# Patient Record
Sex: MALE | Race: WHITE | NOT HISPANIC OR LATINO | Employment: OTHER | URBAN - NONMETROPOLITAN AREA
[De-identification: names, ages, dates, MRNs, and addresses within clinical notes are randomized per-mention and may not be internally consistent; named-entity substitution may affect disease eponyms.]

---

## 2017-12-04 ENCOUNTER — OFFICE VISIT (OUTPATIENT)
Dept: CARDIOLOGY | Facility: CLINIC | Age: 67
End: 2017-12-04

## 2017-12-04 VITALS
DIASTOLIC BLOOD PRESSURE: 91 MMHG | HEIGHT: 69 IN | SYSTOLIC BLOOD PRESSURE: 195 MMHG | OXYGEN SATURATION: 97 % | WEIGHT: 197.4 LBS | BODY MASS INDEX: 29.24 KG/M2 | HEART RATE: 91 BPM

## 2017-12-04 DIAGNOSIS — R07.9 CHEST PAIN, UNSPECIFIED TYPE: ICD-10-CM

## 2017-12-04 DIAGNOSIS — I10 ESSENTIAL HYPERTENSION: ICD-10-CM

## 2017-12-04 DIAGNOSIS — R09.89 CAROTID BRUIT, UNSPECIFIED LATERALITY: ICD-10-CM

## 2017-12-04 DIAGNOSIS — R06.02 SHORTNESS OF BREATH: Primary | ICD-10-CM

## 2017-12-04 PROCEDURE — 99204 OFFICE O/P NEW MOD 45 MIN: CPT | Performed by: PHYSICIAN ASSISTANT

## 2017-12-04 RX ORDER — NITROGLYCERIN 0.4 MG/1
TABLET SUBLINGUAL
Qty: 100 TABLET | Refills: 11 | Status: SHIPPED | OUTPATIENT
Start: 2017-12-04 | End: 2020-12-03 | Stop reason: SDUPTHER

## 2017-12-04 RX ORDER — LISINOPRIL 20 MG/1
20 TABLET ORAL DAILY
Qty: 30 TABLET | Refills: 11 | Status: SHIPPED | OUTPATIENT
Start: 2017-12-04 | End: 2018-10-29

## 2017-12-04 RX ORDER — LEVOTHYROXINE SODIUM 0.05 MG/1
50 TABLET ORAL DAILY
Refills: 0 | COMMUNITY
Start: 2017-11-29

## 2017-12-04 RX ORDER — TERAZOSIN 5 MG/1
5 CAPSULE ORAL NIGHTLY
Refills: 0 | COMMUNITY
Start: 2017-11-29 | End: 2020-02-13

## 2017-12-04 RX ORDER — ESCITALOPRAM OXALATE 10 MG/1
10 TABLET ORAL DAILY
Refills: 0 | COMMUNITY
Start: 2017-11-29 | End: 2019-08-13

## 2017-12-04 RX ORDER — SIMVASTATIN 40 MG
TABLET ORAL DAILY
Refills: 0 | COMMUNITY
Start: 2017-11-29 | End: 2021-09-09

## 2017-12-04 NOTE — PROGRESS NOTES
"Problem list     Subjective   Ye Butler is a 67 y.o. male     Chief Complaint   Patient presents with   • Chest Pain     rare, feelslike pinched nerves    • Palpitations     patient denies palpiations but was dx with extrasystoles    • Shortness of Breath     with over exertion        HPI    Patient is a 67-year-old male that presents for initial evaluation.  Patient recently seen primary care provider performed an EKG.  EKG suggested an accelerated idioventricular rhythm with right bundle-branch block and right axis deviation.  Patient was sent to our office for further evaluation.    Patient has no history of coronary artery disease.  Patient describes on occasion he will get discomfort substernally that he describes as a \"pinching\".  He describes rubbing it and and eventually resolves.  Patient has moderate levels of shortness of breath with exertion or doing activity.  He has a denies PND orthopnea.  He doesn't palpitate.  He occasionally has orthostatic lightheadedness.  No dizziness presyncope or syncope and otherwise is doing well      Outpatient Encounter Prescriptions as of 12/4/2017   Medication Sig Dispense Refill   • escitalopram (LEXAPRO) 10 MG tablet Daily.  0   • levothyroxine (SYNTHROID, LEVOTHROID) 50 MCG tablet Daily.  0   • metFORMIN (GLUCOPHAGE) 500 MG tablet Daily.  0   • metoprolol tartrate (LOPRESSOR) 25 MG tablet 2 (Two) Times a Day.  0   • simvastatin (ZOCOR) 10 MG tablet Daily.  0   • terazosin (HYTRIN) 5 MG capsule Daily.  0   • aspirin 81 MG tablet Take 1 tablet by mouth Daily. 30 tablet 11   • lisinopril (PRINIVIL,ZESTRIL) 20 MG tablet Take 1 tablet by mouth Daily. 30 tablet 11   • nitroglycerin (NITROSTAT) 0.4 MG SL tablet 1 under the tongue as needed for angina, may repeat q5mins for up three doses 100 tablet 11     No facility-administered encounter medications on file as of 12/4/2017.        Codeine    Past Medical History:   Diagnosis Date   • COPD (chronic obstructive " pulmonary disease)    • Diabetes mellitus    • Hyperlipidemia    • Hypertension    • Kidney stone    • Stroke        Social History     Social History   • Marital status:      Spouse name: N/A   • Number of children: N/A   • Years of education: N/A     Occupational History   • Not on file.     Social History Main Topics   • Smoking status: Former Smoker   • Smokeless tobacco: Never Used      Comment: quite 20+ yrs ago    • Alcohol use No      Comment: former use 20 + yrs ago    • Drug use: No   • Sexual activity: Defer     Other Topics Concern   • Not on file     Social History Narrative   • No narrative on file       Family History   Problem Relation Age of Onset   • Cancer Mother    • Heart disease Father    • Stroke Father        Review of Systems   Constitutional: Positive for diaphoresis (nocturnal night sweats) and fatigue.   HENT: Negative.    Eyes: Negative.  Negative for visual disturbance.   Respiratory: Positive for chest tightness and shortness of breath. Negative for cough, choking, wheezing and stridor.    Cardiovascular: Positive for chest pain (rare, described as pinches nerves sensation ) and palpitations (pt denies sensation of palp or fluttering but states he was dx with extrasystoles ). Negative for leg swelling.   Gastrointestinal: Negative.  Negative for abdominal pain, blood in stool (no melena, no hematuria, no hematemesis, no hematochezia), constipation, diarrhea, nausea and vomiting.   Endocrine: Negative.  Negative for cold intolerance and heat intolerance.   Genitourinary: Negative.  Negative for difficulty urinating and frequency.   Musculoskeletal: Positive for back pain and myalgias (BL, nocturnal leg cramps).   Skin: Positive for wound (s/p pain pump implantantion LLQ ). Negative for color change, pallor and rash.   Allergic/Immunologic: Negative.    Neurological: Positive for light-headedness and numbness (BL feet ). Negative for dizziness, tremors, seizures, syncope, facial  "asymmetry, speech difficulty, weakness and headaches.   Psychiatric/Behavioral: Positive for confusion (pt states he was hunting a got lost, he states he walks those woods often, esily confused recently ).       Objective   Vitals:    12/04/17 1416   BP: (!) 195/91   BP Location: Left arm   Patient Position: Sitting   Pulse: 91   SpO2: 97%   Weight: 197 lb 6.4 oz (89.5 kg)   Height: 69\" (175.3 cm)      BP (!) 195/91 (BP Location: Left arm, Patient Position: Sitting)  Pulse 91  Ht 69\" (175.3 cm)  Wt 197 lb 6.4 oz (89.5 kg)  SpO2 97%  BMI 29.15 kg/m2    Lab Results (most recent)     None          Physical Exam   Constitutional: He is oriented to person, place, and time. He appears well-developed and well-nourished. No distress.   HENT:   Head: Normocephalic and atraumatic.   Eyes: EOM are normal. Pupils are equal, round, and reactive to light.   Neck: No JVD present. Carotid bruit is present.   Cardiovascular: Normal rate, regular rhythm, normal heart sounds and intact distal pulses.  Exam reveals no gallop and no friction rub.    No murmur heard.  Pulmonary/Chest: Effort normal and breath sounds normal. No respiratory distress. He has no wheezes. He has no rales. He exhibits no tenderness.   Abdominal: Soft.   Musculoskeletal: Normal range of motion. He exhibits no edema.   Neurological: He is alert and oriented to person, place, and time. No cranial nerve deficit.   Skin: Skin is warm and dry. No rash noted. No erythema. No pallor.   Psychiatric: He has a normal mood and affect. His behavior is normal.   Nursing note and vitals reviewed.      Procedure   Procedures       Assessment/Plan     Problems Addressed this Visit        Cardiovascular and Mediastinum    Essential hypertension    Relevant Medications    metoprolol tartrate (LOPRESSOR) 25 MG tablet    terazosin (HYTRIN) 5 MG capsule    lisinopril (PRINIVIL,ZESTRIL) 20 MG tablet    Other Relevant Orders    US Carotid Bilateral    Carotid bruit    Relevant " Orders    US Carotid Bilateral       Respiratory    Shortness of breath - Primary    Relevant Orders    US Carotid Bilateral       Nervous and Auditory    Chest pain    Relevant Orders    US Carotid Bilateral            Recommendation  1.  Because of patient's symptoms and abnormal EKG, we'll schedule for an ischemia assessment because of chest pain.  We will schedule for Lexiscan stress test.  Echocardiogram to evaluate LV function, assess filling pressures, diastolic function etc.  Next line 2.  I'm starting him on aspirin therapy to take with his current medical regimen.  Because of diabetes mellitus.  Elevated blood pressure, we'll like to start on lisinopril as well.  2.  We will see him back for follow-up of above testing.  Follow-up primary as scheduled         Electronically signed by:

## 2017-12-27 ENCOUNTER — HOSPITAL ENCOUNTER (OUTPATIENT)
Dept: CARDIOLOGY | Facility: HOSPITAL | Age: 67
Discharge: HOME | End: 2017-12-27

## 2017-12-27 ENCOUNTER — OUTSIDE FACILITY SERVICE (OUTPATIENT)
Dept: CARDIOLOGY | Facility: CLINIC | Age: 67
End: 2017-12-27

## 2017-12-27 LAB
MAXIMAL PREDICTED HEART RATE: 155 BPM
MAXIMAL PREDICTED HEART RATE: 155 BPM
STRESS TARGET HR: 132 BPM
STRESS TARGET HR: 132 BPM

## 2017-12-27 PROCEDURE — 93880 EXTRACRANIAL BILAT STUDY: CPT

## 2017-12-27 PROCEDURE — 0 TECHNETIUM SESTAMIBI: Performed by: INTERNAL MEDICINE

## 2017-12-27 PROCEDURE — A9500 TC99M SESTAMIBI: HCPCS | Performed by: INTERNAL MEDICINE

## 2017-12-27 PROCEDURE — 78452 HT MUSCLE IMAGE SPECT MULT: CPT

## 2017-12-27 PROCEDURE — 25010000002 REGADENOSON 0.4 MG/5ML SOLUTION: Performed by: INTERNAL MEDICINE

## 2017-12-27 PROCEDURE — 93306 TTE W/DOPPLER COMPLETE: CPT

## 2017-12-27 PROCEDURE — 93017 CV STRESS TEST TRACING ONLY: CPT

## 2017-12-27 RX ADMIN — TECHNETIUM TC-99M SESTAMIBI 1 DOSE: 1 INJECTION INTRAVENOUS at 11:37

## 2017-12-27 RX ADMIN — REGADENOSON 0.4 MG: 0.08 INJECTION, SOLUTION INTRAVENOUS at 11:37

## 2017-12-28 PROCEDURE — 93018 CV STRESS TEST I&R ONLY: CPT | Performed by: INTERNAL MEDICINE

## 2017-12-28 PROCEDURE — 78452 HT MUSCLE IMAGE SPECT MULT: CPT | Performed by: INTERNAL MEDICINE

## 2018-01-03 PROCEDURE — 93880 EXTRACRANIAL BILAT STUDY: CPT | Performed by: INTERNAL MEDICINE

## 2018-01-08 PROCEDURE — 93306 TTE W/DOPPLER COMPLETE: CPT | Performed by: INTERNAL MEDICINE

## 2018-01-10 ENCOUNTER — DOCUMENTATION (OUTPATIENT)
Dept: CARDIOLOGY | Facility: CLINIC | Age: 68
End: 2018-01-10

## 2018-01-10 NOTE — PROGRESS NOTES
PATIENT's WIFE AWARE OF ECHO, STRESS, AND CAROTID U/S RESULTS .  PIEDAD TRIMBLE SCHEDULING FOLLOW UP APPT. MELVI,MATTHEW

## 2018-03-13 ENCOUNTER — OFFICE VISIT (OUTPATIENT)
Dept: CARDIOLOGY | Facility: CLINIC | Age: 68
End: 2018-03-13

## 2018-03-13 ENCOUNTER — LAB (OUTPATIENT)
Dept: LAB | Facility: HOSPITAL | Age: 68
End: 2018-03-13

## 2018-03-13 VITALS
OXYGEN SATURATION: 97 % | BODY MASS INDEX: 29.38 KG/M2 | HEIGHT: 69 IN | DIASTOLIC BLOOD PRESSURE: 79 MMHG | HEART RATE: 69 BPM | WEIGHT: 198.4 LBS | SYSTOLIC BLOOD PRESSURE: 163 MMHG

## 2018-03-13 DIAGNOSIS — R07.9 CHEST PAIN, UNSPECIFIED TYPE: ICD-10-CM

## 2018-03-13 DIAGNOSIS — I44.2 ACCELERATED IDIOVENTRICULAR RHYTHM (HCC): ICD-10-CM

## 2018-03-13 DIAGNOSIS — R00.2 PALPITATIONS: ICD-10-CM

## 2018-03-13 DIAGNOSIS — R06.02 SHORTNESS OF BREATH: Primary | ICD-10-CM

## 2018-03-13 DIAGNOSIS — R06.02 SHORTNESS OF BREATH: ICD-10-CM

## 2018-03-13 PROCEDURE — 80048 BASIC METABOLIC PNL TOTAL CA: CPT | Performed by: PHYSICIAN ASSISTANT

## 2018-03-13 PROCEDURE — 99214 OFFICE O/P EST MOD 30 MIN: CPT | Performed by: PHYSICIAN ASSISTANT

## 2018-03-13 RX ORDER — CLOPIDOGREL BISULFATE 75 MG/1
75 TABLET ORAL DAILY
Qty: 30 TABLET | Refills: 11 | Status: SHIPPED | OUTPATIENT
Start: 2018-03-13 | End: 2018-04-02 | Stop reason: SINTOL

## 2018-03-13 RX ORDER — DOXYCYCLINE HYCLATE 100 MG/1
CAPSULE ORAL 2 TIMES DAILY
COMMUNITY
Start: 2018-03-12 | End: 2018-04-09

## 2018-03-13 NOTE — PROGRESS NOTES
Problem list     Subjective   Ye Butler is a 68 y.o. male     Chief Complaint   Patient presents with   • Hypertension     Here for testing f/u   • Hyperlipidemia   • COPD   • Diabetes   • Cerebrovascular Accident       HPI    Problem list  1.  Chest pain  1.1 stress test December 2017 demonstrates no evidence of ischemia and preserved LV function  1.2 chest pain on antianginal therapy  2.  Accelerated Idioventricular rhythm  2.1 EKG at primary office demonstrated idioventricular rhythm at 90 bpm  3.  Hypertension  4..  Dyslipidemia  5.  Type 2 diabetes mellitus  6.  Family history of premature coronary artery disease and a brother with sudden cardiac death    Patient is a 68-year-old male that presents for follow-up on stress test and echocardiogram.  Patient had EKG at primary office demonstrating what appears to be a possible idioventricular rhythm.  On initial evaluation, alert stress test and echocardiogram to evaluate.  LV function is normal.  Stress test is unremarkable.  Patient was placed on beta blocker therapy which has improved his symptoms however he continues to have discomfort.  Furthermore, he has left arm pain that occurs despite being on medical therapy.  Patient describes having unexplained dizziness or palpitations have seemingly improved.  He has mild levels of shortness of breath when exerting or doing activity.  No PND orthopnea.  Otherwise is doing well      Outpatient Encounter Prescriptions as of 3/13/2018   Medication Sig Dispense Refill   • aspirin 81 MG tablet Take 1 tablet by mouth Daily. 30 tablet 11   • doxycycline (VIBRAMYCIN) 100 MG capsule 2 (Two) Times a Day.     • escitalopram (LEXAPRO) 10 MG tablet Daily.  0   • levothyroxine (SYNTHROID, LEVOTHROID) 50 MCG tablet Daily.  0   • lisinopril (PRINIVIL,ZESTRIL) 20 MG tablet Take 1 tablet by mouth Daily. 30 tablet 11   • metFORMIN (GLUCOPHAGE) 500 MG tablet Daily.  0   • metoprolol tartrate (LOPRESSOR) 25 MG tablet 2 (Two)  Times a Day.  0   • simvastatin (ZOCOR) 10 MG tablet Daily.  0   • terazosin (HYTRIN) 5 MG capsule Daily.  0   • clopidogrel (PLAVIX) 75 MG tablet Take 1 tablet by mouth Daily. 30 tablet 11   • nitroglycerin (NITROSTAT) 0.4 MG SL tablet 1 under the tongue as needed for angina, may repeat q5mins for up three doses 100 tablet 11     No facility-administered encounter medications on file as of 3/13/2018.        Codeine    Past Medical History:   Diagnosis Date   • COPD (chronic obstructive pulmonary disease)    • Diabetes mellitus    • Hyperlipidemia    • Hypertension    • Kidney stone    • Stroke        Social History     Social History   • Marital status:      Spouse name: N/A   • Number of children: N/A   • Years of education: N/A     Occupational History   • Not on file.     Social History Main Topics   • Smoking status: Former Smoker   • Smokeless tobacco: Never Used      Comment: quite 20+ yrs ago    • Alcohol use No      Comment: former use 20 + yrs ago    • Drug use: No   • Sexual activity: Defer     Other Topics Concern   • Not on file     Social History Narrative   • No narrative on file       Family History   Problem Relation Age of Onset   • Cancer Mother    • Heart disease Father    • Stroke Father        Review of Systems   Constitutional: Positive for fatigue.   HENT: Positive for hearing loss, postnasal drip and sneezing.         Throat tickle   Eyes: Positive for visual disturbance (reading glasses).   Respiratory: Positive for shortness of breath.    Cardiovascular: Positive for chest pain and palpitations.   Gastrointestinal: Negative.    Endocrine: Negative.    Genitourinary:        Indwelling urinary cath. Due to prostate   Musculoskeletal: Positive for arthralgias and myalgias.   Skin: Negative.    Allergic/Immunologic: Positive for environmental allergies.   Neurological: Positive for dizziness.   Hematological: Negative.    Psychiatric/Behavioral: Positive for sleep disturbance.  "      Objective   Vitals:    03/13/18 1403   BP: 163/79   BP Location: Left arm   Patient Position: Sitting   Pulse: 69   SpO2: 97%   Weight: 90 kg (198 lb 6.4 oz)   Height: 175.3 cm (69\")      /79 (BP Location: Left arm, Patient Position: Sitting)   Pulse 69   Ht 175.3 cm (69\")   Wt 90 kg (198 lb 6.4 oz)   SpO2 97%   BMI 29.30 kg/m²     Lab Results (most recent)     None          Physical Exam   Constitutional: He is oriented to person, place, and time. He appears well-developed and well-nourished. No distress.   HENT:   Head: Normocephalic and atraumatic.   Eyes: EOM are normal. Pupils are equal, round, and reactive to light.   Neck: No JVD present.   Cardiovascular: Normal rate, regular rhythm, normal heart sounds and intact distal pulses.  Exam reveals no gallop and no friction rub.    No murmur heard.  Pulmonary/Chest: Effort normal and breath sounds normal. No respiratory distress. He has no wheezes. He has no rales. He exhibits no tenderness.   Musculoskeletal: Normal range of motion. He exhibits no edema.   Neurological: He is alert and oriented to person, place, and time. No cranial nerve deficit.   Skin: Skin is warm and dry. No rash noted. No erythema. No pallor.   Psychiatric: He has a normal mood and affect. His behavior is normal.   Nursing note and vitals reviewed.      Procedure   Procedures       Assessment/Plan     Problems Addressed this Visit        Cardiovascular and Mediastinum    Accelerated idioventricular rhythm    Relevant Medications    clopidogrel (PLAVIX) 75 MG tablet    Palpitations    Relevant Orders    Cardiac catheterization    Cardiac Event Monitor       Respiratory    Shortness of breath - Primary    Relevant Orders    Cardiac catheterization    Cardiac Event Monitor       Nervous and Auditory    Chest pain    Relevant Orders    Cardiac catheterization    Cardiac Event Monitor      Other Visit Diagnoses    None.           Recommendation  1.  Patient with an accelerated " idioventricular rhythm on initial EKG.  Patient complains of chest pain and significant arm discomfort.  He continues to have symptoms on antianginal therapy.  Risk factors for coronary artery disease are significant including family history of premature coronary artery disease and a brother with sudden cardiac death.  Other risk factors include diabetes mellitus, hypertension, dyslipidemia.  Based on patient's symptoms and arrhythmia, I feel further evaluation is warranted by cardiac catheterization.  We will place on dual antiplatelet therapy and continue all other medications.  2.  I would like to have him wear an extended event monitor to rule out any other ventricular arrhythmias such as ventricular tachycardia.  3.  Otherwise, he has nitroglycerin for chest pain.  Any chest pain or supple nitroglycerin, he will go to the ER.  Follow-up primary as scheduled              Discussed the patient's BMI with him. BMI is within normal parameters. No follow-up required.       Electronically signed by:

## 2018-03-14 LAB
ANION GAP SERPL CALCULATED.3IONS-SCNC: 9.8 MMOL/L (ref 3.6–11.2)
BUN BLD-MCNC: 11 MG/DL (ref 7–21)
BUN/CREAT SERPL: 13.6 (ref 7–25)
CALCIUM SPEC-SCNC: 10 MG/DL (ref 7.7–10)
CHLORIDE SERPL-SCNC: 104 MMOL/L (ref 99–112)
CO2 SERPL-SCNC: 25.2 MMOL/L (ref 24.3–31.9)
CREAT BLD-MCNC: 0.81 MG/DL (ref 0.43–1.29)
GFR SERPL CREATININE-BSD FRML MDRD: 95 ML/MIN/1.73
GLUCOSE BLD-MCNC: 141 MG/DL (ref 70–110)
OSMOLALITY SERPL CALC.SUM OF ELEC: 279.3 MOSM/KG (ref 273–305)
POTASSIUM BLD-SCNC: 4.5 MMOL/L (ref 3.5–5.3)
SODIUM BLD-SCNC: 139 MMOL/L (ref 135–153)

## 2018-03-19 ENCOUNTER — TELEPHONE (OUTPATIENT)
Dept: CARDIOLOGY | Facility: CLINIC | Age: 68
End: 2018-03-19

## 2018-03-19 NOTE — TELEPHONE ENCOUNTER
PER AMY SCHWARZ, PAC OK TO STAY OFF PLAVIX, BUT MUST NE ON A ASA 81 MG DAILY. WIFE STATES HE TOOK PLAVIX FOR 2-3 DAYS, BUT STARTED THROWING UP. STATES HE ALREADY TAKES A 81 MG ASA EVERY MORNING. MATTHEW OGDEN          ----- Message from Anais Aragon LPN sent at 3/19/2018 10:57 AM EDT -----   Amy started patient on Plavix, however his wife states that it made him sick to his stomach so he stopped it.

## 2018-03-29 ENCOUNTER — OUTSIDE FACILITY SERVICE (OUTPATIENT)
Dept: CARDIOLOGY | Facility: CLINIC | Age: 68
End: 2018-03-29

## 2018-03-29 PROCEDURE — 93228 REMOTE 30 DAY ECG REV/REPORT: CPT | Performed by: INTERNAL MEDICINE

## 2018-04-02 ENCOUNTER — TELEPHONE (OUTPATIENT)
Dept: CARDIOLOGY | Facility: CLINIC | Age: 68
End: 2018-04-02

## 2018-04-02 ENCOUNTER — OUTSIDE FACILITY SERVICE (OUTPATIENT)
Dept: CARDIOLOGY | Facility: CLINIC | Age: 68
End: 2018-04-02

## 2018-04-02 PROCEDURE — 92929 PR PRQ TRLUML CORONARY STENT W/ANGIO ADDL ART/BRNCH: CPT | Performed by: INTERNAL MEDICINE

## 2018-04-02 PROCEDURE — 93458 L HRT ARTERY/VENTRICLE ANGIO: CPT | Performed by: INTERNAL MEDICINE

## 2018-04-02 PROCEDURE — 92928 PRQ TCAT PLMT NTRAC ST 1 LES: CPT | Performed by: INTERNAL MEDICINE

## 2018-04-02 NOTE — TELEPHONE ENCOUNTER
Patient just received Cath and cannot tolerate Plavix. He needs Brilinta 90 mg BID sent to [facility] in [facility].

## 2018-04-05 ENCOUNTER — TELEPHONE (OUTPATIENT)
Dept: CARDIOLOGY | Facility: CLINIC | Age: 68
End: 2018-04-05

## 2018-04-05 NOTE — TELEPHONE ENCOUNTER
Patient presents today as a a walk in. Patient states he had stenting 3 days ago and onset yesterday he has been running a fever and chilling. Patient cath site seems to be healing well, no infection noted, no drainage from cath site nor is it hot to touch. He states he had a catheter inserted by Dr. Valencia about 3 weeks ago and has been having a Few problems by that. Mr. Butler also had had internal infection a few months ago. With patient having fever, pallor and chilling he was advised to proceed to urgent care or ER. He has also been around people that has had the flu.

## 2018-04-06 ENCOUNTER — TELEPHONE (OUTPATIENT)
Dept: CARDIOLOGY | Facility: CLINIC | Age: 68
End: 2018-04-06

## 2018-04-06 NOTE — TELEPHONE ENCOUNTER
MACHO FORM [facility] DEPT. CALLING STATED HAD CALLED PATIENT TO PRE-OP HIM FOR UPPER SCOPE WITH BX PER DR. PEREZ AND PATIENT TOLD HER HE JUST HAD A HEART CATH. AND HAD 4 STENTS PLACED AND SHE DIDN'T KNOW HOW TO PROCEED. I TOLD HER THAT DR. PEREZ'S OFFICE WOULD HAVE TO SEND A CARDIAC CLEARANCE REQUEST TO OUR OFFICE AND IT WOULD BE ADDRESSED WITH PROVIDER. PH,LPN

## 2018-04-09 ENCOUNTER — TELEPHONE (OUTPATIENT)
Dept: CARDIOLOGY | Facility: CLINIC | Age: 68
End: 2018-04-09

## 2018-04-09 ENCOUNTER — OFFICE VISIT (OUTPATIENT)
Dept: CARDIOLOGY | Facility: CLINIC | Age: 68
End: 2018-04-09

## 2018-04-09 VITALS
SYSTOLIC BLOOD PRESSURE: 126 MMHG | DIASTOLIC BLOOD PRESSURE: 74 MMHG | HEIGHT: 69 IN | BODY MASS INDEX: 28.94 KG/M2 | HEART RATE: 71 BPM | OXYGEN SATURATION: 96 % | WEIGHT: 195.4 LBS

## 2018-04-09 DIAGNOSIS — R06.02 SHORTNESS OF BREATH: Primary | ICD-10-CM

## 2018-04-09 DIAGNOSIS — R07.9 CHEST PAIN, UNSPECIFIED TYPE: ICD-10-CM

## 2018-04-09 DIAGNOSIS — R00.2 PALPITATIONS: ICD-10-CM

## 2018-04-09 DIAGNOSIS — I25.10 CORONARY ARTERY DISEASE INVOLVING NATIVE CORONARY ARTERY OF NATIVE HEART WITHOUT ANGINA PECTORIS: ICD-10-CM

## 2018-04-09 DIAGNOSIS — E78.00 PURE HYPERCHOLESTEROLEMIA: ICD-10-CM

## 2018-04-09 PROCEDURE — 99213 OFFICE O/P EST LOW 20 MIN: CPT | Performed by: PHYSICIAN ASSISTANT

## 2018-04-09 RX ORDER — LEVOFLOXACIN 750 MG/1
750 TABLET ORAL DAILY
Refills: 0 | COMMUNITY
Start: 2018-04-05 | End: 2018-07-10

## 2018-04-09 NOTE — PROGRESS NOTES
Problem list     Subjective   Ye Butler is a 68 y.o. male     Chief Complaint   Patient presents with   • Shortness of Breath     Patient here for cath f/u   • Hypertension   • Palpitations   • Diabetes   • Hyperlipidemia   • Cerebrovascular Accident       HPI      Problem list  1.  Coronary artery disease  1.1 cardiac catheterization March 2018 because of refractory angina despite negative stress testing demonstrated high-grade RCA disease status post stenting.  Patient underwent stenting ×2 of the circumflex with 70% distal LAD disease and medical management recommended.  2.  Accelerated Idioventricular rhythm  2.1 EKG at primary office demonstrated idioventricular rhythm at 90 bpm  3.  Hypertension  4..  Dyslipidemia  5.  Type 2 diabetes mellitus  6.  Family history of premature coronary artery disease and a brother with sudden cardiac death    Patient is a 68-year-old male that presents back for follow-up.  Patient feels well.  Interestingly, he has not had much improvement in regards to percutaneous intervention.  He occasionally will have chest discomfort but describes atypically.  He will have a sharp pain that lasts for seconds and subsides.  His shortness of breath is moderate at baseline and only minimally improved if any.  No PND orthopnea.  Occasional palpitations.  No dizziness presyncope or syncope and otherwise doing well       Outpatient Encounter Prescriptions as of 4/9/2018   Medication Sig Dispense Refill   • aspirin 81 MG tablet Take 1 tablet by mouth Daily. 30 tablet 11   • escitalopram (LEXAPRO) 10 MG tablet Daily.  0   • levoFLOXacin (LEVAQUIN) 750 MG tablet Take 750 mg by mouth Daily.  0   • levothyroxine (SYNTHROID, LEVOTHROID) 50 MCG tablet Daily.  0   • lisinopril (PRINIVIL,ZESTRIL) 20 MG tablet Take 1 tablet by mouth Daily. 30 tablet 11   • metFORMIN (GLUCOPHAGE) 500 MG tablet Daily.  0   • metoprolol tartrate (LOPRESSOR) 25 MG tablet 2 (Two) Times a Day.  0   • simvastatin (ZOCOR)  10 MG tablet Daily.  0   • terazosin (HYTRIN) 5 MG capsule Daily.  0   • ticagrelor (BRILINTA) 90 MG tablet tablet Take 1 tablet by mouth 2 (Two) Times a Day. 60 tablet 11   • nitroglycerin (NITROSTAT) 0.4 MG SL tablet 1 under the tongue as needed for angina, may repeat q5mins for up three doses 100 tablet 11   • [DISCONTINUED] doxycycline (VIBRAMYCIN) 100 MG capsule 2 (Two) Times a Day.       No facility-administered encounter medications on file as of 4/9/2018.        Claritin [loratadine] and Codeine    Past Medical History:   Diagnosis Date   • COPD (chronic obstructive pulmonary disease)    • Diabetes mellitus    • Hyperlipidemia    • Hypertension    • Kidney stone    • Stroke        Social History     Social History   • Marital status:      Spouse name: N/A   • Number of children: N/A   • Years of education: N/A     Occupational History   • Not on file.     Social History Main Topics   • Smoking status: Former Smoker   • Smokeless tobacco: Never Used      Comment: quite 20+ yrs ago    • Alcohol use No      Comment: former use 20 + yrs ago    • Drug use: No   • Sexual activity: Defer     Other Topics Concern   • Not on file     Social History Narrative   • No narrative on file       Family History   Problem Relation Age of Onset   • Cancer Mother    • Heart disease Father    • Stroke Father        Review of Systems   Constitutional: Positive for fatigue.   HENT: Negative.    Eyes: Positive for visual disturbance (reading glasses).   Respiratory: Positive for shortness of breath.    Cardiovascular: Positive for chest pain (right side a few days ago).   Gastrointestinal: Negative.    Endocrine: Negative.    Genitourinary: Positive for difficulty urinating.   Musculoskeletal: Positive for arthralgias and myalgias.   Skin: Negative.    Allergic/Immunologic: Negative.    Neurological: Dizziness: occas.   Hematological: Bruises/bleeds easily (bruise easy).   Psychiatric/Behavioral: Positive for sleep  "disturbance (off and on).       Objective   Vitals:    04/09/18 1120   BP: 126/74   BP Location: Left arm   Patient Position: Sitting   Pulse: 71   SpO2: 96%   Weight: 88.6 kg (195 lb 6.4 oz)   Height: 175.3 cm (69\")      /74 (BP Location: Left arm, Patient Position: Sitting)   Pulse 71   Ht 175.3 cm (69\")   Wt 88.6 kg (195 lb 6.4 oz)   SpO2 96%   BMI 28.86 kg/m²     Lab Results (most recent)     None          Physical Exam   Constitutional: He is oriented to person, place, and time. He appears well-developed and well-nourished. No distress.   HENT:   Head: Normocephalic and atraumatic.   Eyes: EOM are normal. Pupils are equal, round, and reactive to light.   Neck: No JVD present.   Cardiovascular: Normal rate, regular rhythm, normal heart sounds and intact distal pulses.  Exam reveals no gallop and no friction rub.    No murmur heard.  Pulmonary/Chest: Effort normal and breath sounds normal. No respiratory distress. He has no wheezes. He has no rales. He exhibits no tenderness.   Musculoskeletal: Normal range of motion. He exhibits no edema.   Neurological: He is alert and oriented to person, place, and time. No cranial nerve deficit.   Skin: Skin is warm and dry. No rash noted. No erythema. No pallor.   Psychiatric: He has a normal mood and affect. His behavior is normal.   Nursing note and vitals reviewed.      Procedure   Procedures       Assessment/Plan     Problems Addressed this Visit        Cardiovascular and Mediastinum    Coronary artery disease involving native coronary artery of native heart without angina pectoris    Relevant Orders    Comprehensive Metabolic Panel    Lipid Panel    Pure hypercholesterolemia    Relevant Orders    Comprehensive Metabolic Panel    Lipid Panel       Respiratory    Shortness of breath - Primary    Relevant Orders    Comprehensive Metabolic Panel    Lipid Panel      Other Visit Diagnoses    None.           Recommendation  1.  Patient is doing well at this time.  " Chest pain is atypical.  Shortness of breath is mild to moderate with minimal improvement.  Examination is unremarkable.  I would like to reevaluate lipid status as patient is on 10 mg of Zocor with potentially three-vessel disease.  May need to consider alternative statin therapy depending on patient's labs.  Otherwise he is doing well.  For any change or worsening discomfort, he will contact our office.  Otherwise we will see him back for follow-up in 2-3 months.  Follow-up primary as scheduled              Discussed the patient's BMI with him. BMI is within normal parameters. No follow-up required.       Electronically signed by:

## 2018-04-09 NOTE — TELEPHONE ENCOUNTER
PATIENT WAS SEEN IN OFFICE AND IS AWARE OF MONITOR RESULTS.PATIENT VERBALIZED UNDERSTANDING AND WAS ENCOURAGED TO KEEP FOLLOW UP APPOINTMENT. DANNY ATKINSON

## 2018-05-03 ENCOUNTER — APPOINTMENT (OUTPATIENT)
Dept: LAB | Facility: HOSPITAL | Age: 68
End: 2018-05-03

## 2018-05-03 LAB
ALBUMIN SERPL-MCNC: 4.6 G/DL (ref 3.4–4.8)
ALBUMIN/GLOB SERPL: 1.7 G/DL (ref 1.5–2.5)
ALP SERPL-CCNC: 79 U/L (ref 40–129)
ALT SERPL W P-5'-P-CCNC: 41 U/L (ref 10–44)
ANION GAP SERPL CALCULATED.3IONS-SCNC: 9.3 MMOL/L (ref 3.6–11.2)
AST SERPL-CCNC: 35 U/L (ref 10–34)
BILIRUB SERPL-MCNC: 0.6 MG/DL (ref 0.2–1.8)
BUN BLD-MCNC: 10 MG/DL (ref 7–21)
BUN/CREAT SERPL: 11 (ref 7–25)
CALCIUM SPEC-SCNC: 9.7 MG/DL (ref 7.7–10)
CHLORIDE SERPL-SCNC: 106 MMOL/L (ref 99–112)
CHOLEST SERPL-MCNC: 152 MG/DL (ref 0–200)
CO2 SERPL-SCNC: 24.7 MMOL/L (ref 24.3–31.9)
CREAT BLD-MCNC: 0.91 MG/DL (ref 0.43–1.29)
GFR SERPL CREATININE-BSD FRML MDRD: 83 ML/MIN/1.73
GLOBULIN UR ELPH-MCNC: 2.7 GM/DL
GLUCOSE BLD-MCNC: 132 MG/DL (ref 70–110)
HDLC SERPL-MCNC: 42 MG/DL (ref 60–100)
LDLC SERPL CALC-MCNC: 79 MG/DL (ref 0–100)
LDLC/HDLC SERPL: 1.87 {RATIO}
OSMOLALITY SERPL CALC.SUM OF ELEC: 280.3 MOSM/KG (ref 273–305)
POTASSIUM BLD-SCNC: 4.8 MMOL/L (ref 3.5–5.3)
PROT SERPL-MCNC: 7.3 G/DL (ref 6–8)
SODIUM BLD-SCNC: 140 MMOL/L (ref 135–153)
TRIGL SERPL-MCNC: 157 MG/DL (ref 0–150)
VLDLC SERPL-MCNC: 31.4 MG/DL

## 2018-05-03 PROCEDURE — 80061 LIPID PANEL: CPT | Performed by: PHYSICIAN ASSISTANT

## 2018-05-03 PROCEDURE — 80053 COMPREHEN METABOLIC PANEL: CPT | Performed by: PHYSICIAN ASSISTANT

## 2018-07-10 ENCOUNTER — OFFICE VISIT (OUTPATIENT)
Dept: CARDIOLOGY | Facility: CLINIC | Age: 68
End: 2018-07-10

## 2018-07-10 VITALS
OXYGEN SATURATION: 96 % | HEART RATE: 62 BPM | DIASTOLIC BLOOD PRESSURE: 74 MMHG | SYSTOLIC BLOOD PRESSURE: 129 MMHG | BODY MASS INDEX: 28.23 KG/M2 | WEIGHT: 190.6 LBS | HEIGHT: 69 IN

## 2018-07-10 DIAGNOSIS — R06.02 SHORTNESS OF BREATH: Primary | ICD-10-CM

## 2018-07-10 DIAGNOSIS — I10 ESSENTIAL HYPERTENSION: ICD-10-CM

## 2018-07-10 DIAGNOSIS — I25.10 CORONARY ARTERY DISEASE INVOLVING NATIVE CORONARY ARTERY OF NATIVE HEART WITHOUT ANGINA PECTORIS: ICD-10-CM

## 2018-07-10 PROCEDURE — 99213 OFFICE O/P EST LOW 20 MIN: CPT | Performed by: PHYSICIAN ASSISTANT

## 2018-07-10 RX ORDER — METOPROLOL SUCCINATE 25 MG/1
25 TABLET, EXTENDED RELEASE ORAL DAILY
Qty: 30 TABLET | Refills: 11 | Status: SHIPPED | OUTPATIENT
Start: 2018-07-10 | End: 2022-09-08 | Stop reason: ALTCHOICE

## 2018-07-10 NOTE — PROGRESS NOTES
Problem list     Subjective   Ye Butler is a 68 y.o. male     Chief Complaint   Patient presents with   • Coronary Artery Disease     Here for 3 mo. f/u   • Hypertension   • Palpitations   • Hyperlipidemia   • Diabetes   • COPD   • Cerebrovascular Accident       HPI    Problem list  1.  Coronary artery disease  1.1 cardiac catheterization March 2018 because of refractory angina despite negative stress testing demonstrated high-grade RCA disease status post stenting.  Patient underwent stenting ×2 of the circumflex with 70% distal LAD disease and medical management recommended.  2.  Accelerated Idioventricular rhythm  2.1 EKG at primary office demonstrated idioventricular rhythm at 90 bpm  3.  Hypertension  4..  Dyslipidemia  5.  Type 2 diabetes mellitus  6.  Family history of premature coronary artery disease and a brother with sudden cardiac death    Patient is a 68-year-old male that presents back for follow-up.  Patient has done remarkably well.  Patient a cardiac catheterization in March because of chest discomfort and shortness of breath despite negative stress test.  Also, there was concern about an accelerated idioventricular rhythm.  Patient underwent multivessel stenting of the RCA and circumflex.  70% distal LAD disease noted with medical management recommended.    He has felt remarkably improved.  He continues to feel better.  He has no chest discomfort.  Shortness of breath is improved and he describes to me his energy level is improved.  He does not describe any PND orthopnea.  He doesn't palpitate.  Occasionally will have orthostatic lightheadedness but no presyncope or syncope.  He is feeling much better at this time.  We will reevaluate his lipid parameters and may which demonstrated well controlled lipids.  LDL 79 total cholesterol was in normal limits at 152 and HDL was 42.      Outpatient Encounter Prescriptions as of 7/10/2018   Medication Sig Dispense Refill   • aspirin 81 MG tablet Take  1 tablet by mouth Daily. 30 tablet 11   • escitalopram (LEXAPRO) 10 MG tablet Daily.  0   • levothyroxine (SYNTHROID, LEVOTHROID) 50 MCG tablet Daily.  0   • lisinopril (PRINIVIL,ZESTRIL) 20 MG tablet Take 1 tablet by mouth Daily. 30 tablet 11   • metFORMIN (GLUCOPHAGE) 500 MG tablet Daily.  0   • simvastatin (ZOCOR) 10 MG tablet Daily.  0   • terazosin (HYTRIN) 5 MG capsule Daily.  0   • ticagrelor (BRILINTA) 90 MG tablet tablet Take 1 tablet by mouth 2 (Two) Times a Day. 60 tablet 11   • [DISCONTINUED] metoprolol tartrate (LOPRESSOR) 25 MG tablet 2 (Two) Times a Day.  0   • metoprolol succinate XL (TOPROL-XL) 25 MG 24 hr tablet Take 1 tablet by mouth Daily. 30 tablet 11   • nitroglycerin (NITROSTAT) 0.4 MG SL tablet 1 under the tongue as needed for angina, may repeat q5mins for up three doses 100 tablet 11   • [DISCONTINUED] levoFLOXacin (LEVAQUIN) 750 MG tablet Take 750 mg by mouth Daily.  0     No facility-administered encounter medications on file as of 7/10/2018.        Claritin [loratadine] and Codeine    Past Medical History:   Diagnosis Date   • COPD (chronic obstructive pulmonary disease) (CMS/HCC)    • Diabetes mellitus (CMS/HCC)    • Hyperlipidemia    • Hypertension    • Kidney stone    • Stroke (CMS/HCC)        Social History     Social History   • Marital status:      Spouse name: N/A   • Number of children: N/A   • Years of education: N/A     Occupational History   • Not on file.     Social History Main Topics   • Smoking status: Former Smoker   • Smokeless tobacco: Never Used      Comment: quite 20+ yrs ago    • Alcohol use No      Comment: former use 20 + yrs ago    • Drug use: No   • Sexual activity: Defer     Other Topics Concern   • Not on file     Social History Narrative   • No narrative on file       Family History   Problem Relation Age of Onset   • Cancer Mother    • Heart disease Father    • Stroke Father        Review of Systems   Constitutional: Negative.    HENT: Positive for ear  "pain (left) and postnasal drip.    Eyes: Positive for visual disturbance (reading glasses).   Respiratory: Positive for shortness of breath.    Cardiovascular: Negative.    Gastrointestinal: Negative.    Endocrine: Negative.    Genitourinary: Negative.    Musculoskeletal: Positive for arthralgias, back pain and myalgias.   Skin: Negative.    Allergic/Immunologic: Positive for environmental allergies.   Neurological: Positive for dizziness.   Hematological: Bruises/bleeds easily (both).   Psychiatric/Behavioral: Positive for sleep disturbance.   All other systems reviewed and are negative.      Objective   Vitals:    07/10/18 0859   BP: 129/74   BP Location: Left arm   Patient Position: Sitting   Pulse: 62   SpO2: 96%   Weight: 86.5 kg (190 lb 9.6 oz)   Height: 175.3 cm (69.02\")      /74 (BP Location: Left arm, Patient Position: Sitting)   Pulse 62   Ht 175.3 cm (69.02\")   Wt 86.5 kg (190 lb 9.6 oz)   SpO2 96%   BMI 28.13 kg/m²     Lab Results (most recent)     None          Physical Exam   Constitutional: He is oriented to person, place, and time. He appears well-developed and well-nourished. No distress.   HENT:   Head: Normocephalic and atraumatic.   Eyes: EOM are normal. Pupils are equal, round, and reactive to light.   Neck: No JVD present.   Cardiovascular: Normal rate, regular rhythm, normal heart sounds and intact distal pulses.  Exam reveals no gallop and no friction rub.    No murmur heard.  Pulmonary/Chest: Effort normal and breath sounds normal. No respiratory distress. He has no wheezes. He has no rales. He exhibits no tenderness.   Musculoskeletal: Normal range of motion. He exhibits no edema.   Neurological: He is alert and oriented to person, place, and time. No cranial nerve deficit.   Skin: Skin is warm and dry. No rash noted. No erythema. No pallor.   Psychiatric: He has a normal mood and affect. His behavior is normal.   Nursing note and vitals reviewed.      Procedure   Procedures   "     Assessment/Plan     Problems Addressed this Visit        Cardiovascular and Mediastinum    Essential hypertension    Relevant Medications    metoprolol succinate XL (TOPROL-XL) 25 MG 24 hr tablet    Coronary artery disease involving native coronary artery of native heart without angina pectoris    Relevant Medications    metoprolol succinate XL (TOPROL-XL) 25 MG 24 hr tablet       Respiratory    Shortness of breath - Primary            Recommendation    1.  Patient doing remarkably better.  Energy level is improved.  Shortness of breath has decreased.  Chest pain has completely resolved.  We'll continue risk factor modification.  I would like to decrease Toprol to once daily dosing.  We'll consider decreasing lisinopril in the future if necessary.  I'm concerned that hypotension may be contributing.  I am decreasing metoprolol in hopes that this will produce orthostatic symptoms.  2.  Otherwise we'll see him back for follow-up in 6 months.  His follow-up primary as scheduled           Patient's Body mass index is 28.13 kg/m². BMI is above normal parameters. Recommendations include: educational material.       Electronically signed by:

## 2018-07-10 NOTE — PATIENT INSTRUCTIONS
Obesity, Adult  Obesity is the condition of having too much total body fat. Being overweight or obese means that your weight is greater than what is considered healthy for your body size. Obesity is determined by a measurement called BMI. BMI is an estimate of body fat and is calculated from height and weight. For adults, a BMI of 30 or higher is considered obese.  Obesity can eventually lead to other health concerns and major illnesses, including:  · Stroke.  · Coronary artery disease (CAD).  · Type 2 diabetes.  · Some types of cancer, including cancers of the colon, breast, uterus, and gallbladder.  · Osteoarthritis.  · High blood pressure (hypertension).  · High cholesterol.  · Sleep apnea.  · Gallbladder stones.  · Infertility problems.    What are the causes?  The main cause of obesity is taking in (consuming) more calories than your body uses for energy. Other factors that contribute to this condition may include:  · Being born with genes that make you more likely to become obese.  · Having a medical condition that causes obesity. These conditions include:  ? Hypothyroidism.  ? Polycystic ovarian syndrome (PCOS).  ? Binge-eating disorder.  ? Cushing syndrome.  · Taking certain medicines, such as steroids, antidepressants, and seizure medicines.  · Not being physically active (sedentary lifestyle).  · Living where there are limited places to exercise safely or buy healthy foods.  · Not getting enough sleep.    What increases the risk?  The following factors may increase your risk of this condition:  · Having a family history of obesity.  · Being a woman of -American descent.  · Being a man of  descent.    What are the signs or symptoms?  Having excessive body fat is the main symptom of this condition.  How is this diagnosed?  This condition may be diagnosed based on:  · Your symptoms.  · Your medical history.  · A physical exam. Your health care provider may measure:  ? Your BMI. If you are an  adult with a BMI between 25 and less than 30, you are considered overweight. If you are an adult with a BMI of 30 or higher, you are considered obese.  ? The distances around your hips and your waist (circumferences). These may be compared to each other to help diagnose your condition.  ? Your skinfold thickness. Your health care provider may gently pinch a fold of your skin and measure it.    How is this treated?  Treatment for this condition often includes changing your lifestyle. Treatment may include some or all of the following:  · Dietary changes. Work with your health care provider and a dietitian to set a weight-loss goal that is healthy and reasonable for you. Dietary changes may include eating:  ? Smaller portions. A portion size is the amount of a particular food that is healthy for you to eat at one time. This varies from person to person.  ? Low-calorie or low-fat options.  ? More whole grains, fruits, and vegetables.  · Regular physical activity. This may include aerobic activity (cardio) and strength training.  · Medicine to help you lose weight. Your health care provider may prescribe medicine if you are unable to lose 1 pound a week after 6 weeks of eating more healthily and doing more physical activity.  · Surgery. Surgical options may include gastric banding and gastric bypass. Surgery may be done if:  ? Other treatments have not helped to improve your condition.  ? You have a BMI of 40 or higher.  ? You have life-threatening health problems related to obesity.    Follow these instructions at home:    Eating and drinking    · Follow recommendations from your health care provider about what you eat and drink. Your health care provider may advise you to:  ? Limit fast foods, sweets, and processed snack foods.  ? Choose low-fat options, such as low-fat milk instead of whole milk.  ? Eat 5 or more servings of fruits or vegetables every day.  ? Eat at home more often. This gives you more control over  what you eat.  ? Choose healthy foods when you eat out.  ? Learn what a healthy portion size is.  ? Keep low-fat snacks on hand.  ? Avoid sugary drinks, such as soda, fruit juice, iced tea sweetened with sugar, and flavored milk.  ? Eat a healthy breakfast.  · Drink enough water to keep your urine clear or pale yellow.  · Do not go without eating for long periods of time (do not fast) or follow a fad diet. Fasting and fad diets can be unhealthy and even dangerous.  Physical Activity  · Exercise regularly, as told by your health care provider. Ask your health care provider what types of exercise are safe for you and how often you should exercise.  · Warm up and stretch before being active.  · Cool down and stretch after being active.  · Rest between periods of activity.  Lifestyle  · Limit the time that you spend in front of your TV, computer, or video game system.  · Find ways to reward yourself that do not involve food.  · Limit alcohol intake to no more than 1 drink a day for nonpregnant women and 2 drinks a day for men. One drink equals 12 oz of beer, 5 oz of wine, or 1½ oz of hard liquor.  General instructions  · Keep a weight loss journal to keep track of the food you eat and how much you exercise you get.  · Take over-the-counter and prescription medicines only as told by your health care provider.  · Take vitamins and supplements only as told by your health care provider.  · Consider joining a support group. Your health care provider may be able to recommend a support group.  · Keep all follow-up visits as told by your health care provider. This is important.  Contact a health care provider if:  · You are unable to meet your weight loss goal after 6 weeks of dietary and lifestyle changes.  This information is not intended to replace advice given to you by your health care provider. Make sure you discuss any questions you have with your health care provider.  Document Released: 01/25/2006 Document Revised:  05/22/2017 Document Reviewed: 10/05/2016  [employer] Interactive Patient Education © 2018 [employer]  MyPlate from [employer]  The general, healthful diet is based on the 2010 Dietary Guidelines for Americans. The amount of food you need to eat from each food group depends on your age, sex, and level of physical activity and can be individualized by a dietitian. Go to ChooseMyPlate.gov for more information.  What do I need to know about the MyPlate plan?  · Enjoy your food, but eat less.  · Avoid oversized portions.  ? ½ of your plate should include fruits and vegetables.  ? ¼ of your plate should be grains.  ? ¼ of your plate should be protein.  Grains  · Make at least half of your grains whole grains.  · For a 2,000 calorie daily food plan, eat 6 oz every day.  · 1 oz is about 1 slice bread, 1 cup cereal, or ½ cup cooked rice, cereal, or pasta.  Vegetables  · Make half your plate fruits and vegetables.  · For a 2,000 calorie daily food plan, eat 2½ cups every day.  · 1 cup is about 1 cup raw or cooked vegetables or vegetable juice or 2 cups raw leafy greens.  Fruits  · Make half your plate fruits and vegetables.  · For a 2,000 calorie daily food plan, eat 2 cups every day.  · 1 cup is about 1 cup fruit or 100% fruit juice or ½ cup dried fruit.  Protein  · For a 2,000 calorie daily food plan, eat 5½ oz every day.  · 1 oz is about 1 oz meat, poultry, or fish, ¼ cup cooked beans, 1 egg, 1 Tbsp peanut butter, or ½ oz nuts or seeds.  Dairy  · Switch to fat-free or low-fat (1%) milk.  · For a 2,000 calorie daily food plan, eat 3 cups every day.  · 1 cup is about 1 cup milk or yogurt or soy milk (soy beverage), 1½ oz natural cheese, or 2 oz processed cheese.  Fats, Oils, and Empty Calories  · Only small amounts of oils are recommended.  · Empty calories are calories from solid fats or added sugars.  · Compare sodium in foods like soup, bread, and frozen meals. Choose the foods with lower numbers.  · Drink water instead of  sugary drinks.  What foods can I eat?  Grains  Whole grains such as whole wheat, quinoa, millet, and bulgur. Bread, rolls, and pasta made from whole grains. Brown or wild rice. Hot or cold cereals made from whole grains and without added sugar.  Vegetables  All fresh vegetables, especially fresh red, dark green, or orange vegetables. Peas and beans. Low-sodium frozen or canned vegetables prepared without added salt. Low-sodium vegetable juices.  Fruits  All fresh, frozen, and dried fruits. Canned fruit packed in water or fruit juice without added sugar. Fruit juices without added sugar.  Meats and Other Protein Sources  Boiled, baked, or grilled lean meat trimmed of fat. Skinless poultry. Fresh seafood and shellfish. Canned seafood packed in water. Unsalted nuts and unsalted nut butters. Tofu. Dried beans and pea. Eggs.  Dairy  Low-fat or fat-free milk, yogurt, and cheeses.  Sweets and Desserts  Frozen desserts made from low-fat milk.  Fats and Oils  Olive, peanut, and canola oils and margarine. Salad dressing and mayonnaise made from these oils.  Other  Soups and casseroles made from allowed ingredients and without added fat or salt.  The items listed above may not be a complete list of recommended foods or beverages. Contact your dietitian for more options.  What foods are not recommended?  Grains  Sweetened, low-fiber cereals. Packaged baked goods. Snack crackers and chips. Cheese crackers, butter crackers, and biscuits. Frozen waffles, sweet breads, doughnuts, pastries, packaged baking mixes, pancakes, cakes, and cookies.  Vegetables  Regular canned or frozen vegetables or vegetables prepared with salt. Canned tomatoes. Canned tomato sauce. Fried vegetables. Vegetables in cream sauce or cheese sauce.  Fruits  Fruits packed in syrup or made with added sugar.  Meats and Other Protein Sources  Marbled or fatty meats such as ribs. Poultry with skin. Fried meats, poultry, eggs, or fish. Sausages, hot dogs, and deli  meats such as pastrami, bologna, or salami.  Dairy  Whole milk, cream, cheeses made from whole milk, sour cream. Ice cream or yogurt made from whole milk or with added sugar.  Beverages  For adults, no more than one alcoholic drink per day. Regular soft drinks or other sugary beverages. Juice drinks.  Sweets and Desserts  Sugary or fatty desserts, candy, and other sweets.  Fats and Oils  Solid shortening or partially hydrogenated oils. Solid margarine. Margarine that contains trans fats. Butter.  The items listed above may not be a complete list of foods and beverages to avoid. Contact your dietitian for more information.  This information is not intended to replace advice given to you by your health care provider. Make sure you discuss any questions you have with your health care provider.  Document Released: 01/06/2009 Document Revised: 05/25/2017 Document Reviewed: 11/26/2014  [employer] Interactive Patient Education © 2018 [employer]

## 2018-07-31 ENCOUNTER — TELEPHONE (OUTPATIENT)
Dept: CARDIOLOGY | Facility: CLINIC | Age: 68
End: 2018-07-31

## 2018-09-04 ENCOUNTER — TELEPHONE (OUTPATIENT)
Dept: CARDIOLOGY | Facility: CLINIC | Age: 68
End: 2018-09-04

## 2018-09-04 NOTE — TELEPHONE ENCOUNTER
WIFE CALLED AND L/M THEY ONLY HAVE 1-1.5 BOTTLES OF BRILINTA LEFT AND THAT THEY WILL COME BY THE OFFICE ONE DAY THIS WEEK AND GET MORE SAMPLES. X6 BOTTLES PUT BACK FOR HIM. PH,LPN

## 2018-10-04 ENCOUNTER — HOSPITAL ENCOUNTER (EMERGENCY)
Facility: HOSPITAL | Age: 68
Discharge: HOME | End: 2018-10-04
Attending: EMERGENCY MEDICINE | Admitting: EMERGENCY MEDICINE

## 2018-10-04 DIAGNOSIS — R11.10 CHRONIC VOMITING: ICD-10-CM

## 2018-10-04 DIAGNOSIS — K52.9 ACUTE GASTROENTERITIS: Primary | ICD-10-CM

## 2018-10-04 LAB
A-A DO2: 20.8 MMHG (ref 0–300)
ALBUMIN SERPL-MCNC: 4.4 G/DL (ref 3.4–4.8)
ALBUMIN/GLOB SERPL: 1.7 G/DL (ref 1.5–2.5)
ALP SERPL-CCNC: 61 U/L (ref 40–129)
ALT SERPL W P-5'-P-CCNC: 15 U/L (ref 10–44)
AMYLASE SERPL-CCNC: 40 U/L (ref 28–100)
ANION GAP SERPL CALCULATED.3IONS-SCNC: 10.7 MMOL/L (ref 3.6–11.2)
ARTERIAL PATENCY WRIST A: POSITIVE
AST SERPL-CCNC: 23 U/L (ref 10–34)
ATMOSPHERIC PRESS: 729 MMHG
BACTERIA UR QL AUTO: ABNORMAL /HPF
BASE EXCESS BLDA CALC-SCNC: 0.8 MMOL/L
BASOPHILS # BLD AUTO: 0.02 10*3/MM3 (ref 0–0.3)
BASOPHILS NFR BLD AUTO: 0.3 % (ref 0–2)
BDY SITE: ABNORMAL
BILIRUB SERPL-MCNC: 0.6 MG/DL (ref 0.2–1.8)
BILIRUB UR QL STRIP: NEGATIVE
BODY TEMPERATURE: 98.6 C
BUN BLD-MCNC: 13 MG/DL (ref 7–21)
BUN/CREAT SERPL: 14.1 (ref 7–25)
CALCIUM SPEC-SCNC: 9.3 MG/DL (ref 7.7–10)
CHLORIDE SERPL-SCNC: 104 MMOL/L (ref 99–112)
CLARITY UR: CLEAR
CO2 SERPL-SCNC: 25.3 MMOL/L (ref 24.3–31.9)
COHGB MFR BLD: 0.8 % (ref 0–5)
COLOR UR: ABNORMAL
CREAT BLD-MCNC: 0.92 MG/DL (ref 0.43–1.29)
DEPRECATED RDW RBC AUTO: 40.1 FL (ref 37–54)
EOSINOPHIL # BLD AUTO: 0.02 10*3/MM3 (ref 0–0.7)
EOSINOPHIL NFR BLD AUTO: 0.3 % (ref 0–7)
ERYTHROCYTE [DISTWIDTH] IN BLOOD BY AUTOMATED COUNT: 13.5 % (ref 11.5–14.5)
GFR SERPL CREATININE-BSD FRML MDRD: 82 ML/MIN/1.73
GLOBULIN UR ELPH-MCNC: 2.6 GM/DL
GLUCOSE BLD-MCNC: 137 MG/DL (ref 70–110)
GLUCOSE UR STRIP-MCNC: NEGATIVE MG/DL
HCO3 BLDA-SCNC: 21.2 MMOL/L (ref 22–26)
HCT VFR BLD AUTO: 41.2 % (ref 42–52)
HCT VFR BLD CALC: 41 % (ref 42–52)
HGB BLD-MCNC: 14.8 G/DL (ref 14–18)
HGB BLDA-MCNC: 13.8 G/DL (ref 12–16)
HGB UR QL STRIP.AUTO: ABNORMAL
HOLD SPECIMEN: NORMAL
HOLD SPECIMEN: NORMAL
HOROWITZ INDEX BLD+IHG-RTO: 21 %
HYALINE CASTS UR QL AUTO: ABNORMAL /LPF
IMM GRANULOCYTES # BLD: 0.02 10*3/MM3 (ref 0–0.03)
IMM GRANULOCYTES NFR BLD: 0.3 % (ref 0–0.5)
KETONES UR QL STRIP: ABNORMAL
LEUKOCYTE ESTERASE UR QL STRIP.AUTO: ABNORMAL
LIPASE SERPL-CCNC: 38 U/L (ref 13–60)
LYMPHOCYTES # BLD AUTO: 1.4 10*3/MM3 (ref 1–3)
LYMPHOCYTES NFR BLD AUTO: 17.6 % (ref 16–46)
MCH RBC QN AUTO: 29.8 PG (ref 27–33)
MCHC RBC AUTO-ENTMCNC: 35.9 G/DL (ref 33–37)
MCV RBC AUTO: 83.1 FL (ref 80–94)
METHGB BLD QL: 0.4 % (ref 0–3)
MODALITY: ABNORMAL
MONOCYTES # BLD AUTO: 0.5 10*3/MM3 (ref 0.1–0.9)
MONOCYTES NFR BLD AUTO: 6.3 % (ref 0–12)
NEUTROPHILS # BLD AUTO: 5.99 10*3/MM3 (ref 1.4–6.5)
NEUTROPHILS NFR BLD AUTO: 75.2 % (ref 40–75)
NITRITE UR QL STRIP: NEGATIVE
OSMOLALITY SERPL CALC.SUM OF ELEC: 281.7 MOSM/KG (ref 273–305)
OXYHGB MFR BLDV: 96.7 % (ref 85–100)
PCO2 BLDA: 23.8 MM HG (ref 35–45)
PH BLDA: 7.57 PH UNITS (ref 7.35–7.45)
PH UR STRIP.AUTO: 5.5 [PH] (ref 5–8)
PLATELET # BLD AUTO: 203 10*3/MM3 (ref 130–400)
PMV BLD AUTO: 12.6 FL (ref 6–10)
PO2 BLDA: 93.9 MM HG (ref 80–100)
POTASSIUM BLD-SCNC: 3.4 MMOL/L (ref 3.5–5.3)
PROT SERPL-MCNC: 7 G/DL (ref 6–8)
PROT UR QL STRIP: ABNORMAL
RBC # BLD AUTO: 4.96 10*6/MM3 (ref 4.7–6.1)
RBC # UR: ABNORMAL /HPF
REF LAB TEST METHOD: ABNORMAL
SAO2 % BLDCOA: 97.9 % (ref 90–100)
SODIUM BLD-SCNC: 140 MMOL/L (ref 135–153)
SP GR UR STRIP: 1.03 (ref 1–1.03)
SQUAMOUS #/AREA URNS HPF: ABNORMAL /HPF
UROBILINOGEN UR QL STRIP: ABNORMAL
WBC NRBC COR # BLD: 7.95 10*3/MM3 (ref 4.5–12.5)
WBC UR QL AUTO: ABNORMAL /HPF
WHOLE BLOOD HOLD SPECIMEN: NORMAL
WHOLE BLOOD HOLD SPECIMEN: NORMAL

## 2018-10-04 PROCEDURE — 83050 HGB METHEMOGLOBIN QUAN: CPT | Performed by: EMERGENCY MEDICINE

## 2018-10-04 PROCEDURE — 25010000002 PROMETHAZINE PER 50 MG: Performed by: EMERGENCY MEDICINE

## 2018-10-04 PROCEDURE — 99284 EMERGENCY DEPT VISIT MOD MDM: CPT

## 2018-10-04 PROCEDURE — 80053 COMPREHEN METABOLIC PANEL: CPT | Performed by: EMERGENCY MEDICINE

## 2018-10-04 PROCEDURE — 83690 ASSAY OF LIPASE: CPT | Performed by: EMERGENCY MEDICINE

## 2018-10-04 PROCEDURE — 96374 THER/PROPH/DIAG INJ IV PUSH: CPT

## 2018-10-04 PROCEDURE — 81001 URINALYSIS AUTO W/SCOPE: CPT | Performed by: EMERGENCY MEDICINE

## 2018-10-04 PROCEDURE — 82150 ASSAY OF AMYLASE: CPT | Performed by: EMERGENCY MEDICINE

## 2018-10-04 PROCEDURE — 36600 WITHDRAWAL OF ARTERIAL BLOOD: CPT | Performed by: EMERGENCY MEDICINE

## 2018-10-04 PROCEDURE — 85025 COMPLETE CBC W/AUTO DIFF WBC: CPT | Performed by: EMERGENCY MEDICINE

## 2018-10-04 PROCEDURE — 82375 ASSAY CARBOXYHB QUANT: CPT | Performed by: EMERGENCY MEDICINE

## 2018-10-04 PROCEDURE — 82805 BLOOD GASES W/O2 SATURATION: CPT | Performed by: EMERGENCY MEDICINE

## 2018-10-04 PROCEDURE — 96361 HYDRATE IV INFUSION ADD-ON: CPT

## 2018-10-04 RX ORDER — DICYCLOMINE HCL 20 MG
20 TABLET ORAL EVERY 6 HOURS PRN
Qty: 20 TABLET | Refills: 0 | Status: SHIPPED | OUTPATIENT
Start: 2018-10-04 | End: 2018-10-29

## 2018-10-04 RX ORDER — SODIUM CHLORIDE 0.9 % (FLUSH) 0.9 %
10 SYRINGE (ML) INJECTION AS NEEDED
Status: DISCONTINUED | OUTPATIENT
Start: 2018-10-04 | End: 2018-10-05 | Stop reason: HOSPADM

## 2018-10-04 RX ORDER — ONDANSETRON 4 MG/1
4 TABLET, ORALLY DISINTEGRATING ORAL 4 TIMES DAILY
Qty: 15 TABLET | Refills: 0 | Status: SHIPPED | OUTPATIENT
Start: 2018-10-04 | End: 2022-03-08

## 2018-10-04 RX ORDER — PROMETHAZINE HYDROCHLORIDE 25 MG/1
25 SUPPOSITORY RECTAL EVERY 6 HOURS PRN
Qty: 12 SUPPOSITORY | Refills: 0 | Status: SHIPPED | OUTPATIENT
Start: 2018-10-04 | End: 2018-10-29

## 2018-10-04 RX ORDER — SODIUM CHLORIDE 9 MG/ML
125 INJECTION, SOLUTION INTRAVENOUS CONTINUOUS
Status: DISCONTINUED | OUTPATIENT
Start: 2018-10-04 | End: 2018-10-05 | Stop reason: HOSPADM

## 2018-10-04 RX ADMIN — SODIUM CHLORIDE 500 ML: 9 INJECTION, SOLUTION INTRAVENOUS at 20:07

## 2018-10-04 RX ADMIN — PROMETHAZINE HYDROCHLORIDE 12.5 MG: 25 INJECTION, SOLUTION INTRAMUSCULAR; INTRAVENOUS at 20:08

## 2018-10-04 RX ADMIN — SODIUM CHLORIDE 125 ML/HR: 9 INJECTION, SOLUTION INTRAVENOUS at 20:11

## 2018-10-04 NOTE — ED TRIAGE NOTES
Pt reassessed and advised of high volume of high acuity pt's in ER and would be called to room as soon as available, c/o abdominal pain 4/10 on scale, will continue to monitor while in lobby.

## 2018-10-04 NOTE — ED PROVIDER NOTES
Subjective   Pt comes in with abdominal pain, fever chills and NVD.  Pt has had stomach problems for months.  He has lost weight.  He discussed his issues while seeing Dr Valencia-Urologist today, advised to come to ER        History provided by:  Patient, spouse and medical records  Vomiting   The primary symptoms include fever, weight loss, fatigue, abdominal pain, nausea and vomiting. Primary symptoms do not include diarrhea, melena, hematemesis, jaundice, hematochezia, dysuria, myalgias, arthralgias or rash. The illness began 2 days ago.   The illness is also significant for chills. The illness does not include anorexia, dysphagia, odynophagia, bloating, constipation, tenesmus, back pain or itching. Associated medical issues do not include inflammatory bowel disease, GERD, gallstones, liver disease, alcohol abuse, PUD, gastric bypass, bowel resection, irritable bowel syndrome, hemorrhoids or diverticulitis.       Review of Systems   Constitutional: Positive for appetite change, chills, fatigue, fever, unexpected weight change and weight loss. Negative for activity change.   HENT: Negative.    Eyes: Negative.    Respiratory: Negative.  Negative for chest tightness, shortness of breath and wheezing.    Cardiovascular: Negative.    Gastrointestinal: Positive for abdominal pain, nausea and vomiting. Negative for abdominal distention, anorexia, bloating, constipation, diarrhea, dysphagia, hematemesis, hematochezia, jaundice and melena.   Endocrine: Negative.    Genitourinary: Negative.  Negative for difficulty urinating and dysuria.   Musculoskeletal: Negative.  Negative for arthralgias, back pain and myalgias.   Skin: Negative.  Negative for itching and rash.   Allergic/Immunologic: Negative.    Neurological: Negative.    Hematological: Negative.    All other systems reviewed and are negative.      Past Medical History:   Diagnosis Date   • COPD (chronic obstructive pulmonary disease) (CMS/[facility])    • Diabetes mellitus  (CMS/[facility])    • Hyperlipidemia    • Hypertension    • Kidney stone    • Stroke (CMS/HCC)        Allergies   Allergen Reactions   • Claritin [Loratadine] Other (See Comments)     Possible kidney issues   • Codeine        Past Surgical History:   Procedure Laterality Date   • APPENDECTOMY     • CARDIAC CATHETERIZATION     • CORONARY STENT PLACEMENT     • PAIN PUMP TRIAL         Family History   Problem Relation Age of Onset   • Cancer Mother    • Heart disease Father    • Stroke Father        Social History     Social History   • Marital status:      Social History Main Topics   • Smoking status: Former Smoker   • Smokeless tobacco: Never Used      Comment: quite 20+ yrs ago    • Alcohol use No      Comment: former use 20 + yrs ago    • Drug use: No   • Sexual activity: Defer     Other Topics Concern   • Not on file           Objective   Physical Exam   Constitutional: He is oriented to person, place, and time. He appears well-developed and well-nourished. No distress.   HENT:   Head: Normocephalic and atraumatic.   Nose: Nose normal.   Pasty mucus membranes   Eyes: Conjunctivae and EOM are normal. Right eye exhibits no discharge. Left eye exhibits no discharge. No scleral icterus.   Neck: Normal range of motion. Neck supple. No JVD present. No tracheal deviation present. No thyromegaly present.   Cardiovascular: Normal rate, regular rhythm, normal heart sounds and intact distal pulses.  Exam reveals no gallop and no friction rub.    No murmur heard.  Pulmonary/Chest: Effort normal and breath sounds normal. No stridor. No respiratory distress. He has no wheezes. He has no rales.   Abdominal: Soft. He exhibits no distension and no mass. There is no tenderness. There is no guarding.   Genitourinary:   Genitourinary Comments: No CVAT   Musculoskeletal: Normal range of motion.   Lymphadenopathy:     He has no cervical adenopathy.   Neurological: He is alert and oriented to person, place, and time. He exhibits normal  muscle tone. Coordination normal.   Skin: Skin is warm and dry. Capillary refill takes less than 2 seconds. No pallor.   Psychiatric: He has a normal mood and affect. His behavior is normal. Judgment and thought content normal.   Nursing note and vitals reviewed.      Procedures           ED Course  ED Course as of Oct 06 0434   Thu Oct 04, 2018   2146 Patient hemodynamically stable.  Nontoxic.  Nonsurgical abdominal exam.  Mild hyperventilation on his blood gas.  He does have red blood cells in his urine and some white blood cells.  There are no bacteria seen on microscopic.  It is notable that this patient saw his urologist today.  Has not have a leukocytosis or impaired renal function.  Liver enzymes and pancreatic enzymes are normal.  [TZ]      ED Course User Index  [TZ] Gaston Cho MD      No orders to display     Labs Reviewed   COMPREHENSIVE METABOLIC PANEL - Abnormal; Notable for the following:        Result Value    Glucose 137 (*)     Potassium 3.4 (*)     All other components within normal limits   URINALYSIS W/ MICROSCOPIC IF INDICATED (NO CULTURE) - Abnormal; Notable for the following:     Color, UA Dark Yellow (*)     Ketones, UA >=160 mg/dL (4+) (*)     Blood, UA Moderate (2+) (*)     Protein, UA 30 mg/dL (1+) (*)     Leuk Esterase, UA Small (1+) (*)     All other components within normal limits   CBC WITH AUTO DIFFERENTIAL - Abnormal; Notable for the following:     Hematocrit 41.2 (*)     MPV 12.6 (*)     Neutrophil % 75.2 (*)     All other components within normal limits   URINALYSIS, MICROSCOPIC ONLY - Abnormal; Notable for the following:     RBC, UA Too Numerous to Count (*)     WBC, UA 6-12 (*)     All other components within normal limits   BLOOD GAS, ARTERIAL - Abnormal; Notable for the following:     pH, Arterial 7.567 (*)     pCO2, Arterial 23.8 (*)     HCO3, Arterial 21.2 (*)     Hematocrit, Blood Gas 41.0 (*)     All other components within normal limits   LIPASE - Normal    OSMOLALITY, CALCULATED - Normal   AMYLASE - Normal   RAINBOW DRAW    Narrative:     The following orders were created for panel order [place] Draw.  Procedure                               Abnormality         Status                     ---------                               -----------         ------                     Light Blue Top[[id number]]                                   Final result               Green Top (Gel)[[id number]]                                  Final result               Lavender Top[[id number]]                                     Final result               Gold Top - SST[[id number]]                                   Final result                 Please view results for these tests on the individual orders.   CBC AND DIFFERENTIAL    Narrative:     The following orders were created for panel order CBC & Differential.  Procedure                               Abnormality         Status                     ---------                               -----------         ------                     CBC Auto Differential[[id number]]        Abnormal            Final result                 Please view results for these tests on the individual orders.   LIGHT BLUE TOP   GREEN TOP   LAVENDER TOP   GOLD TOP - SST        Medication List      START taking these medications    dicyclomine 20 MG tablet  Commonly known as:  BENTYL  Take 1 tablet by mouth Every 6 (Six) Hours As Needed (abdominal pain).     ondansetron ODT 4 MG disintegrating tablet  Commonly known as:  ZOFRAN-ODT  Take 1 tablet by mouth 4 (Four) Times a Day.     promethazine 25 MG suppository  Commonly known as:  PHENERGAN  Insert 1 suppository into the rectum Every 6 (Six) Hours As Needed for   Nausea or Vomiting.        CONTINUE taking these medications    aspirin 81 MG tablet     escitalopram 10 MG tablet  Commonly known as:  LEXAPRO     levothyroxine 50 MCG tablet  Commonly known as:  SYNTHROID, LEVOTHROID     lisinopril 20 MG tablet  Commonly known  as:  PRINIVIL,ZESTRIL  Take 1 tablet by mouth Daily.     metFORMIN 500 MG tablet  Commonly known as:  GLUCOPHAGE     metoprolol succinate XL 25 MG 24 hr tablet  Commonly known as:  TOPROL-XL  Take 1 tablet by mouth Daily.     nitroglycerin 0.4 MG SL tablet  Commonly known as:  NITROSTAT  1 under the tongue as needed for angina, may repeat q5mins for up three   doses     simvastatin 10 MG tablet  Commonly known as:  ZOCOR     terazosin 5 MG capsule  Commonly known as:  HYTRIN     ticagrelor 90 MG tablet tablet  Commonly known as:  BRILINTA  Take 1 tablet by mouth 2 (Two) Times a Day.                    MDM  Number of Diagnoses or Management Options  Acute gastroenteritis: new and requires workup  Chronic vomiting: established and worsening     Amount and/or Complexity of Data Reviewed  Clinical lab tests: ordered and reviewed  Obtain history from someone other than the patient: yes    Risk of Complications, Morbidity, and/or Mortality  Presenting problems: moderate  Diagnostic procedures: moderate  Management options: moderate    Patient Progress  Patient progress: stable        Final diagnoses:   Acute gastroenteritis   Chronic vomiting            Gaston Cho MD  10/06/18 0434

## 2018-10-05 VITALS
WEIGHT: 190 LBS | SYSTOLIC BLOOD PRESSURE: 169 MMHG | HEART RATE: 70 BPM | DIASTOLIC BLOOD PRESSURE: 80 MMHG | OXYGEN SATURATION: 99 % | HEIGHT: 69 IN | RESPIRATION RATE: 16 BRPM | TEMPERATURE: 99 F | BODY MASS INDEX: 28.14 KG/M2

## 2018-10-29 ENCOUNTER — OFFICE VISIT (OUTPATIENT)
Dept: CARDIOLOGY | Facility: CLINIC | Age: 68
End: 2018-10-29

## 2018-10-29 VITALS
HEART RATE: 70 BPM | BODY MASS INDEX: 26.72 KG/M2 | OXYGEN SATURATION: 98 % | SYSTOLIC BLOOD PRESSURE: 157 MMHG | WEIGHT: 180.4 LBS | HEIGHT: 69 IN | DIASTOLIC BLOOD PRESSURE: 88 MMHG

## 2018-10-29 DIAGNOSIS — R07.9 CHEST PAIN, UNSPECIFIED TYPE: ICD-10-CM

## 2018-10-29 DIAGNOSIS — R00.2 PALPITATIONS: ICD-10-CM

## 2018-10-29 DIAGNOSIS — R06.02 SHORTNESS OF BREATH: Primary | ICD-10-CM

## 2018-10-29 DIAGNOSIS — I25.10 CORONARY ARTERY DISEASE INVOLVING NATIVE CORONARY ARTERY OF NATIVE HEART WITHOUT ANGINA PECTORIS: ICD-10-CM

## 2018-10-29 PROCEDURE — 99214 OFFICE O/P EST MOD 30 MIN: CPT | Performed by: PHYSICIAN ASSISTANT

## 2018-10-29 RX ORDER — OMEPRAZOLE 20 MG/1
20 CAPSULE, DELAYED RELEASE ORAL DAILY
Refills: 0 | COMMUNITY
Start: 2018-10-25 | End: 2021-03-09 | Stop reason: SDUPTHER

## 2018-10-29 RX ORDER — LISINOPRIL 20 MG/1
20 TABLET ORAL 2 TIMES DAILY
Qty: 60 TABLET | Refills: 11 | Status: SHIPPED | OUTPATIENT
Start: 2018-10-29 | End: 2018-12-03 | Stop reason: SDUPTHER

## 2018-10-29 RX ORDER — AMLODIPINE BESYLATE 5 MG/1
5 TABLET ORAL DAILY
Qty: 30 TABLET | Refills: 11 | Status: SHIPPED | OUTPATIENT
Start: 2018-10-29 | End: 2019-12-09 | Stop reason: SDUPTHER

## 2018-10-29 RX ORDER — FINASTERIDE 5 MG/1
5 TABLET, FILM COATED ORAL DAILY
COMMUNITY
End: 2020-02-13 | Stop reason: ALTCHOICE

## 2018-11-29 ENCOUNTER — HOSPITAL ENCOUNTER (OUTPATIENT)
Dept: CARDIOLOGY | Facility: HOSPITAL | Age: 68
Discharge: HOME | End: 2018-11-29

## 2018-11-29 PROCEDURE — 93306 TTE W/DOPPLER COMPLETE: CPT | Performed by: INTERNAL MEDICINE

## 2018-11-29 PROCEDURE — 25010000002 REGADENOSON 0.4 MG/5ML SOLUTION: Performed by: INTERNAL MEDICINE

## 2018-11-29 PROCEDURE — 78452 HT MUSCLE IMAGE SPECT MULT: CPT | Performed by: INTERNAL MEDICINE

## 2018-11-29 PROCEDURE — 0 TECHNETIUM SESTAMIBI: Performed by: INTERNAL MEDICINE

## 2018-11-29 PROCEDURE — 93018 CV STRESS TEST I&R ONLY: CPT | Performed by: INTERNAL MEDICINE

## 2018-11-29 PROCEDURE — A9500 TC99M SESTAMIBI: HCPCS | Performed by: INTERNAL MEDICINE

## 2018-11-29 PROCEDURE — 93017 CV STRESS TEST TRACING ONLY: CPT

## 2018-11-29 PROCEDURE — 93306 TTE W/DOPPLER COMPLETE: CPT

## 2018-11-29 PROCEDURE — 78452 HT MUSCLE IMAGE SPECT MULT: CPT

## 2018-11-29 RX ADMIN — TECHNETIUM TC 99M SESTAMIBI 1 DOSE: 1 INJECTION INTRAVENOUS at 13:20

## 2018-11-29 RX ADMIN — REGADENOSON 0.4 MG: 0.08 INJECTION, SOLUTION INTRAVENOUS at 13:20

## 2018-11-29 RX ADMIN — TECHNETIUM TC 99M SESTAMIBI 1 DOSE: 1 INJECTION INTRAVENOUS at 12:05

## 2018-11-30 LAB
BH CV NUCLEAR PRIOR STUDY: 3
BH CV STRESS BP STAGE 1: NORMAL
BH CV STRESS COMMENTS STAGE 1: NORMAL
BH CV STRESS DOSE REGADENOSON STAGE 1: 0.4
BH CV STRESS DURATION MIN STAGE 1: 0
BH CV STRESS DURATION SEC STAGE 1: 10
BH CV STRESS HR STAGE 1: 93
BH CV STRESS PROTOCOL 1: NORMAL
BH CV STRESS RECOVERY BP: NORMAL MMHG
BH CV STRESS RECOVERY HR: 92 BPM
BH CV STRESS STAGE 1: 1
MAXIMAL PREDICTED HEART RATE: 153 BPM
PERCENT MAX PREDICTED HR: 69.28 %
STRESS BASELINE BP: NORMAL MMHG
STRESS BASELINE HR: 75 BPM
STRESS PERCENT HR: 82 %
STRESS POST PEAK BP: NORMAL MMHG
STRESS POST PEAK HR: 106 BPM
STRESS TARGET HR: 130 BPM

## 2018-12-02 LAB
BH CV ECHO MEAS - ACS: 2.2 CM
BH CV ECHO MEAS - AO MEAN PG: 5.8 MMHG
BH CV ECHO MEAS - AO ROOT AREA (BSA CORRECTED): 1.7
BH CV ECHO MEAS - AO ROOT AREA: 8.3 CM^2
BH CV ECHO MEAS - AO ROOT DIAM: 3.3 CM
BH CV ECHO MEAS - AO V2 MEAN: 111.6 CM/SEC
BH CV ECHO MEAS - AO V2 VTI: 30.3 CM
BH CV ECHO MEAS - BSA(HAYCOCK): 2 M^2
BH CV ECHO MEAS - BSA: 2 M^2
BH CV ECHO MEAS - BZI_BMI: 26.6 KILOGRAMS/M^2
BH CV ECHO MEAS - BZI_METRIC_HEIGHT: 175.3 CM
BH CV ECHO MEAS - BZI_METRIC_WEIGHT: 81.6 KG
BH CV ECHO MEAS - EDV(CUBED): 131.7 ML
BH CV ECHO MEAS - EDV(MOD-SP4): 103 ML
BH CV ECHO MEAS - EDV(TEICH): 123.1 ML
BH CV ECHO MEAS - EF(CUBED): 54.4 %
BH CV ECHO MEAS - EF(MOD-SP4): 62.1 %
BH CV ECHO MEAS - EF(TEICH): 46 %
BH CV ECHO MEAS - ESV(CUBED): 60 ML
BH CV ECHO MEAS - ESV(MOD-SP4): 39 ML
BH CV ECHO MEAS - ESV(TEICH): 66.5 ML
BH CV ECHO MEAS - FS: 23.1 %
BH CV ECHO MEAS - IVS/LVPW: 0.96
BH CV ECHO MEAS - IVSD: 1.3 CM
BH CV ECHO MEAS - LA DIMENSION: 4 CM
BH CV ECHO MEAS - LA/AO: 1.2
BH CV ECHO MEAS - LV DIASTOLIC VOL/BSA (35-75): 52.1 ML/M^2
BH CV ECHO MEAS - LV IVRT: 0.1 SEC
BH CV ECHO MEAS - LV MASS(C)D: 272.7 GRAMS
BH CV ECHO MEAS - LV MASS(C)DI: 138 GRAMS/M^2
BH CV ECHO MEAS - LV SYSTOLIC VOL/BSA (12-30): 19.7 ML/M^2
BH CV ECHO MEAS - LVIDD: 5.1 CM
BH CV ECHO MEAS - LVIDS: 3.9 CM
BH CV ECHO MEAS - LVLD AP4: 7.9 CM
BH CV ECHO MEAS - LVLS AP4: 6.5 CM
BH CV ECHO MEAS - LVOT AREA (M): 3.5 CM^2
BH CV ECHO MEAS - LVOT AREA: 3.6 CM^2
BH CV ECHO MEAS - LVOT DIAM: 2.1 CM
BH CV ECHO MEAS - LVPWD: 1.3 CM
BH CV ECHO MEAS - MV A MAX VEL: 67.6 CM/SEC
BH CV ECHO MEAS - MV DEC SLOPE: 181.7 CM/SEC^2
BH CV ECHO MEAS - MV E MAX VEL: 51.8 CM/SEC
BH CV ECHO MEAS - MV E/A: 0.77
BH CV ECHO MEAS - RAP SYSTOLE: 10 MMHG
BH CV ECHO MEAS - RVDD: 2.8 CM
BH CV ECHO MEAS - RVSP: 27.7 MMHG
BH CV ECHO MEAS - SI(AO): 128.2 ML/M^2
BH CV ECHO MEAS - SI(CUBED): 36.3 ML/M^2
BH CV ECHO MEAS - SI(MOD-SP4): 32.4 ML/M^2
BH CV ECHO MEAS - SI(TEICH): 28.7 ML/M^2
BH CV ECHO MEAS - SV(AO): 253.3 ML
BH CV ECHO MEAS - SV(CUBED): 71.7 ML
BH CV ECHO MEAS - SV(MOD-SP4): 64 ML
BH CV ECHO MEAS - SV(TEICH): 56.6 ML
BH CV ECHO MEAS - TR MAX VEL: 210.4 CM/SEC
MAXIMAL PREDICTED HEART RATE: 153 BPM
STRESS TARGET HR: 130 BPM

## 2018-12-03 ENCOUNTER — TELEPHONE (OUTPATIENT)
Dept: CARDIOLOGY | Facility: CLINIC | Age: 68
End: 2018-12-03

## 2018-12-03 RX ORDER — LISINOPRIL 20 MG/1
20 TABLET ORAL 2 TIMES DAILY
Qty: 60 TABLET | Refills: 11 | Status: SHIPPED | OUTPATIENT
Start: 2018-12-03 | End: 2020-12-01 | Stop reason: DRUGHIGH

## 2018-12-03 NOTE — TELEPHONE ENCOUNTER
Refill sent as requested.  Mell Herrera MA    ----- Message from Nubia Chambers sent at 12/3/2018 10:37 AM EST -----  Contact: PT  PLEASE CALL IN     [facility]                    TO        [facility] [place]              [facility]

## 2018-12-06 ENCOUNTER — TELEPHONE (OUTPATIENT)
Dept: CARDIOLOGY | Facility: CLINIC | Age: 68
End: 2018-12-06

## 2018-12-06 NOTE — TELEPHONE ENCOUNTER
Patient aware of stress test results. Patient states he is feeling well and better than usual. He has not been experiencing any shortness of breath. Patient advised to keep follow up as scheduled for 1/10/19 and to contact office for any symptoms or concerns. Mell Herrera MA        ----- Message from THONY Lind sent at 12/3/2018 10:05 AM EST -----  If patient is symptomatic, 3-4 week follow-up.  Otherwise, routine follow-up

## 2019-01-10 ENCOUNTER — OFFICE VISIT (OUTPATIENT)
Dept: CARDIOLOGY | Facility: CLINIC | Age: 69
End: 2019-01-10

## 2019-01-10 VITALS
OXYGEN SATURATION: 98 % | WEIGHT: 187.2 LBS | HEIGHT: 69 IN | HEART RATE: 69 BPM | BODY MASS INDEX: 27.73 KG/M2 | SYSTOLIC BLOOD PRESSURE: 127 MMHG | DIASTOLIC BLOOD PRESSURE: 81 MMHG

## 2019-01-10 DIAGNOSIS — I10 ESSENTIAL HYPERTENSION: ICD-10-CM

## 2019-01-10 DIAGNOSIS — R06.02 SHORTNESS OF BREATH: ICD-10-CM

## 2019-01-10 DIAGNOSIS — I25.10 CORONARY ARTERY DISEASE INVOLVING NATIVE CORONARY ARTERY OF NATIVE HEART WITHOUT ANGINA PECTORIS: Primary | ICD-10-CM

## 2019-01-10 PROCEDURE — 99213 OFFICE O/P EST LOW 20 MIN: CPT | Performed by: PHYSICIAN ASSISTANT

## 2019-01-10 RX ORDER — SUCRALFATE 1 G/1
1 TABLET ORAL 4 TIMES DAILY
COMMUNITY
End: 2019-08-13

## 2019-01-10 RX ORDER — POTASSIUM CHLORIDE 750 MG/1
10 TABLET, FILM COATED, EXTENDED RELEASE ORAL 2 TIMES DAILY
COMMUNITY
End: 2019-08-13 | Stop reason: ALTCHOICE

## 2019-01-10 NOTE — PROGRESS NOTES
Problem list     Subjective   Ye Butler is a 69 y.o. male     Chief Complaint   Patient presents with   • Follow-up     stress/echo   • Shortness of Breath       HPI      Problem list  1.  Coronary artery disease  1.1 cardiac catheterization March 2018 because of refractory angina despite negative stress testing demonstrated high-grade RCA disease status post stenting.  Patient underwent stenting ×2 of the circumflex with 70% distal LAD disease and medical management recommended.  1.2 stress test October 2018 demonstrates no evidence of ischemia and preserved LV function  2.  Accelerated Idioventricular rhythm  2.1 EKG at primary office demonstrated idioventricular rhythm at 90 bpm  3.  Hypertension  4..  Dyslipidemia  5.  Type 2 diabetes mellitus  6.  Family history of premature coronary artery disease and a brother with sudden cardiac death      Patient is a 69-year-old male that presents back to the office for follow-up.  He is here today to review his stress test and echocardiogram.  Stress test was normal with no evidence of ischemia and preserved LV function.  Echocardiogram demonstrates normal systolic function with mild diastolic dysfunction no significant stenotic or regurgitant valvular lesion, no effusion.    Patient describes recently been diagnosed with peptic ulcer disease.  He has been undergoing treatment and following with GI services.  He describes that recently    he was evaluated by primary and he was anemic.  They feel he may be losing blood from somewhere.  He is being monitored closely.    He has no chest discomfort.  He does have moderate levels of stable exertional dyspnea.  This is rather chronic issue without any change or progression.  No PND orthopnea.    He doesn't palpitate or have dysrhythmic symptoms.  He is doing well otherwise    Outpatient Encounter Medications as of 1/10/2019   Medication Sig Dispense Refill   • amLODIPine (NORVASC) 5 MG tablet Take 1 tablet by mouth  Daily. 30 tablet 11   • aspirin 81 MG tablet Take 1 tablet by mouth Daily. 30 tablet 11   • escitalopram (LEXAPRO) 10 MG tablet Take 10 mg by mouth Daily.  0   • finasteride (PROSCAR) 5 MG tablet Take 5 mg by mouth Daily.     • levothyroxine (SYNTHROID, LEVOTHROID) 50 MCG tablet Take 50 mcg by mouth Daily.  0   • lisinopril (PRINIVIL,ZESTRIL) 20 MG tablet Take 1 tablet by mouth 2 (Two) Times a Day. 60 tablet 11   • metFORMIN (GLUCOPHAGE) 500 MG tablet Take 500 mg by mouth 2 (Two) Times a Day With Meals.  0   • metoprolol succinate XL (TOPROL-XL) 25 MG 24 hr tablet Take 1 tablet by mouth Daily. 30 tablet 11   • nitroglycerin (NITROSTAT) 0.4 MG SL tablet 1 under the tongue as needed for angina, may repeat q5mins for up three doses 100 tablet 11   • omeprazole (priLOSEC) 20 MG capsule Take 20 mg by mouth Daily.  0   • ondansetron ODT (ZOFRAN-ODT) 4 MG disintegrating tablet Take 1 tablet by mouth 4 (Four) Times a Day. 15 tablet 0   • potassium chloride (K-DUR) 10 MEQ CR tablet Take 10 mEq by mouth 2 (Two) Times a Day.     • Sennosides-Docusate Sodium (SENNA PLUS PO) Take 2 capsules by mouth 2 (Two) Times a Day.     • simvastatin (ZOCOR) 10 MG tablet Take 10 mg by mouth Daily.  0   • sucralfate (CARAFATE) 1 g tablet Take 1 g by mouth 4 (Four) Times a Day.     • terazosin (HYTRIN) 5 MG capsule Take 5 mg by mouth Every Night.  0   • ticagrelor (BRILINTA) 90 MG tablet tablet Take 1 tablet by mouth 2 (Two) Times a Day. 60 tablet 11     No facility-administered encounter medications on file as of 1/10/2019.        Claritin [loratadine] and Codeine    Past Medical History:   Diagnosis Date   • COPD (chronic obstructive pulmonary disease) (CMS/HCC)    • Diabetes mellitus (CMS/HCC)    • Hyperlipidemia    • Hypertension    • Kidney stone    • Stroke (CMS/HCC)        Social History     Socioeconomic History   • Marital status:      Spouse name: Not on file   • Number of children: Not on file   • Years of education: Not on  "file   • Highest education level: Not on file   Social Needs   • Financial resource strain: Not on file   • Food insecurity - worry: Not on file   • Food insecurity - inability: Not on file   • Transportation needs - medical: Not on file   • Transportation needs - non-medical: Not on file   Occupational History   • Not on file   Tobacco Use   • Smoking status: Former Smoker   • Smokeless tobacco: Never Used   • Tobacco comment: quit 20+ yrs ago    Substance and Sexual Activity   • Alcohol use: No     Comment: former use 20 + yrs ago    • Drug use: No   • Sexual activity: Defer   Other Topics Concern   • Not on file   Social History Narrative   • Not on file       Family History   Problem Relation Age of Onset   • Cancer Mother    • Heart disease Father    • Stroke Father        Review of Systems   Constitutional: Positive for diaphoresis (night sweats ) and fatigue.   HENT: Positive for sore throat.    Eyes: Positive for visual disturbance (reading glasses).   Respiratory: Positive for shortness of breath (with activity).    Cardiovascular: Negative.  Negative for chest pain, palpitations and leg swelling.   Gastrointestinal: Positive for vomiting (daily for last week).   Endocrine: Negative.    Genitourinary: Negative.    Musculoskeletal: Positive for arthralgias and back pain.   Skin: Negative.    Allergic/Immunologic: Negative.    Neurological: Positive for dizziness (with quick movement).   Hematological: Bruises/bleeds easily.   Psychiatric/Behavioral: Positive for agitation and sleep disturbance. The patient is nervous/anxious.    All other systems reviewed and are negative.      Objective   Vitals:    01/10/19 0814   BP: 127/81   BP Location: Left arm   Patient Position: Sitting   Cuff Size: Adult   Pulse: 69   SpO2: 98%   Weight: 84.9 kg (187 lb 3.2 oz)   Height: 175.3 cm (69\")      /81 (BP Location: Left arm, Patient Position: Sitting, Cuff Size: Adult)   Pulse 69   Ht 175.3 cm (69\")   Wt 84.9 kg " (187 lb 3.2 oz)   SpO2 98%   BMI 27.64 kg/m²     Lab Results (most recent)     None          Physical Exam   Constitutional: He is oriented to person, place, and time. He appears well-developed and well-nourished. No distress.   HENT:   Head: Normocephalic and atraumatic.   Eyes: EOM are normal. Pupils are equal, round, and reactive to light.   Neck: No JVD present.   Cardiovascular: Normal rate, regular rhythm, normal heart sounds and intact distal pulses. Exam reveals no gallop and no friction rub.   No murmur heard.  Pulmonary/Chest: Effort normal and breath sounds normal. No respiratory distress. He has no wheezes. He has no rales. He exhibits no tenderness.   Musculoskeletal: Normal range of motion. He exhibits no edema.   Neurological: He is alert and oriented to person, place, and time. No cranial nerve deficit.   Skin: Skin is warm and dry. No rash noted. No erythema. No pallor.   Psychiatric: He has a normal mood and affect. His behavior is normal.       Procedure   Procedures       Assessment/Plan     Problems Addressed this Visit        Cardiovascular and Mediastinum    Essential hypertension    Coronary artery disease involving native coronary artery of native heart without angina pectoris - Primary       Respiratory    Shortness of breath             recommendation  1.  Patient doing well.  No symptoms of angina, failure, or arrhythmia.  2.  Shortness of breath is rather chronic without any progression.  Recent stress test was negative.  We will continue medical management.  3.  Patient on lower dose statin.  However, lipids are being managed by primary.  He is doing well from that standpoint.  4.  I discussed with him that after March, we could consider stopping his Brilinta.  He will follow closely with his primary regards to his anemia.  Otherwise we'll see him back for follow-up in 6 months.              Patient's Body mass index is 27.64 kg/m². BMI is within normal parameters. No follow-up  required.       Electronically signed by:

## 2019-08-13 ENCOUNTER — OFFICE VISIT (OUTPATIENT)
Dept: CARDIOLOGY | Facility: CLINIC | Age: 69
End: 2019-08-13

## 2019-08-13 VITALS
SYSTOLIC BLOOD PRESSURE: 141 MMHG | HEIGHT: 69 IN | BODY MASS INDEX: 29.33 KG/M2 | HEART RATE: 81 BPM | DIASTOLIC BLOOD PRESSURE: 84 MMHG | OXYGEN SATURATION: 95 % | WEIGHT: 198 LBS

## 2019-08-13 DIAGNOSIS — I25.10 CORONARY ARTERY DISEASE INVOLVING NATIVE CORONARY ARTERY OF NATIVE HEART WITHOUT ANGINA PECTORIS: ICD-10-CM

## 2019-08-13 DIAGNOSIS — R06.02 SHORTNESS OF BREATH: Primary | ICD-10-CM

## 2019-08-13 DIAGNOSIS — I10 ESSENTIAL HYPERTENSION: ICD-10-CM

## 2019-08-13 PROCEDURE — 99213 OFFICE O/P EST LOW 20 MIN: CPT | Performed by: PHYSICIAN ASSISTANT

## 2019-08-13 NOTE — PATIENT INSTRUCTIONS
"Fat and Cholesterol Restricted Eating Plan  Getting too much fat and cholesterol in your diet may cause health problems. Choosing the right foods helps keep your fat and cholesterol at normal levels. This can keep you from getting certain diseases.  Your doctor may recommend an eating plan that includes:  · Total fat: ______% or less of total calories a day.  · Saturated fat: ______% or less of total calories a day.  · Cholesterol: less than _________mg a day.  · Fiber: ______g a day.  What are tips for following this plan?  General tips    · Work with your doctor to lose weight if you need to.  · Avoid:  ? Foods with added sugar.  ? Fried foods.  ? Foods with partially hydrogenated oils.  · Limit alcohol intake to no more than 1 drink a day for nonpregnant women and 2 drinks a day for men. One drink equals 12 oz of beer, 5 oz of wine, or 1½ oz of hard liquor.  Reading food labels  · Check food labels for:  ? Trans fats.  ? Partially hydrogenated oils.  ? Saturated fat (g) in each serving.  ? Cholesterol (mg) in each serving.  ? Fiber (g) in each serving.  · Choose foods with healthy fats, such as:  ? Monounsaturated fats.  ? Polyunsaturated fats.  ? Omega-3 fats.  · Choose grain products that have whole grains. Look for the word \"whole\" as the first word in the ingredient list.  Cooking  · Cook foods using low-fat methods. These include baking, boiling, grilling, and broiling.  · Eat more home-cooked foods. Eat at restaurants and buffets less often.  · Avoid cooking using saturated fats, such as butter, cream, palm oil, palm kernel oil, and coconut oil.  Meal planning    · At meals, divide your plate into four equal parts:  ? Fill one-half of your plate with vegetables and green salads.  ? Fill one-fourth of your plate with whole grains.  ? Fill one-fourth of your plate with low-fat (lean) protein foods.  · Eat fish that is high in omega-3 fats at least two times a week. This includes mackerel, tuna, sardines, and " salmon.  · Eat foods that are high in fiber, such as whole grains, beans, apples, broccoli, carrots, peas, and barley.  Recommended foods  Grains  · Whole grains, such as whole wheat or whole grain breads, crackers, cereals, and pasta. Unsweetened oatmeal, bulgur, barley, quinoa, or brown rice. Corn or whole wheat flour tortillas.  Vegetables  · Fresh or frozen vegetables (raw, steamed, roasted, or grilled). Green salads.  Fruits  · All fresh, canned (in natural juice), or frozen fruits.  Meats and other protein foods  · Ground beef (85% or leaner), grass-fed beef, or beef trimmed of fat. Skinless chicken or turkey. Ground chicken or turkey. Pork trimmed of fat. All fish and seafood. Egg whites. Dried beans, peas, or lentils. Unsalted nuts or seeds. Unsalted canned beans. Nut butters without added sugar or oil.  Dairy  · Low-fat or nonfat dairy products, such as skim or 1% milk, 2% or reduced-fat cheeses, low-fat and fat-free ricotta or cottage cheese, or plain low-fat and nonfat yogurt.  Fats and oils  · Tub margarine without trans fats. Light or reduced-fat mayonnaise and salad dressings. Avocado. Olive, canola, sesame, or safflower oils.  The items listed above may not be a complete list of recommended foods or beverages. Contact your dietitian for more options.  The items listed above may not be a complete list of foods and beverages [you/your child] can eat. Contact a dietitian for more information.  Foods to avoid  Grains  · White bread. White pasta. White rice. Cornbread. Bagels, pastries, and croissants. Crackers and snack foods that contain trans fat and hydrogenated oils.  Vegetables  · Vegetables cooked in cheese, cream, or butter sauce. Fried vegetables.  Fruits  · Canned fruit in heavy syrup. Fruit in cream or butter sauce. Fried fruit.  Meats and other protein foods  · Fatty cuts of meat. Ribs, chicken wings, diamond, sausage, bologna, salami, chitterlings, fatback, hot dogs, bratwurst, and packaged  lunch meats. Liver and organ meats. Whole eggs and egg yolks. Chicken and turkey with skin. Fried meat.  Dairy  · Whole or 2% milk, cream, half-and-half, and cream cheese. Whole milk cheeses. Whole-fat or sweetened yogurt. Full-fat cheeses. Nondairy creamers and whipped toppings. Processed cheese, cheese spreads, and cheese curds.  Beverages  · Alcohol. Sugar-sweetened drinks such as sodas, lemonade, and fruit drinks.  Fats and oils  · Butter, stick margarine, lard, shortening, ghee, or diamond fat. Coconut, palm kernel, and palm oils.  Sweets and desserts  · Corn syrup, sugars, honey, and molasses. Candy. Jam and jelly. Syrup. Sweetened cereals. Cookies, pies, cakes, donuts, muffins, and ice cream.  The items listed above may not be a complete list of foods and beverages to avoid. Contact your dietitian for more information.  The items listed above may not be a complete list of foods and beverages [you/your child] should avoid. Contact a dietitian for more information.  Summary  · Choosing the right foods helps keep your fat and cholesterol at normal levels. This can keep you from getting certain diseases.  · At meals, fill one-half of your plate with vegetables and green salads.  · Eat high-fiber foods, like whole grains, beans, apples, carrots, peas, and barley.  · Limit added sugar, saturated fats, alcohol, and fried foods.  This information is not intended to replace advice given to you by your health care provider. Make sure you discuss any questions you have with your health care provider.  Document Released: 06/18/2013 Document Revised: 09/04/2018 Document Reviewed: 09/04/2018  [employer] Patient Education © 2019 [employer]  BMI for Adults    Body mass index (BMI) is a number that is calculated from a person's weight and height. BMI may help to estimate how much of a person's weight is composed of fat. BMI can help identify those who may be at higher risk for certain medical problems.  How is BMI  "used with adults?  BMI is used as a screening tool to identify possible weight problems. It is used to check whether a person is obese, overweight, healthy weight, or underweight.  How is BMI calculated?  BMI measures your weight and compares it to your height. This can be done either in English ([place] or metric measurements. Note that charts are available to help you find your BMI quickly and easily without having to do these calculations yourself.  To calculate your BMI in English ([place] measurements, your health care provider will:  1. Measure your weight in pounds (lb).  2. Multiply the number of pounds by 703.  ? For example, for a person who weighs 180 lb, multiply that number by 703, which equals 126,540.  3. Measure your height in inches (in). Then multiply that number by itself to get a measurement called \"inches squared.\"  ? For example, for a person who is 70 in tall, the \"inches squared\" measurement is 70 in x 70 in, which equals 4900 inches squared.  4. Divide the total from Step 2 (number of lb x 703) by the total from Step 3 (inches squared): 126,540 ÷ 4900 = 25.8. This is your BMI.  To calculate your BMI in metric measurements, your health care provider will:  1. Measure your weight in kilograms (kg).  2. Measure your height in meters (m). Then multiply that number by itself to get a measurement called \"meters squared.\"  ? For example, for a person who is 1.75 m tall, the \"meters squared\" measurement is 1.75 m x 1.75 m, which is equal to 3.1 meters squared.  3. Divide the number of kilograms (your weight) by the meters squared number. In this example: 70 ÷ 3.1 = 22.6. This is your BMI.  How is BMI interpreted?  To interpret your results, your health care provider will use BMI charts to identify whether you are underweight, normal weight, overweight, or obese. The following guidelines will be used:  · Underweight: BMI less than 18.5.  · Normal weight: BMI between 18.5 and 24.9.  · Overweight: BMI " between 25 and 29.9.  · Obese: BMI of 30 and above.  Please note:  · Weight includes both fat and muscle, so someone with a muscular build, such as an athlete, may have a BMI that is higher than 24.9. In cases like these, BMI is not an accurate measure of body fat.  · To determine if excess body fat is the cause of a BMI of 25 or higher, further assessments may need to be done by a health care provider.  · BMI is usually interpreted in the same way for men and women.  Why is BMI a useful tool?  BMI is useful in two ways:  · Identifying a weight problem that may be related to a medical condition, or that may increase the risk for medical problems.  · Promoting lifestyle and diet changes in order to reach a healthy weight.  Summary  · Body mass index (BMI) is a number that is calculated from a person's weight and height.  · BMI may help to estimate how much of a person's weight is composed of fat. BMI can help identify those who may be at higher risk for certain medical problems.  · BMI can be measured using English measurements or metric measurements.  · To interpret your results, your health care provider will use BMI charts to identify whether you are underweight, normal weight, overweight, or obese.  This information is not intended to replace advice given to you by your health care provider. Make sure you discuss any questions you have with your health care provider.  Document Released: 08/29/2005 Document Revised: 10/31/2018 Document Reviewed: 10/31/2018  [employer] Interactive Patient Education © 2019 [employer]

## 2019-08-13 NOTE — PROGRESS NOTES
Problem list     Subjective   Ye Butler is a 69 y.o. male     Chief Complaint   Patient presents with   • Shortness of Breath     here for 6 month f/u   • Coronary Artery Disease       HPI         Problem list  1.  Coronary artery disease  1.1 cardiac catheterization March 2018 because of refractory angina despite negative stress testing demonstrated high-grade RCA disease status post stenting.  Patient underwent stenting ×2 of the circumflex with 70% distal LAD disease and medical management recommended.  1.2 stress test October 2018 demonstrates no evidence of ischemia and preserved LV function  2.  Accelerated Idioventricular rhythm  2.1 EKG at primary office demonstrated idioventricular rhythm at 90 bpm  3.  Hypertension  4..  Dyslipidemia  5.  Type 2 diabetes mellitus  6.  Family history of premature coronary artery disease and a brother with sudden cardiac death      Patient is a 69-year-old male who presents back to the office for follow-up.  Patient has been doing remarkably well.  He has no chest pain or pressure.  He describes since having stenting procedure that he feels much improved.  His shortness of breath is mild at baseline.  No progressive dyspnea.  No decline in functional status.  No PND orthopnea.    He does not palpitated of dysrhythmic symptoms.  He does have occasional orthostatic dizziness but is doing well otherwise      Outpatient Encounter Medications as of 8/13/2019   Medication Sig Dispense Refill   • amLODIPine (NORVASC) 5 MG tablet Take 1 tablet by mouth Daily. 30 tablet 11   • aspirin 81 MG tablet Take 1 tablet by mouth Daily. 30 tablet 11   • finasteride (PROSCAR) 5 MG tablet Take 5 mg by mouth Daily.     • levothyroxine (SYNTHROID, LEVOTHROID) 50 MCG tablet Take 50 mcg by mouth Daily.  0   • lisinopril (PRINIVIL,ZESTRIL) 20 MG tablet Take 1 tablet by mouth 2 (Two) Times a Day. 60 tablet 11   • metFORMIN (GLUCOPHAGE) 500 MG tablet Take 500 mg by mouth 2 (Two) Times a Day With  Meals.  0   • metoprolol succinate XL (TOPROL-XL) 25 MG 24 hr tablet Take 1 tablet by mouth Daily. 30 tablet 11   • nitroglycerin (NITROSTAT) 0.4 MG SL tablet 1 under the tongue as needed for angina, may repeat q5mins for up three doses 100 tablet 11   • omeprazole (priLOSEC) 20 MG capsule Take 20 mg by mouth Daily.  0   • ondansetron ODT (ZOFRAN-ODT) 4 MG disintegrating tablet Take 1 tablet by mouth 4 (Four) Times a Day. 15 tablet 0   • simvastatin (ZOCOR) 10 MG tablet Take 10 mg by mouth Daily.  0   • terazosin (HYTRIN) 5 MG capsule Take 5 mg by mouth Every Night.  0   • [DISCONTINUED] escitalopram (LEXAPRO) 10 MG tablet Take 10 mg by mouth Daily.  0   • [DISCONTINUED] potassium chloride (K-DUR) 10 MEQ CR tablet Take 10 mEq by mouth 2 (Two) Times a Day.     • [DISCONTINUED] Sennosides-Docusate Sodium (SENNA PLUS PO) Take 2 capsules by mouth 2 (Two) Times a Day.     • [DISCONTINUED] sucralfate (CARAFATE) 1 g tablet Take 1 g by mouth 4 (Four) Times a Day.     • [DISCONTINUED] ticagrelor (BRILINTA) 90 MG tablet tablet Take 1 tablet by mouth 2 (Two) Times a Day. 60 tablet 11     No facility-administered encounter medications on file as of 8/13/2019.        Claritin [loratadine] and Codeine    Past Medical History:   Diagnosis Date   • COPD (chronic obstructive pulmonary disease) (CMS/[facility])    • Diabetes mellitus (CMS/[facility])    • Diverticulitis    • Hyperlipidemia    • Hypertension    • Kidney stone    • Stomach ulcer    • Stroke (CMS/[facility])        Social History     Socioeconomic History   • Marital status:      Spouse name: Not on file   • Number of children: Not on file   • Years of education: Not on file   • Highest education level: Not on file   Tobacco Use   • Smoking status: Former Smoker   • Smokeless tobacco: Never Used   • Tobacco comment: quit 20+ yrs ago    Substance and Sexual Activity   • Alcohol use: No     Comment: former use 20 + yrs ago    • Drug use: No   • Sexual activity: Defer       Family  "History   Problem Relation Age of Onset   • Cancer Mother    • Heart disease Father    • Stroke Father        Review of Systems   Constitutional: Positive for fatigue.   HENT: Positive for hearing loss.    Eyes: Negative.    Respiratory: Positive for shortness of breath (wirh daily activity).    Cardiovascular: Negative.  Negative for chest pain, palpitations and leg swelling.   Gastrointestinal: Negative.    Endocrine: Negative.    Genitourinary: Negative.    Musculoskeletal: Positive for arthralgias and back pain.   Skin: Negative.    Allergic/Immunologic: Negative.    Neurological: Positive for dizziness.   Hematological: Bruises/bleeds easily (bruise).   Psychiatric/Behavioral: Positive for agitation. The patient is nervous/anxious.    All other systems reviewed and are negative.      Objective   Vitals:    08/13/19 1405   BP: 141/84   BP Location: Left arm   Patient Position: Sitting   Pulse: 81   SpO2: 95%   Weight: 89.8 kg (198 lb)   Height: 175.3 cm (69\")      /84 (BP Location: Left arm, Patient Position: Sitting)   Pulse 81   Ht 175.3 cm (69\")   Wt 89.8 kg (198 lb)   SpO2 95%   BMI 29.24 kg/m²     Lab Results (most recent)     None          Physical Exam   Constitutional: He is oriented to person, place, and time. He appears well-developed and well-nourished. No distress.   HENT:   Head: Normocephalic and atraumatic.   Eyes: EOM are normal. Pupils are equal, round, and reactive to light.   Neck: No JVD present.   Cardiovascular: Normal rate, regular rhythm, normal heart sounds and intact distal pulses. Exam reveals no gallop and no friction rub.   No murmur heard.  Pulmonary/Chest: Effort normal and breath sounds normal. No respiratory distress. He has no wheezes. He has no rales. He exhibits no tenderness.   Musculoskeletal: Normal range of motion. He exhibits no edema.   Neurological: He is alert and oriented to person, place, and time. No cranial nerve deficit.   Skin: Skin is warm and dry. No " rash noted. No erythema. No pallor.   Psychiatric: He has a normal mood and affect. His behavior is normal.   Nursing note and vitals reviewed.      Procedure   Procedures       Assessment/Plan     Problems Addressed this Visit        Cardiovascular and Mediastinum    Essential hypertension    Coronary artery disease involving native coronary artery of native heart without angina pectoris       Respiratory    Shortness of breath - Primary           Recommendation  1.  Patient with coronary artery disease.  Patient asymptomatic of angina failure or arrhythmia.  We will continue medical management.  2.  He is on aspirin and statin therapy with lipids monitored per primary.  3.  Dyspnea is mild.  For now, we will continue to monitor.  We will see him back for follow-up in 6 months.  Will follow with primary as scheduled         Patient's Body mass index is 29.24 kg/m². BMI is above normal parameters. Recommendations include: educational material and referral to primary care.       Electronically signed by:

## 2019-12-09 RX ORDER — AMLODIPINE BESYLATE 5 MG/1
TABLET ORAL
Qty: 90 TABLET | Refills: 1 | Status: SHIPPED | OUTPATIENT
Start: 2019-12-09 | End: 2020-08-13

## 2020-02-13 ENCOUNTER — OFFICE VISIT (OUTPATIENT)
Dept: CARDIOLOGY | Facility: CLINIC | Age: 70
End: 2020-02-13

## 2020-02-13 VITALS
WEIGHT: 197 LBS | OXYGEN SATURATION: 96 % | BODY MASS INDEX: 29.18 KG/M2 | SYSTOLIC BLOOD PRESSURE: 137 MMHG | RESPIRATION RATE: 16 BRPM | HEART RATE: 82 BPM | HEIGHT: 69 IN | DIASTOLIC BLOOD PRESSURE: 85 MMHG

## 2020-02-13 DIAGNOSIS — I10 ESSENTIAL HYPERTENSION: ICD-10-CM

## 2020-02-13 DIAGNOSIS — R07.9 CHEST PAIN, UNSPECIFIED TYPE: ICD-10-CM

## 2020-02-13 DIAGNOSIS — I25.10 CORONARY ARTERY DISEASE INVOLVING NATIVE CORONARY ARTERY OF NATIVE HEART WITHOUT ANGINA PECTORIS: ICD-10-CM

## 2020-02-13 DIAGNOSIS — R06.02 SOB (SHORTNESS OF BREATH): Primary | ICD-10-CM

## 2020-02-13 PROCEDURE — 93000 ELECTROCARDIOGRAM COMPLETE: CPT | Performed by: PHYSICIAN ASSISTANT

## 2020-02-13 PROCEDURE — 99213 OFFICE O/P EST LOW 20 MIN: CPT | Performed by: PHYSICIAN ASSISTANT

## 2020-02-13 NOTE — PROGRESS NOTES
Problem list     Subjective   Ye Butler is a 69 y.o. male     Chief Complaint   Patient presents with   • Coronary Artery Disease     6 month follow up   • Hypertension        Problem list  1.  Coronary artery disease  1.1 cardiac catheterization March 2018 because of refractory angina despite negative stress testing demonstrated high-grade RCA disease status post stenting.  Patient underwent stenting ×2 of the circumflex with 70% distal LAD disease and medical management recommended.  1.2 stress test October 2018 demonstrates no evidence of ischemia and preserved LV function  2.  Accelerated Idioventricular rhythm  2.1 EKG at primary office demonstrated idioventricular rhythm at 90 bpm.  This was prior to catheterization  3.  Hypertension  4..  Dyslipidemia  5.  Type 2 diabetes mellitus  6.  Family history of premature coronary artery disease and a brother with sudden cardiac death     HPI    Patient is a 69-year-old male that presents to the office for follow-up.  We follow the patient routinely because of coronary disease.  Patient presented because of symptoms of chest discomfort.  He also had an accelerated idioventricular rhythm.  He underwent catheterization because of refractory symptoms despite negative stress testing which demonstrated high-grade RCA disease as well as high-grade circumflex.  He had 70% distal LAD disease with medical management recommended at that time.  This was in 2018.    He feels well.  Occasionally he will feel a sudden onset of a sharp parasternal chest pain that occurs at random and resolves after a few seconds.  This is chronic.  It has not changed or progressed in any way.  Dyspnea is moderate.  It is also stable.  No progressive shortness of breath.  Functional status has remained stable.  No PND or orthopnea.    He does not palpitate or have dysrhythmic symptoms.  Occasionally will experience orthostatic lightheadedness.  Patient describes that he has modified his  lifestyle and will stand up slowly.  Otherwise he is doing well with no syncopal episodes.      Current Outpatient Medications on File Prior to Visit   Medication Sig Dispense Refill   • amLODIPine (NORVASC) 5 MG tablet take 1 tablet by mouth once daily 90 tablet 1   • aspirin 81 MG tablet Take 1 tablet by mouth Daily. 30 tablet 11   • levothyroxine (SYNTHROID, LEVOTHROID) 50 MCG tablet Take 50 mcg by mouth Daily.  0   • lisinopril (PRINIVIL,ZESTRIL) 20 MG tablet Take 1 tablet by mouth 2 (Two) Times a Day. 60 tablet 11   • metFORMIN (GLUCOPHAGE) 500 MG tablet Take 500 mg by mouth 2 (Two) Times a Day With Meals.  0   • metoprolol succinate XL (TOPROL-XL) 25 MG 24 hr tablet Take 1 tablet by mouth Daily. 30 tablet 11   • nitroglycerin (NITROSTAT) 0.4 MG SL tablet 1 under the tongue as needed for angina, may repeat q5mins for up three doses 100 tablet 11   • omeprazole (priLOSEC) 20 MG capsule Take 20 mg by mouth Daily.  0   • ondansetron ODT (ZOFRAN-ODT) 4 MG disintegrating tablet Take 1 tablet by mouth 4 (Four) Times a Day. 15 tablet 0   • Sennosides-Docusate Sodium (SENNA PLUS PO) Take  by mouth 3 (Three) Times a Day.     • simvastatin (ZOCOR) 10 MG tablet Take 10 mg by mouth Daily.  0   • [DISCONTINUED] finasteride (PROSCAR) 5 MG tablet Take 5 mg by mouth Daily.     • [DISCONTINUED] terazosin (HYTRIN) 5 MG capsule Take 5 mg by mouth Every Night.  0     No current facility-administered medications on file prior to visit.        Claritin [loratadine] and Codeine    Past Medical History:   Diagnosis Date   • COPD (chronic obstructive pulmonary disease) (CMS/HCC)    • Diabetes mellitus (CMS/HCC)    • Diverticulitis    • Hyperlipidemia    • Hypertension    • Kidney stone    • Stomach ulcer    • Stroke (CMS/HCC)        Social History     Socioeconomic History   • Marital status:      Spouse name: Not on file   • Number of children: Not on file   • Years of education: Not on file   • Highest education level: Not on  "file   Tobacco Use   • Smoking status: Former Smoker   • Smokeless tobacco: Never Used   • Tobacco comment: quit 20+ yrs ago    Substance and Sexual Activity   • Alcohol use: No     Comment: former use 20 + yrs ago    • Drug use: No   • Sexual activity: Defer       Family History   Problem Relation Age of Onset   • Cancer Mother    • Heart disease Father    • Stroke Father        Review of Systems   Constitutional: Positive for fatigue (tires easily). Negative for activity change and appetite change.   HENT: Negative for facial swelling and trouble swallowing.    Eyes: Negative for visual disturbance.   Respiratory: Positive for shortness of breath (with exertion). Negative for apnea and chest tightness.    Cardiovascular: Positive for chest pain. Negative for palpitations and leg swelling.   Gastrointestinal: Negative for abdominal distention, abdominal pain, constipation and nausea.   Endocrine: Negative for cold intolerance and heat intolerance.   Genitourinary: Negative.    Musculoskeletal: Positive for arthralgias, back pain and joint swelling. Negative for gait problem and myalgias.   Skin: Negative for color change and rash.   Allergic/Immunologic: Negative for environmental allergies and food allergies.   Neurological: Positive for dizziness (with standing, quick sudden movements) and weakness (Lower extremites bilaterally, worse in LLE). Negative for syncope, light-headedness and numbness.   Hematological: Does not bruise/bleed easily.   Psychiatric/Behavioral: Positive for dysphoric mood and sleep disturbance. Negative for agitation and suicidal ideas. The patient is not nervous/anxious.    All other systems reviewed and are negative.      Objective   Vitals:    02/13/20 0941   BP: 137/85   BP Location: Left arm   Patient Position: Sitting   Pulse: 82   Resp: 16   SpO2: 96%   Weight: 89.4 kg (197 lb)   Height: 175.3 cm (69\")      /85 (BP Location: Left arm, Patient Position: Sitting)   Pulse 82   " "Resp 16   Ht 175.3 cm (69\")   Wt 89.4 kg (197 lb)   SpO2 96%   BMI 29.09 kg/m²     Lab Results (most recent)     None          Physical Exam   Constitutional: He is oriented to person, place, and time. He appears well-developed and well-nourished. No distress.   HENT:   Head: Normocephalic and atraumatic.   Eyes: Conjunctivae are normal. Right eye exhibits no discharge. Left eye exhibits no discharge. No scleral icterus.   Neck: No tracheal deviation present.   Cardiovascular: Normal rate, regular rhythm and normal heart sounds. Exam reveals no gallop and no friction rub.   No murmur heard.  Pulmonary/Chest: Effort normal and breath sounds normal. No respiratory distress. He has no wheezes. He has no rales. He exhibits no tenderness.   Musculoskeletal: Normal range of motion. He exhibits no edema.   Neurological: He is alert and oriented to person, place, and time. No cranial nerve deficit.   Skin: Skin is warm and dry. No rash noted. No erythema. No pallor.   Psychiatric: He has a normal mood and affect. His behavior is normal.   Nursing note and vitals reviewed.      Procedure     ECG 12 Lead  Date/Time: 2/13/2020 9:49 AM  Performed by: Slade Stanley PA  Authorized by: Slade Stanley PA   Comparison: compared with previous ECG from 11/29/2018  Comments: EKG demonstrates sinus rhythm at 78 bpm, possible inferior wall MI age undetermined nonspecific IVCD, with no acute ST changes               Assessment/Plan     Problems Addressed this Visit        Cardiovascular and Mediastinum    Essential hypertension    Coronary artery disease involving native coronary artery of native heart without angina pectoris       Respiratory    SOB (shortness of breath) - Primary    Relevant Orders    ECG 12 Lead       Nervous and Auditory    Chest pain          Recommendation  1.  Patient with coronary artery disease with multivessel stenting and residual 70% distal LAD disease with medical management recommended.  2.  " Patient with no ischemic symptoms.  He has atypical chest pain that is transient and has not changed or progressed in any way.  Discussion was made with the patient that his symptoms remain stable we will continue to monitor.  If symptoms were to progress or change in any way, he will call our office.  3.  Patient on proper medication occluding aspirin and statin therapy.  I would recommend high-dose statin.  On review of last lipid parameters available LDL was approximately 79 on 10 mg of simvastatin.  He is doing well on that medication and lipids are being monitored by primary.  We currently do not have any recent labs.  4.  Otherwise blood pressure is controlled.  We will continue the same and see him back for follow-up in 6 months.  He will follow with primary as scheduled           Ye Butler  reports that he has quit smoking. He has never used smokeless tobacco..      Patient's Body mass index is 29.09 kg/m². BMI is within normal parameters. No follow-up required..       Electronically signed by:

## 2020-02-13 NOTE — PATIENT INSTRUCTIONS
"Fat and Cholesterol Restricted Eating Plan  Getting too much fat and cholesterol in your diet may cause health problems. Choosing the right foods helps keep your fat and cholesterol at normal levels. This can keep you from getting certain diseases.  Your doctor may recommend an eating plan that includes:  · Total fat: ______% or less of total calories a day.  · Saturated fat: ______% or less of total calories a day.  · Cholesterol: less than _________mg a day.  · Fiber: ______g a day.  What are tips for following this plan?  Meal planning  · At meals, divide your plate into four equal parts:  ? Fill one-half of your plate with vegetables and green salads.  ? Fill one-fourth of your plate with whole grains.  ? Fill one-fourth of your plate with low-fat (lean) protein foods.  · Eat fish that is high in omega-3 fats at least two times a week. This includes mackerel, tuna, sardines, and salmon.  · Eat foods that are high in fiber, such as whole grains, beans, apples, broccoli, carrots, peas, and barley.  General tips    · Work with your doctor to lose weight if you need to.  · Avoid:  ? Foods with added sugar.  ? Fried foods.  ? Foods with partially hydrogenated oils.  · Limit alcohol intake to no more than 1 drink a day for nonpregnant women and 2 drinks a day for men. One drink equals 12 oz of beer, 5 oz of wine, or 1½ oz of hard liquor.  Reading food labels  · Check food labels for:  ? Trans fats.  ? Partially hydrogenated oils.  ? Saturated fat (g) in each serving.  ? Cholesterol (mg) in each serving.  ? Fiber (g) in each serving.  · Choose foods with healthy fats, such as:  ? Monounsaturated fats.  ? Polyunsaturated fats.  ? Omega-3 fats.  · Choose grain products that have whole grains. Look for the word \"whole\" as the first word in the ingredient list.  Cooking  · Cook foods using low-fat methods. These include baking, boiling, grilling, and broiling.  · Eat more home-cooked foods. Eat at restaurants and buffets " less often.  · Avoid cooking using saturated fats, such as butter, cream, palm oil, palm kernel oil, and coconut oil.  Recommended foods    Fruits  · All fresh, canned (in natural juice), or frozen fruits.  Vegetables  · Fresh or frozen vegetables (raw, steamed, roasted, or grilled). Green salads.  Grains  · Whole grains, such as whole wheat or whole grain breads, crackers, cereals, and pasta. Unsweetened oatmeal, bulgur, barley, quinoa, or brown rice. Corn or whole wheat flour tortillas.  Meats and other protein foods  · Ground beef (85% or leaner), grass-fed beef, or beef trimmed of fat. Skinless chicken or turkey. Ground chicken or turkey. Pork trimmed of fat. All fish and seafood. Egg whites. Dried beans, peas, or lentils. Unsalted nuts or seeds. Unsalted canned beans. Nut butters without added sugar or oil.  Dairy  · Low-fat or nonfat dairy products, such as skim or 1% milk, 2% or reduced-fat cheeses, low-fat and fat-free ricotta or cottage cheese, or plain low-fat and nonfat yogurt.  Fats and oils  · Tub margarine without trans fats. Light or reduced-fat mayonnaise and salad dressings. Avocado. Olive, canola, sesame, or safflower oils.  The items listed above may not be a complete list of foods and beverages you can eat. Contact a dietitian for more information.  Foods to avoid  Fruits  · Canned fruit in heavy syrup. Fruit in cream or butter sauce. Fried fruit.  Vegetables  · Vegetables cooked in cheese, cream, or butter sauce. Fried vegetables.  Grains  · White bread. White pasta. White rice. Cornbread. Bagels, pastries, and croissants. Crackers and snack foods that contain trans fat and hydrogenated oils.  Meats and other protein foods  · Fatty cuts of meat. Ribs, chicken wings, diamond, sausage, bologna, salami, chitterlings, fatback, hot dogs, bratwurst, and packaged lunch meats. Liver and organ meats. Whole eggs and egg yolks. Chicken and turkey with skin. Fried meat.  Dairy  · Whole or 2% milk, cream,  half-and-half, and cream cheese. Whole milk cheeses. Whole-fat or sweetened yogurt. Full-fat cheeses. Nondairy creamers and whipped toppings. Processed cheese, cheese spreads, and cheese curds.  Beverages  · Alcohol. Sugar-sweetened drinks such as sodas, lemonade, and fruit drinks.  Fats and oils  · Butter, stick margarine, lard, shortening, ghee, or diamond fat. Coconut, palm kernel, and palm oils.  Sweets and desserts  · Corn syrup, sugars, honey, and molasses. Candy. Jam and jelly. Syrup. Sweetened cereals. Cookies, pies, cakes, donuts, muffins, and ice cream.  The items listed above may not be a complete list of foods and beverages you should avoid. Contact a dietitian for more information.  Summary  · Choosing the right foods helps keep your fat and cholesterol at normal levels. This can keep you from getting certain diseases.  · At meals, fill one-half of your plate with vegetables and green salads.  · Eat high-fiber foods, like whole grains, beans, apples, carrots, peas, and barley.  · Limit added sugar, saturated fats, alcohol, and fried foods.  This information is not intended to replace advice given to you by your health care provider. Make sure you discuss any questions you have with your health care provider.  Document Released: 06/18/2013 Document Revised: 08/21/2019 Document Reviewed: 09/04/2018  [employer] Interactive Patient Education © 2019 [employer]  BMI for Adults    Body mass index (BMI) is a number that is calculated from a person's weight and height. BMI may help to estimate how much of a person's weight is composed of fat. BMI can help identify those who may be at higher risk for certain medical problems.  How is BMI used with adults?  BMI is used as a screening tool to identify possible weight problems. It is used to check whether a person is obese, overweight, healthy weight, or underweight.  How is BMI calculated?  BMI measures your weight and compares it to your height. This can be done  "either in English ([place] or metric measurements. Note that charts are available to help you find your BMI quickly and easily without having to do these calculations yourself.  To calculate your BMI in English ([place] measurements, your health care provider will:  1. Measure your weight in pounds (lb).  2. Multiply the number of pounds by 703.  ? For example, for a person who weighs 180 lb, multiply that number by 703, which equals 126,540.  3. Measure your height in inches (in). Then multiply that number by itself to get a measurement called \"inches squared.\"  ? For example, for a person who is 70 in tall, the \"inches squared\" measurement is 70 in x 70 in, which equals 4900 inches squared.  4. Divide the total from Step 2 (number of lb x 703) by the total from Step 3 (inches squared): 126,540 ÷ 4900 = 25.8. This is your BMI.  To calculate your BMI in metric measurements, your health care provider will:  1. Measure your weight in kilograms (kg).  2. Measure your height in meters (m). Then multiply that number by itself to get a measurement called \"meters squared.\"  ? For example, for a person who is 1.75 m tall, the \"meters squared\" measurement is 1.75 m x 1.75 m, which is equal to 3.1 meters squared.  3. Divide the number of kilograms (your weight) by the meters squared number. In this example: 70 ÷ 3.1 = 22.6. This is your BMI.  How is BMI interpreted?  To interpret your results, your health care provider will use BMI charts to identify whether you are underweight, normal weight, overweight, or obese. The following guidelines will be used:  · Underweight: BMI less than 18.5.  · Normal weight: BMI between 18.5 and 24.9.  · Overweight: BMI between 25 and 29.9.  · Obese: BMI of 30 and above.  Please note:  · Weight includes both fat and muscle, so someone with a muscular build, such as an athlete, may have a BMI that is higher than 24.9. In cases like these, BMI is not an accurate measure of body fat.  · To determine " if excess body fat is the cause of a BMI of 25 or higher, further assessments may need to be done by a health care provider.  · BMI is usually interpreted in the same way for men and women.  Why is BMI a useful tool?  BMI is useful in two ways:  · Identifying a weight problem that may be related to a medical condition, or that may increase the risk for medical problems.  · Promoting lifestyle and diet changes in order to reach a healthy weight.  Summary  · Body mass index (BMI) is a number that is calculated from a person's weight and height.  · BMI may help to estimate how much of a person's weight is composed of fat. BMI can help identify those who may be at higher risk for certain medical problems.  · BMI can be measured using English measurements or metric measurements.  · To interpret your results, your health care provider will use BMI charts to identify whether you are underweight, normal weight, overweight, or obese.  This information is not intended to replace advice given to you by your health care provider. Make sure you discuss any questions you have with your health care provider.  Document Released: 08/29/2005 Document Revised: 10/31/2018 Document Reviewed: 10/31/2018  [employer] Interactive Patient Education © 2019 [employer]

## 2020-08-13 ENCOUNTER — OFFICE VISIT (OUTPATIENT)
Dept: CARDIOLOGY | Facility: CLINIC | Age: 70
End: 2020-08-13

## 2020-08-13 VITALS
WEIGHT: 204.6 LBS | TEMPERATURE: 97.1 F | HEIGHT: 69 IN | HEART RATE: 84 BPM | OXYGEN SATURATION: 95 % | SYSTOLIC BLOOD PRESSURE: 150 MMHG | BODY MASS INDEX: 30.3 KG/M2 | DIASTOLIC BLOOD PRESSURE: 86 MMHG

## 2020-08-13 DIAGNOSIS — I10 ESSENTIAL HYPERTENSION: ICD-10-CM

## 2020-08-13 DIAGNOSIS — I95.1 ORTHOSTATIC HYPOTENSION: ICD-10-CM

## 2020-08-13 DIAGNOSIS — R07.9 CHEST PAIN, UNSPECIFIED TYPE: ICD-10-CM

## 2020-08-13 DIAGNOSIS — R06.02 SOB (SHORTNESS OF BREATH): ICD-10-CM

## 2020-08-13 DIAGNOSIS — I25.10 CORONARY ARTERY DISEASE INVOLVING NATIVE CORONARY ARTERY OF NATIVE HEART WITHOUT ANGINA PECTORIS: Primary | ICD-10-CM

## 2020-08-13 PROCEDURE — 99214 OFFICE O/P EST MOD 30 MIN: CPT | Performed by: PHYSICIAN ASSISTANT

## 2020-08-13 NOTE — PATIENT INSTRUCTIONS
"Fat and Cholesterol Restricted Eating Plan  Getting too much fat and cholesterol in your diet may cause health problems. Choosing the right foods helps keep your fat and cholesterol at normal levels. This can keep you from getting certain diseases.  Your doctor may recommend an eating plan that includes:  · Total fat: ______% or less of total calories a day.  · Saturated fat: ______% or less of total calories a day.  · Cholesterol: less than _________mg a day.  · Fiber: ______g a day.  What are tips for following this plan?  Meal planning  · At meals, divide your plate into four equal parts:  ? Fill one-half of your plate with vegetables and green salads.  ? Fill one-fourth of your plate with whole grains.  ? Fill one-fourth of your plate with low-fat (lean) protein foods.  · Eat fish that is high in omega-3 fats at least two times a week. This includes mackerel, tuna, sardines, and salmon.  · Eat foods that are high in fiber, such as whole grains, beans, apples, broccoli, carrots, peas, and barley.  General tips    · Work with your doctor to lose weight if you need to.  · Avoid:  ? Foods with added sugar.  ? Fried foods.  ? Foods with partially hydrogenated oils.  · Limit alcohol intake to no more than 1 drink a day for nonpregnant women and 2 drinks a day for men. One drink equals 12 oz of beer, 5 oz of wine, or 1½ oz of hard liquor.  Reading food labels  · Check food labels for:  ? Trans fats.  ? Partially hydrogenated oils.  ? Saturated fat (g) in each serving.  ? Cholesterol (mg) in each serving.  ? Fiber (g) in each serving.  · Choose foods with healthy fats, such as:  ? Monounsaturated fats.  ? Polyunsaturated fats.  ? Omega-3 fats.  · Choose grain products that have whole grains. Look for the word \"whole\" as the first word in the ingredient list.  Cooking  · Cook foods using low-fat methods. These include baking, boiling, grilling, and broiling.  · Eat more home-cooked foods. Eat at restaurants and buffets " less often.  · Avoid cooking using saturated fats, such as butter, cream, palm oil, palm kernel oil, and coconut oil.  Recommended foods    Fruits  · All fresh, canned (in natural juice), or frozen fruits.  Vegetables  · Fresh or frozen vegetables (raw, steamed, roasted, or grilled). Green salads.  Grains  · Whole grains, such as whole wheat or whole grain breads, crackers, cereals, and pasta. Unsweetened oatmeal, bulgur, barley, quinoa, or brown rice. Corn or whole wheat flour tortillas.  Meats and other protein foods  · Ground beef (85% or leaner), grass-fed beef, or beef trimmed of fat. Skinless chicken or turkey. Ground chicken or turkey. Pork trimmed of fat. All fish and seafood. Egg whites. Dried beans, peas, or lentils. Unsalted nuts or seeds. Unsalted canned beans. Nut butters without added sugar or oil.  Dairy  · Low-fat or nonfat dairy products, such as skim or 1% milk, 2% or reduced-fat cheeses, low-fat and fat-free ricotta or cottage cheese, or plain low-fat and nonfat yogurt.  Fats and oils  · Tub margarine without trans fats. Light or reduced-fat mayonnaise and salad dressings. Avocado. Olive, canola, sesame, or safflower oils.  The items listed above may not be a complete list of foods and beverages you can eat. Contact a dietitian for more information.  Foods to avoid  Fruits  · Canned fruit in heavy syrup. Fruit in cream or butter sauce. Fried fruit.  Vegetables  · Vegetables cooked in cheese, cream, or butter sauce. Fried vegetables.  Grains  · White bread. White pasta. White rice. Cornbread. Bagels, pastries, and croissants. Crackers and snack foods that contain trans fat and hydrogenated oils.  Meats and other protein foods  · Fatty cuts of meat. Ribs, chicken wings, diamond, sausage, bologna, salami, chitterlings, fatback, hot dogs, bratwurst, and packaged lunch meats. Liver and organ meats. Whole eggs and egg yolks. Chicken and turkey with skin. Fried meat.  Dairy  · Whole or 2% milk, cream,  half-and-half, and cream cheese. Whole milk cheeses. Whole-fat or sweetened yogurt. Full-fat cheeses. Nondairy creamers and whipped toppings. Processed cheese, cheese spreads, and cheese curds.  Beverages  · Alcohol. Sugar-sweetened drinks such as sodas, lemonade, and fruit drinks.  Fats and oils  · Butter, stick margarine, lard, shortening, ghee, or diamond fat. Coconut, palm kernel, and palm oils.  Sweets and desserts  · Corn syrup, sugars, honey, and molasses. Candy. Jam and jelly. Syrup. Sweetened cereals. Cookies, pies, cakes, donuts, muffins, and ice cream.  The items listed above may not be a complete list of foods and beverages you should avoid. Contact a dietitian for more information.  Summary  · Choosing the right foods helps keep your fat and cholesterol at normal levels. This can keep you from getting certain diseases.  · At meals, fill one-half of your plate with vegetables and green salads.  · Eat high-fiber foods, like whole grains, beans, apples, carrots, peas, and barley.  · Limit added sugar, saturated fats, alcohol, and fried foods.  This information is not intended to replace advice given to you by your health care provider. Make sure you discuss any questions you have with your health care provider.  Document Released: 06/18/2013 Document Revised: 08/21/2019 Document Reviewed: 09/04/2018  [employer] Patient Education © 2020 [employer]  How to Quarantine at Home  Information for Patients and Families    These instructions are for people with confirmed or suspected COVID-19 who do not need to be hospitalized and those with confirmed COVID-19 who were hospitalized and discharged to care for themselves at home.    If you were tested through the Health Department  The Health Department will monitor your wellbeing.  If it is determined that you do not need to be hospitalized and can be isolated at home, you will be monitored by staff from your local or state health department.     If you were tested  through a Commercial [facility]  You will need to monitor yourself and report changes in your symptoms to your doctor.  See the section below called Monitor Your Symptoms.    Follow these steps until a healthcare provider or local or state health department says you can return to your normal activities.    Stay home except to get medical care  • Restrict activities outside your home, except for getting medical care.   • Do not go to work, school, or public areas.   • Avoid using public transportation, ride-sharing, or taxis.    Separate yourself from other people and animals in your home  People  As much as possible, you should stay in a specific room and away from other people in your home. Also, you should use a separate bathroom, if available.    Animals  You should restrict contact with pets and other animals while you are sick with COVID-19, just like you would around other people. When possible, have another member of your household care for your animals while you are sick. If you are sick with COVID-19, avoid contact with your pet, including petting, snuggling, being kissed or licked, and sharing food. If you must care for your pet or be around animals while you are sick, wash your hands before and after you interact with pets and wear a facemask. See COVID-19 and Animals for more information.    Call ahead before visiting your doctor  If you have a medical appointment, call the healthcare provider and tell them that you have or may have COVID-19. This information will help the healthcare provider’s office take steps to keep other people from getting infected or exposed.    Wear a facemask  You should wear a facemask when you are around other people (e.g., sharing a room or vehicle) or pets and before you enter a healthcare provider’s office.     If you are not able to wear a facemask (for example, because it causes trouble breathing), then people who live with you should not stay in the same room with you, or they  should wear a facemask if they enter your room.    Cover your coughs and sneezes  • Cover your mouth and nose with a tissue when you cough or sneeze.   • Throw used tissues in a lined trash can.   • Immediately wash your hands with soap and water for at least 20 seconds or, if soap and water are not available, clean your hands with an alcohol-based hand  that contains at least 60% alcohol.    Clean your hands often  • Wash your hands often with soap and water for at least 20 seconds, especially after blowing your nose, coughing, or sneezing; going to the bathroom; and before eating or preparing food.     • If soap and water are not readily available, use an alcohol-based hand  with at least 60% alcohol, covering all surfaces of your hands and rubbing them together until they feel dry.    • Soap and water are the best option if hands are visibly dirty. Avoid touching your eyes, nose, and mouth with unwashed hands.    Avoid sharing personal household items  • You should not share dishes, drinking glasses, cups, eating utensils, towels, or bedding with other people or pets in your home.   • After using these items, they should be washed thoroughly with soap and water.    Clean all “high-touch” surfaces everyday  • High touch surfaces include counters, tabletops, doorknobs, bathroom fixtures, toilets, phones, keyboards, tablets, and bedside tables.   • Also, clean any surfaces that may have blood, stool, or body fluids on them.   • Use a household cleaning spray or wipe, according to the label instructions. Labels contain instructions for safe and effective use of the cleaning product, including precautions you should take when applying the product, such as wearing gloves and making sure you have good ventilation during use of the product.    Monitor your symptoms  • Seek prompt medical attention if your illness is worsening (e.g., difficulty breathing).   • Before seeking care, call your healthcare  provider and tell them that you have, or are being evaluated for, COVID-19.   • Put on a facemask before you enter the facility.     • These steps will help the healthcare provider’s office to keep other people in the office or waiting room from getting infected or exposed.   • Persons who are placed under active monitoring or facilitated self-monitoring should follow instructions provided by their local health department or occupational health professionals, as appropriate.  • If you have a medical emergency and need to call 911, notify the dispatch personnel that you have, or are being evaluated for COVID-19. If possible, put on a facemask before emergency medical services arrive.    Discontinuing home isolation  Patients with confirmed COVID-19 should remain under home isolation precautions until the risk of secondary transmission to others is thought to be low. The decision to discontinue home isolation precautions should be made on a case-by-case basis, in consultation with healthcare providers and state and local health departments.    The below content are for household members, intimate partners, and caregivers of a patient with symptomatic laboratory-confirmed COVID-19 or a patient under investigation:    Household members, intimate partners, and caregivers may have close contact with a person with symptomatic, laboratory-confirmed COVID-19 or a person under investigation.     Close contacts should monitor their health; they should call their healthcare provider right away if they develop symptoms suggestive of COVID-19 (e.g., fever, cough, shortness of breath)     Close contacts should also follow these recommendations:  • Make sure that you understand and can help the patient follow their healthcare provider’s instructions for medication(s) and care. You should help the patient with basic needs in the home and provide support for getting groceries, prescriptions, and other personal needs.  • Monitor the  patient’s symptoms. If the patient is getting sicker, call his or her healthcare provider and tell them that the patient has laboratory-confirmed COVID-19. This will help the healthcare provider’s office take steps to keep other people in the office or waiting room from getting infected. Ask the healthcare provider to call the local or state health department for additional guidance. If the patient has a medical emergency and you need to call 911, notify the dispatch personnel that the patient has, or is being evaluated for COVID-19.  • Household members should stay in another room or be  from the patient as much as possible. Household members should use a separate bedroom and bathroom, if available.  • Prohibit visitors who do not have an essential need to be in the home.  • Household members should care for any pets in the home. Do not handle pets or other animals while sick.  For more information, see COVID-19 and Animals.  • Make sure that shared spaces in the home have good air flow, such as by an air conditioner or an opened window, weather permitting.  • Perform hand hygiene frequently. Wash your hands often with soap and water for at least 20 seconds or use an alcohol-based hand  that contains 60 to 95% alcohol, covering all surfaces of your hands and rubbing them together until they feel dry. Soap and water should be used preferentially if hands are visibly dirty.  • Avoid touching your eyes, nose, and mouth with unwashed hands.  • The patient should wear a facemask when you are around other people. If the patient is not able to wear a facemask (for example, because it causes trouble breathing), you, as the caregiver, should wear a mask when you are in the same room as the patient.  • Wear a disposable facemask and gloves when you touch or have contact with the patient’s blood, stool, or body fluids, such as saliva, sputum, nasal mucus, vomit, or urine.   o Throw out disposable facemasks  and gloves after using them. Do not reuse.  o When removing personal protective equipment, first remove and dispose of gloves. Then, immediately clean your hands with soap and water or alcohol-based hand . Next, remove and dispose of facemask, and immediately clean your hands again with soap and water or alcohol-based hand .  • Avoid sharing household items with the patient. You should not share dishes, drinking glasses, cups, eating utensils, towels, bedding, or other items. After the patient uses these items, you should wash them thoroughly (see below “Wash laundry thoroughly”).  • Clean all “high-touch” surfaces, such as counters, tabletops, doorknobs, bathroom fixtures, toilets, phones, keyboards, tablets, and bedside tables, every day. Also, clean any surfaces that may have blood, stool, or body fluids on them.   o Use a household cleaning spray or wipe, according to the label instructions. Labels contain instructions for safe and effective use of the cleaning product including precautions you should take when applying the product, such as wearing gloves and making sure you have good ventilation during use of the product.  • Wash laundry thoroughly.   o Immediately remove and wash clothes or bedding that have blood, stool, or body fluids on them.  o Wear disposable gloves while handling soiled items and keep soiled items away from your body. Clean your hands (with soap and water or an alcohol-based hand ) immediately after removing your gloves.  o Read and follow directions on labels of laundry or clothing items and detergent. In general, using a normal laundry detergent according to washing machine instructions and dry thoroughly using the warmest temperatures recommended on the clothing label.  • Place all used disposable gloves, facemasks, and other contaminated items in a lined container before disposing of them with other household waste. Clean your hands (with soap and water or  "an alcohol-based hand ) immediately after handling these items. Soap and water should be used preferentially if hands are visibly dirty.  • Discuss any additional questions with your state or local health department or healthcare provider.    Adapted from information provided by the Centers for Disease Control and Prevention.  For more information, visit https://www.cdc.gov/coronavirus/2019-ncov/hcp/guidance-prevent-spread.htmlBMI for Adults    Body mass index (BMI) is a number that is calculated from a person's weight and height. BMI may help to estimate how much of a person's weight is composed of fat. BMI can help identify those who may be at higher risk for certain medical problems.  How is BMI used with adults?  BMI is used as a screening tool to identify possible weight problems. It is used to check whether a person is obese, overweight, healthy weight, or underweight.  How is BMI calculated?  BMI measures your weight and compares it to your height. This can be done either in English ([place] or metric measurements. Note that charts are available to help you find your BMI quickly and easily without having to do these calculations yourself.  To calculate your BMI in English ([place] measurements, your health care provider will:  1. Measure your weight in pounds (lb).  2. Multiply the number of pounds by 703.  ? For example, for a person who weighs 180 lb, multiply that number by 703, which equals 126,540.  3. Measure your height in inches (in). Then multiply that number by itself to get a measurement called \"inches squared.\"  ? For example, for a person who is 70 in tall, the \"inches squared\" measurement is 70 in x 70 in, which equals 4900 inches squared.  4. Divide the total from Step 2 (number of lb x 703) by the total from Step 3 (inches squared): 126,540 ÷ 4900 = 25.8. This is your BMI.  To calculate your BMI in metric measurements, your health care provider will:  1. Measure your weight in kilograms " "(kg).  2. Measure your height in meters (m). Then multiply that number by itself to get a measurement called \"meters squared.\"  ? For example, for a person who is 1.75 m tall, the \"meters squared\" measurement is 1.75 m x 1.75 m, which is equal to 3.1 meters squared.  3. Divide the number of kilograms (your weight) by the meters squared number. In this example: 70 ÷ 3.1 = 22.6. This is your BMI.  How is BMI interpreted?  To interpret your results, your health care provider will use BMI charts to identify whether you are underweight, normal weight, overweight, or obese. The following guidelines will be used:  · Underweight: BMI less than 18.5.  · Normal weight: BMI between 18.5 and 24.9.  · Overweight: BMI between 25 and 29.9.  · Obese: BMI of 30 and above.  Please note:  · Weight includes both fat and muscle, so someone with a muscular build, such as an athlete, may have a BMI that is higher than 24.9. In cases like these, BMI is not an accurate measure of body fat.  · To determine if excess body fat is the cause of a BMI of 25 or higher, further assessments may need to be done by a health care provider.  · BMI is usually interpreted in the same way for men and women.  Why is BMI a useful tool?  BMI is useful in two ways:  · Identifying a weight problem that may be related to a medical condition, or that may increase the risk for medical problems.  · Promoting lifestyle and diet changes in order to reach a healthy weight.  Summary  · Body mass index (BMI) is a number that is calculated from a person's weight and height.  · BMI may help to estimate how much of a person's weight is composed of fat. BMI can help identify those who may be at higher risk for certain medical problems.  · BMI can be measured using English measurements or metric measurements.  · To interpret your results, your health care provider will use BMI charts to identify whether you are underweight, normal weight, overweight, or obese.  This " information is not intended to replace advice given to you by your health care provider. Make sure you discuss any questions you have with your health care provider.  Document Released: 08/29/2005 Document Revised: 11/30/2018 Document Reviewed: 10/31/2018  [employer] Patient Education © 2020 [employer]

## 2020-08-13 NOTE — PROGRESS NOTES
"Problem list     Subjective   Ye Butler is a 70 y.o. male     Chief Complaint   Patient presents with   • Follow-up      Problem list  1.  Coronary artery disease  1.1 cardiac catheterization March 2018 because of refractory angina despite negative stress testing demonstrated high-grade RCA disease status post stenting.  Patient underwent stenting ×2 of the circumflex with 70% distal LAD disease and medical management recommended.  1.2 stress test October 2018 demonstrates no evidence of ischemia and preserved LV function  2.  Accelerated Idioventricular rhythm  2.1 EKG at primary office demonstrated idioventricular rhythm at 90 bpm.  This was prior to catheterization  3.  Hypertension  4..  Dyslipidemia  5.  Type 2 diabetes mellitus  6.  Family history of premature coronary artery disease and a brother with sudden cardiac death    HPI    Patient is a 70-year-old male that presents back to the office for routine follow-up.  Patient has history of coronary disease.  Patient has history of multivessel stenting as above with residual 70% distal LAD disease in 2018.  He had follow-up stress test in October demonstrating no evidence of ischemia.  Patient had an accelerated idioventricular rhythm prior to catheterization.    Patient has had discomfort.  He describes at times feeling a slight discomfort.  This occurs at rest and will occur at random.  He does not describe radiation, nausea or diaphoresis.  Patient does have shortness of breath.  It is quite significant was told that he may have emphysema.  However, he is mild to moderately short of breath even doing activities on a flat surface.  He does not describe PND orthopnea.    He does not describe palpitations but on rare occasion.  He will feel a \"flutter\".  He does not correlate dizziness presyncope or syncope with the symptoms.  However, he does have symptoms of dizziness when changing positions.  When standing, he even feels presyncopal at times.  " Otherwise is doing well      Current Outpatient Medications on File Prior to Visit   Medication Sig Dispense Refill   • aspirin 81 MG tablet Take 1 tablet by mouth Daily. 30 tablet 11   • levothyroxine (SYNTHROID, LEVOTHROID) 50 MCG tablet Take 50 mcg by mouth Daily.  0   • lisinopril (PRINIVIL,ZESTRIL) 20 MG tablet Take 1 tablet by mouth 2 (Two) Times a Day. 60 tablet 11   • metFORMIN (GLUCOPHAGE) 500 MG tablet Take 500 mg by mouth 2 (Two) Times a Day With Meals.  0   • metoprolol succinate XL (TOPROL-XL) 25 MG 24 hr tablet Take 1 tablet by mouth Daily. 30 tablet 11   • nitroglycerin (NITROSTAT) 0.4 MG SL tablet 1 under the tongue as needed for angina, may repeat q5mins for up three doses 100 tablet 11   • omeprazole (priLOSEC) 20 MG capsule Take 20 mg by mouth Daily.  0   • ondansetron ODT (ZOFRAN-ODT) 4 MG disintegrating tablet Take 1 tablet by mouth 4 (Four) Times a Day. 15 tablet 0   • Sennosides-Docusate Sodium (SENNA PLUS PO) Take  by mouth 3 (Three) Times a Day.     • simvastatin (ZOCOR) 10 MG tablet Take 10 mg by mouth Daily.  0   • [DISCONTINUED] amLODIPine (NORVASC) 5 MG tablet take 1 tablet by mouth once daily 90 tablet 1     No current facility-administered medications on file prior to visit.        Claritin [loratadine] and Codeine    Past Medical History:   Diagnosis Date   • COPD (chronic obstructive pulmonary disease) (CMS/HCC)    • Diabetes mellitus (CMS/HCC)    • Diverticulitis    • Emphysema lung (CMS/HCC)    • Hyperlipidemia    • Hypertension    • Kidney stone    • Stomach ulcer    • Stroke (CMS/HCC)        Social History     Socioeconomic History   • Marital status:      Spouse name: Not on file   • Number of children: Not on file   • Years of education: Not on file   • Highest education level: Not on file   Tobacco Use   • Smoking status: Former Smoker   • Smokeless tobacco: Never Used   • Tobacco comment: quit 20+ yrs ago    Substance and Sexual Activity   • Alcohol use: No      "Comment: former use 20 + yrs ago    • Drug use: No   • Sexual activity: Defer       Family History   Problem Relation Age of Onset   • Cancer Mother    • Heart disease Father    • Stroke Father        Review of Systems   Constitutional: Positive for fatigue.   HENT: Positive for hearing loss. Negative for congestion, rhinorrhea and sore throat.    Respiratory: Positive for shortness of breath (\"always\" ). Negative for chest tightness.    Cardiovascular: Positive for chest pain (Denies CP). Negative for palpitations and leg swelling.   Gastrointestinal: Positive for abdominal pain (ulcers). Negative for blood in stool, constipation, diarrhea, nausea and vomiting.   Endocrine: Positive for cold intolerance. Negative for heat intolerance.   Genitourinary: Negative for difficulty urinating, dysuria, frequency, hematuria and urgency.   Musculoskeletal: Positive for arthralgias and back pain.   Skin: Negative for rash and wound.   Allergic/Immunologic: Negative for environmental allergies and food allergies.   Neurological: Positive for dizziness (sometimes when lying down and when he gets SoA ). Negative for syncope, weakness, light-headedness, numbness and headaches.   Hematological: Bruises/bleeds easily (bruises).   Psychiatric/Behavioral: Positive for sleep disturbance (States he can't sleep more than 15 min at a time, states he was told he would be SoA at HS due to emphysema).   All other systems reviewed and are negative.      Objective   Vitals:    08/13/20 0927   BP: 150/86   Pulse: 84   Temp: 97.1 °F (36.2 °C)   SpO2: 95%   Weight: 92.8 kg (204 lb 9.6 oz)   Height: 175.3 cm (69\")      /86   Pulse 84   Temp 97.1 °F (36.2 °C)   Ht 175.3 cm (69\")   Wt 92.8 kg (204 lb 9.6 oz)   SpO2 95%   BMI 30.21 kg/m²     Lab Results (most recent)     None          Physical Exam   Constitutional: He is oriented to person, place, and time. He appears well-developed and well-nourished. No distress.   HENT:   Head: " Normocephalic and atraumatic.   Eyes: Conjunctivae are normal. Right eye exhibits no discharge. Left eye exhibits no discharge. No scleral icterus.   Neck: No JVD present.   Cardiovascular: Normal rate, regular rhythm and normal heart sounds. Exam reveals no gallop and no friction rub.   No murmur heard.  Pulmonary/Chest: Effort normal and breath sounds normal. No respiratory distress. He has no wheezes. He has no rales. He exhibits no tenderness.   Musculoskeletal: Normal range of motion. He exhibits no edema.   Neurological: He is alert and oriented to person, place, and time. No cranial nerve deficit.   Skin: Skin is warm and dry. No rash noted. No erythema. No pallor.   Psychiatric: He has a normal mood and affect. His behavior is normal.   Nursing note and vitals reviewed.      Procedure   Procedures       Assessment/Plan     Problems Addressed this Visit        Cardiovascular and Mediastinum    Essential hypertension    Relevant Orders    Stress Test With Myocardial Perfusion One Day    Adult Transthoracic Echo Complete W/ Cont if Necessary Per Protocol    Coronary artery disease involving native coronary artery of native heart without angina pectoris - Primary    Relevant Orders    Stress Test With Myocardial Perfusion One Day    Adult Transthoracic Echo Complete W/ Cont if Necessary Per Protocol       Respiratory    SOB (shortness of breath)    Relevant Orders    Stress Test With Myocardial Perfusion One Day    Adult Transthoracic Echo Complete W/ Cont if Necessary Per Protocol       Nervous and Auditory    Chest pain    Relevant Orders    Stress Test With Myocardial Perfusion One Day    Adult Transthoracic Echo Complete W/ Cont if Necessary Per Protocol      Other Visit Diagnoses     Orthostatic hypotension                Recommendation  1.  Patient with coronary artery disease with history of multivessel stenting in 2018 with residual distal LAD disease approaching 70%.  Patient is complaining of resting  chest discomfort and dyspnea that appears to be quite significant.  I do feel repeat evaluation is warranted not only to exclude ischemia but reevaluate LV systolic and diastolic performance.    2.  In that setting, stress test will be ordered for risk stratification.    3.  Echo to reevaluate the above.    4.  In regards to patient's orthostatic hypotension, would like to stop his amlodipine.  I want him to monitor blood pressure closely.  If this becomes elevated, we can always add an additional medications that may not exacerbate orthostasis.  Patient acknowledges and will call back to our office as discussed    5.  Otherwise,Patient is on statin therapy.  Last lipid parameters are reviewed, LDL was in the 70s.  I am not making changes at this time.  I will see him back for follow-up after testing.  Patient does have nitroglycerin as needed.  He will follow with primary as scheduled       Ye Butler  reports that he has quit smoking. He has never used smokeless tobacco.     Patient's Body mass index is 30.21 kg/m². BMI is above normal parameters. Recommendations include: educational material.       Electronically signed by:

## 2020-08-24 ENCOUNTER — TELEPHONE (OUTPATIENT)
Dept: CARDIOLOGY | Facility: CLINIC | Age: 70
End: 2020-08-24

## 2020-08-24 DIAGNOSIS — I10 ESSENTIAL HYPERTENSION: Primary | ICD-10-CM

## 2020-08-24 RX ORDER — AMLODIPINE BESYLATE 5 MG/1
5 TABLET ORAL DAILY
Qty: 30 TABLET | Refills: 11 | Status: SHIPPED | OUTPATIENT
Start: 2020-08-24 | End: 2020-12-01

## 2020-08-24 NOTE — TELEPHONE ENCOUNTER
Pt dropped off a BP list w/ front office staff.     I gave it to Slade to review, pt had several out of range low and high BPs. Per Slade: add Amlodipine 5 mg daily. If BP is less than 120 on top in am, hold am lisinopril dose.   (Put BP log in media basket to be scanned into pt's chart.)      First attempt to reach pt. Left a voicemail for pt to return my call at [phone], asked if okay to leave a detailed message.

## 2020-08-24 NOTE — TELEPHONE ENCOUNTER
Pt's wife rctc. Informed wife of BP instructions listed below. Wife stated she will come to the office to discuss.

## 2020-09-29 ENCOUNTER — HOSPITAL ENCOUNTER (OUTPATIENT)
Dept: CARDIOLOGY | Facility: HOSPITAL | Age: 70
Discharge: HOME | End: 2020-09-29

## 2020-09-29 DIAGNOSIS — R07.9 CHEST PAIN, UNSPECIFIED TYPE: ICD-10-CM

## 2020-09-29 DIAGNOSIS — I10 ESSENTIAL HYPERTENSION: ICD-10-CM

## 2020-09-29 DIAGNOSIS — I25.10 CORONARY ARTERY DISEASE INVOLVING NATIVE CORONARY ARTERY OF NATIVE HEART WITHOUT ANGINA PECTORIS: ICD-10-CM

## 2020-09-29 DIAGNOSIS — R06.02 SOB (SHORTNESS OF BREATH): ICD-10-CM

## 2020-09-29 PROCEDURE — 93306 TTE W/DOPPLER COMPLETE: CPT | Performed by: INTERNAL MEDICINE

## 2020-09-29 PROCEDURE — 93306 TTE W/DOPPLER COMPLETE: CPT

## 2020-09-29 PROCEDURE — 0 TECHNETIUM SESTAMIBI: Performed by: INTERNAL MEDICINE

## 2020-09-29 PROCEDURE — 78452 HT MUSCLE IMAGE SPECT MULT: CPT

## 2020-09-29 PROCEDURE — A9500 TC99M SESTAMIBI: HCPCS | Performed by: INTERNAL MEDICINE

## 2020-09-29 PROCEDURE — 93016 CV STRESS TEST SUPVJ ONLY: CPT | Performed by: NURSE PRACTITIONER

## 2020-09-29 PROCEDURE — 25010000002 REGADENOSON 0.4 MG/5ML SOLUTION: Performed by: INTERNAL MEDICINE

## 2020-09-29 PROCEDURE — 93018 CV STRESS TEST I&R ONLY: CPT | Performed by: INTERNAL MEDICINE

## 2020-09-29 PROCEDURE — 93017 CV STRESS TEST TRACING ONLY: CPT

## 2020-09-29 PROCEDURE — 78452 HT MUSCLE IMAGE SPECT MULT: CPT | Performed by: INTERNAL MEDICINE

## 2020-09-29 RX ADMIN — TECHNETIUM TC 99M SESTAMIBI 1 DOSE: 1 INJECTION INTRAVENOUS at 09:45

## 2020-09-29 RX ADMIN — TECHNETIUM TC 99M SESTAMIBI 1 DOSE: 1 INJECTION INTRAVENOUS at 07:54

## 2020-09-29 RX ADMIN — REGADENOSON 0.4 MG: 0.08 INJECTION, SOLUTION INTRAVENOUS at 09:45

## 2020-09-30 LAB
BH CV ECHO MEAS - ACS: 2 CM
BH CV ECHO MEAS - AO MAX PG: 7.6 MMHG
BH CV ECHO MEAS - AO MEAN PG: 4 MMHG
BH CV ECHO MEAS - AO ROOT AREA (BSA CORRECTED): 1.6
BH CV ECHO MEAS - AO ROOT AREA: 8.6 CM^2
BH CV ECHO MEAS - AO ROOT DIAM: 3.3 CM
BH CV ECHO MEAS - AO V2 MAX: 138 CM/SEC
BH CV ECHO MEAS - AO V2 MEAN: 94.2 CM/SEC
BH CV ECHO MEAS - AO V2 VTI: 23.6 CM
BH CV ECHO MEAS - BSA(HAYCOCK): 2.1 M^2
BH CV ECHO MEAS - BSA: 2.1 M^2
BH CV ECHO MEAS - BZI_BMI: 30.1 KILOGRAMS/M^2
BH CV ECHO MEAS - BZI_METRIC_HEIGHT: 175.3 CM
BH CV ECHO MEAS - BZI_METRIC_WEIGHT: 92.5 KG
BH CV ECHO MEAS - EDV(CUBED): 87.5 ML
BH CV ECHO MEAS - EDV(MOD-SP4): 95.2 ML
BH CV ECHO MEAS - EDV(TEICH): 89.6 ML
BH CV ECHO MEAS - EF(CUBED): 67.3 %
BH CV ECHO MEAS - EF(MOD-SP4): 66.6 %
BH CV ECHO MEAS - EF(TEICH): 59 %
BH CV ECHO MEAS - ESV(CUBED): 28.7 ML
BH CV ECHO MEAS - ESV(MOD-SP4): 31.8 ML
BH CV ECHO MEAS - ESV(TEICH): 36.7 ML
BH CV ECHO MEAS - FS: 31.1 %
BH CV ECHO MEAS - IVS/LVPW: 1.1
BH CV ECHO MEAS - IVSD: 1.6 CM
BH CV ECHO MEAS - LA DIMENSION: 4.1 CM
BH CV ECHO MEAS - LA/AO: 1.2
BH CV ECHO MEAS - LV DIASTOLIC VOL/BSA (35-75): 45.7 ML/M^2
BH CV ECHO MEAS - LV IVRT: 0.12 SEC
BH CV ECHO MEAS - LV MASS(C)D: 288.7 GRAMS
BH CV ECHO MEAS - LV MASS(C)DI: 138.6 GRAMS/M^2
BH CV ECHO MEAS - LV SYSTOLIC VOL/BSA (12-30): 15.3 ML/M^2
BH CV ECHO MEAS - LVIDD: 4.4 CM
BH CV ECHO MEAS - LVIDS: 3.1 CM
BH CV ECHO MEAS - LVLD AP4: 7.4 CM
BH CV ECHO MEAS - LVLS AP4: 6.2 CM
BH CV ECHO MEAS - LVOT AREA (M): 3.8 CM^2
BH CV ECHO MEAS - LVOT AREA: 3.8 CM^2
BH CV ECHO MEAS - LVOT DIAM: 2.2 CM
BH CV ECHO MEAS - LVPWD: 1.5 CM
BH CV ECHO MEAS - MV A MAX VEL: 83.1 CM/SEC
BH CV ECHO MEAS - MV DEC SLOPE: 222 CM/SEC^2
BH CV ECHO MEAS - MV E MAX VEL: 52.3 CM/SEC
BH CV ECHO MEAS - MV E/A: 0.63
BH CV ECHO MEAS - RAP SYSTOLE: 10 MMHG
BH CV ECHO MEAS - RVDD: 2.9 CM
BH CV ECHO MEAS - RVSP: 28.1 MMHG
BH CV ECHO MEAS - SI(AO): 96.9 ML/M^2
BH CV ECHO MEAS - SI(CUBED): 28.3 ML/M^2
BH CV ECHO MEAS - SI(MOD-SP4): 30.4 ML/M^2
BH CV ECHO MEAS - SI(TEICH): 25.4 ML/M^2
BH CV ECHO MEAS - SV(AO): 201.9 ML
BH CV ECHO MEAS - SV(CUBED): 58.9 ML
BH CV ECHO MEAS - SV(MOD-SP4): 63.4 ML
BH CV ECHO MEAS - SV(TEICH): 52.8 ML
BH CV ECHO MEAS - TR MAX VEL: 213 CM/SEC
BH CV NUCLEAR PRIOR STUDY: 2
BH CV STRESS BP STAGE 1: NORMAL
BH CV STRESS COMMENTS STAGE 1: NORMAL
BH CV STRESS DOSE REGADENOSON STAGE 1: 0.4
BH CV STRESS DURATION MIN STAGE 1: 0
BH CV STRESS DURATION SEC STAGE 1: 10
BH CV STRESS HR STAGE 1: 111
BH CV STRESS PROTOCOL 1: NORMAL
BH CV STRESS RECOVERY BP: NORMAL MMHG
BH CV STRESS RECOVERY HR: 83 BPM
BH CV STRESS STAGE 1: 1
MAXIMAL PREDICTED HEART RATE: 152 BPM
PERCENT MAX PREDICTED HR: 73.03 %
STRESS BASELINE BP: NORMAL MMHG
STRESS BASELINE HR: 68 BPM
STRESS PERCENT HR: 86 %
STRESS POST PEAK BP: NORMAL MMHG
STRESS POST PEAK HR: 111 BPM
STRESS TARGET HR: 129 BPM

## 2020-10-01 ENCOUNTER — TELEPHONE (OUTPATIENT)
Dept: CARDIOLOGY | Facility: CLINIC | Age: 70
End: 2020-10-01

## 2020-10-01 NOTE — TELEPHONE ENCOUNTER
Echo:  Left ventricular ejection fraction appears to be 56 - 60%.       Stress:  1.  No scintigraphic evidence of ischemia.     2.  Preserved post-rest ejection fraction of 56% with no focal wall motion abnormalities.     3.  No evidence of pharmacologically induced transient ischemic dilation or of increased lung uptake of radiopharmaceutical.      Keep appt on 12/3/2020 at 9am.     Called and informed pt of the above, he verbalized understanding.

## 2020-12-01 ENCOUNTER — OFFICE VISIT (OUTPATIENT)
Dept: UROLOGY | Facility: CLINIC | Age: 70
End: 2020-12-01

## 2020-12-01 VITALS — TEMPERATURE: 96.9 F | BODY MASS INDEX: 29.92 KG/M2 | HEIGHT: 69 IN | WEIGHT: 202 LBS

## 2020-12-01 DIAGNOSIS — N20.0 KIDNEY STONE: ICD-10-CM

## 2020-12-01 DIAGNOSIS — R33.8 BENIGN PROSTATIC HYPERPLASIA WITH URINARY RETENTION: Primary | ICD-10-CM

## 2020-12-01 DIAGNOSIS — N40.1 BENIGN PROSTATIC HYPERPLASIA WITH URINARY RETENTION: Primary | ICD-10-CM

## 2020-12-01 DIAGNOSIS — N39.0 URINARY TRACT INFECTION WITHOUT HEMATURIA, SITE UNSPECIFIED: ICD-10-CM

## 2020-12-01 PROCEDURE — 99205 OFFICE O/P NEW HI 60 MIN: CPT | Performed by: UROLOGY

## 2020-12-01 RX ORDER — TAMSULOSIN HYDROCHLORIDE 0.4 MG/1
1 CAPSULE ORAL DAILY
COMMUNITY
Start: 2020-10-30 | End: 2022-03-08

## 2020-12-01 RX ORDER — MEMANTINE HYDROCHLORIDE 5 MG/1
1 TABLET ORAL DAILY
COMMUNITY
Start: 2020-10-19 | End: 2021-07-21

## 2020-12-01 RX ORDER — DONEPEZIL HYDROCHLORIDE 5 MG/1
1 TABLET, FILM COATED ORAL DAILY
COMMUNITY
Start: 2020-10-16 | End: 2022-03-08

## 2020-12-01 RX ORDER — GLIMEPIRIDE 4 MG/1
1 TABLET ORAL DAILY
COMMUNITY
Start: 2020-10-17

## 2020-12-01 RX ORDER — LISINOPRIL AND HYDROCHLOROTHIAZIDE 20; 12.5 MG/1; MG/1
1 TABLET ORAL DAILY
COMMUNITY
Start: 2020-11-02 | End: 2021-06-03

## 2020-12-01 RX ORDER — SERTRALINE HYDROCHLORIDE 25 MG/1
1 TABLET, FILM COATED ORAL DAILY
COMMUNITY
Start: 2020-11-16

## 2020-12-01 NOTE — PROGRESS NOTES
Chief Complaint:          retention    HPI:   70 y.o. male.  Pt had a urolift 2 years ago and he has had recurrent infections since 2 years ago .  He has had C dif.  His psa is 0.7.  When he has an infection he goes into retention whenever he gets an infection and cath's 2 to 3 times a day until antibiotics work and he can void again.  Pt has hx of kidney stones.  HPI      Past Medical History:        Past Medical History:   Diagnosis Date   • COPD (chronic obstructive pulmonary disease) (CMS/HCC)    • Diabetes mellitus (CMS/HCC)    • Diverticulitis    • Emphysema lung (CMS/HCC)    • Hyperlipidemia    • Hypertension    • Kidney stone    • Stomach ulcer    • Stroke (CMS/HCC)          Current Meds:     Current Outpatient Medications   Medication Sig Dispense Refill   • aspirin 81 MG tablet Take 1 tablet by mouth Daily. 30 tablet 11   • donepezil (ARICEPT) 5 MG tablet Take 1 tablet by mouth Daily.     • glimepiride (AMARYL) 4 MG tablet Take 1 tablet by mouth Daily.     • levothyroxine (SYNTHROID, LEVOTHROID) 50 MCG tablet Take 50 mcg by mouth Daily.  0   • lisinopril-hydrochlorothiazide (PRINZIDE,ZESTORETIC) 20-12.5 MG per tablet Take 1 tablet by mouth Daily.     • memantine (NAMENDA) 5 MG tablet Take 1 tablet by mouth Daily.     • metFORMIN (GLUCOPHAGE) 500 MG tablet Take 500 mg by mouth 2 (Two) Times a Day With Meals.  0   • metoprolol succinate XL (TOPROL-XL) 25 MG 24 hr tablet Take 1 tablet by mouth Daily. 30 tablet 11   • nitroglycerin (NITROSTAT) 0.4 MG SL tablet 1 under the tongue as needed for angina, may repeat q5mins for up three doses 100 tablet 11   • omeprazole (priLOSEC) 20 MG capsule Take 20 mg by mouth Daily.  0   • ondansetron ODT (ZOFRAN-ODT) 4 MG disintegrating tablet Take 1 tablet by mouth 4 (Four) Times a Day. 15 tablet 0   • Sennosides-Docusate Sodium (SENNA PLUS PO) Take  by mouth 3 (Three) Times a Day.     • sertraline (ZOLOFT) 25 MG tablet Take 1 tablet by mouth Daily.     • simvastatin (ZOCOR)  40 MG tablet Take  by mouth Daily.  0   • tamsulosin (FLOMAX) 0.4 MG capsule 24 hr capsule Take 1 capsule by mouth Daily.       No current facility-administered medications for this visit.         Allergies:      Allergies   Allergen Reactions   • Claritin [Loratadine] Other (See Comments)     Possible kidney issues   • Codeine Other (See Comments)     Kidney issues         Past Surgical History:     Past Surgical History:   Procedure Laterality Date   • APPENDECTOMY     • CARDIAC CATHETERIZATION     • CORONARY STENT PLACEMENT     • ENDOSCOPY     • PAIN PUMP TRIAL     • TURP / TRANSURETHRAL INCISION / DRAINAGE PROSTATE           Social History:     Social History     Socioeconomic History   • Marital status:      Spouse name: Not on file   • Number of children: Not on file   • Years of education: Not on file   • Highest education level: Not on file   Tobacco Use   • Smoking status: Former Smoker   • Smokeless tobacco: Never Used   • Tobacco comment: quit 20+ yrs ago    Substance and Sexual Activity   • Alcohol use: No     Comment: former use 20 + yrs ago    • Drug use: No   • Sexual activity: Defer       Family History:     Family History   Problem Relation Age of Onset   • Cancer Mother    • Heart disease Father    • Stroke Father        Review of Systems:     Review of Systems      Physical Exam:     Physical Exam  Vitals signs and nursing note reviewed.   HENT:      Head: Normocephalic and atraumatic.      Right Ear: External ear normal.      Left Ear: External ear normal.      Nose: Nose normal.   Eyes:      Conjunctiva/sclera: Conjunctivae normal.      Pupils: Pupils are equal, round, and reactive to light.   Neck:      Musculoskeletal: Normal range of motion and neck supple.      Thyroid: No thyromegaly.   Cardiovascular:      Rate and Rhythm: Normal rate and regular rhythm.      Heart sounds: Normal heart sounds. No murmur.   Pulmonary:      Effort: Pulmonary effort is normal. No respiratory distress.  "     Breath sounds: Normal breath sounds. No wheezing or rales.   Chest:      Chest wall: No tenderness.   Abdominal:      General: Bowel sounds are normal. There is no distension.      Palpations: Abdomen is soft. There is no mass.      Tenderness: There is no abdominal tenderness.      Hernia: No hernia is present.   Genitourinary:     Penis: Normal.       Prostate: Normal.      Rectum: Normal.   Musculoskeletal: Normal range of motion.         General: No tenderness.   Lymphadenopathy:      Cervical: No cervical adenopathy.   Skin:     General: Skin is warm.      Findings: No rash.   Neurological:      Mental Status: He is alert and oriented to person, place, and time.      Cranial Nerves: No cranial nerve deficit.      Motor: No abnormal muscle tone.      Coordination: Coordination normal.   Psychiatric:         Behavior: Behavior normal.         Thought Content: Thought content normal.         Judgment: Judgment normal.         Temp 96.9 °F (36.1 °C)   Ht 175.3 cm (69\")   Wt 91.6 kg (202 lb)   BMI 29.83 kg/m²    Procedure:         Assessment:     Encounter Diagnosis   Name Primary?   • Benign prostatic hyperplasia with urinary retention Yes       No orders of the defined types were placed in this encounter.      Patient reports that he is not currently experiencing any symptoms of urinary incontinence.      Plan:   Will do voiding trial tomorrow and will see pt back with ct scan next week.    Patient's Body mass index is 29.83 kg/m². BMI is within normal parameters. No follow-up required..      Smoking Cessation Counseling:  Former smoker.  Patient does not currently use any tobacco products.     Counseling was given to patient for the following topics impressions as follows: foreign body and stones and retention and infection.. The interim medical history and current results were reviewed.  A treatment plan with follow-up was made for Benign prostatic hyperplasia with urinary retention [N40.1, R33.8]. "   This document has been electronically signed by Art Stokes MD December 1, 2020 16:38 EST

## 2020-12-03 ENCOUNTER — OFFICE VISIT (OUTPATIENT)
Dept: CARDIOLOGY | Facility: CLINIC | Age: 70
End: 2020-12-03

## 2020-12-03 VITALS
HEART RATE: 82 BPM | OXYGEN SATURATION: 99 % | WEIGHT: 196.4 LBS | BODY MASS INDEX: 29.09 KG/M2 | SYSTOLIC BLOOD PRESSURE: 155 MMHG | TEMPERATURE: 97.3 F | HEIGHT: 69 IN | DIASTOLIC BLOOD PRESSURE: 83 MMHG

## 2020-12-03 DIAGNOSIS — I95.1 ORTHOSTATIC HYPOTENSION: ICD-10-CM

## 2020-12-03 DIAGNOSIS — R06.02 SOB (SHORTNESS OF BREATH): ICD-10-CM

## 2020-12-03 DIAGNOSIS — I25.10 CORONARY ARTERY DISEASE INVOLVING NATIVE CORONARY ARTERY OF NATIVE HEART WITHOUT ANGINA PECTORIS: Primary | ICD-10-CM

## 2020-12-03 DIAGNOSIS — I10 ESSENTIAL HYPERTENSION: ICD-10-CM

## 2020-12-03 PROCEDURE — 99214 OFFICE O/P EST MOD 30 MIN: CPT | Performed by: PHYSICIAN ASSISTANT

## 2020-12-03 RX ORDER — NITROGLYCERIN 0.4 MG/1
TABLET SUBLINGUAL
Qty: 100 TABLET | Refills: 11 | Status: SHIPPED | OUTPATIENT
Start: 2020-12-03

## 2020-12-03 NOTE — PROGRESS NOTES
Problem list     Subjective   Ye Butler is a 70 y.o. male     Chief Complaint   Patient presents with   • Follow-up   • Coronary Artery Disease   • Shortness of Breath   roblem list  1.  Coronary artery disease  1.1 cardiac catheterization March 2018 because of refractory angina despite negative stress testing demonstrated high-grade RCA disease status post stenting.  Patient underwent stenting ×2 of the circumflex with 70% distal LAD disease and medical management recommended.  1.2 stress test October 2018 demonstrates no evidence of ischemia and preserved LV function  1.3 stress test September 2020 with no evidence of ischemia preserved LV function  2.  Accelerated Idioventricular rhythm  2.1 EKG at primary office demonstrated idioventricular rhythm at 90 bpm.  This was prior to catheterization  3.  Hypertension  4..  Dyslipidemia  5.  Type 2 diabetes mellitus  6.  Family history of premature coronary artery disease and a brother with sudden cardiac death    HPI    Patient is a 70-year-old male that presents back to the office for follow-up.  Patient is doing well.  He does not describe any chest pain or pressure.  He has stable exertional dyspnea.  No progressive shortness of breath.  No PND or orthopnea    He does not palpitate or complain syncope.  He did have some orthostatic issues.  Medications were adjusted last office visit he feels much better.  He still has dizziness upon standing but nothing that has been progressive or changing.  In fact it has improved.      Current Outpatient Medications on File Prior to Visit   Medication Sig Dispense Refill   • aspirin 81 MG tablet Take 1 tablet by mouth Daily. 30 tablet 11   • donepezil (ARICEPT) 5 MG tablet Take 1 tablet by mouth Daily.     • glimepiride (AMARYL) 4 MG tablet Take 1 tablet by mouth Daily.     • lisinopril-hydrochlorothiazide (PRINZIDE,ZESTORETIC) 20-12.5 MG per tablet Take 1 tablet by mouth Daily.     • memantine (NAMENDA) 5 MG tablet Take  1 tablet by mouth Daily.     • metFORMIN (GLUCOPHAGE) 500 MG tablet Take 500 mg by mouth 2 (Two) Times a Day With Meals.  0   • metoprolol succinate XL (TOPROL-XL) 25 MG 24 hr tablet Take 1 tablet by mouth Daily. 30 tablet 11   • omeprazole (priLOSEC) 20 MG capsule Take 20 mg by mouth Daily.  0   • ondansetron ODT (ZOFRAN-ODT) 4 MG disintegrating tablet Take 1 tablet by mouth 4 (Four) Times a Day. 15 tablet 0   • Sennosides-Docusate Sodium (SENNA PLUS PO) Take  by mouth 3 (Three) Times a Day.     • sertraline (ZOLOFT) 25 MG tablet Take 1 tablet by mouth Daily.     • simvastatin (ZOCOR) 40 MG tablet Take  by mouth Daily.  0   • tamsulosin (FLOMAX) 0.4 MG capsule 24 hr capsule Take 1 capsule by mouth Daily.     • [DISCONTINUED] nitroglycerin (NITROSTAT) 0.4 MG SL tablet 1 under the tongue as needed for angina, may repeat q5mins for up three doses 100 tablet 11   • levothyroxine (SYNTHROID, LEVOTHROID) 50 MCG tablet Take 50 mcg by mouth Daily.  0     No current facility-administered medications on file prior to visit.        Claritin [loratadine] and Codeine    Past Medical History:   Diagnosis Date   • COPD (chronic obstructive pulmonary disease) (CMS/HCC)    • Diabetes mellitus (CMS/HCC)    • Diverticulitis    • Emphysema lung (CMS/HCC)    • Hyperlipidemia    • Hypertension    • Kidney stone    • Stomach ulcer    • Stroke (CMS/HCC)        Social History     Socioeconomic History   • Marital status:      Spouse name: Not on file   • Number of children: Not on file   • Years of education: Not on file   • Highest education level: Not on file   Tobacco Use   • Smoking status: Former Smoker   • Smokeless tobacco: Never Used   • Tobacco comment: quit 20+ yrs ago    Substance and Sexual Activity   • Alcohol use: No     Comment: former use 20 + yrs ago    • Drug use: No   • Sexual activity: Defer       Family History   Problem Relation Age of Onset   • Cancer Mother    • Heart disease Father    • Stroke Father   "      Review of Systems   Constitutional: Negative for chills, fatigue and fever.   HENT: Positive for congestion. Negative for rhinorrhea and sore throat.    Respiratory: Positive for shortness of breath (due to emphysema ). Negative for apnea and chest tightness.    Cardiovascular: Negative for chest pain, palpitations and leg swelling.   Endocrine: Negative for cold intolerance and heat intolerance.   Musculoskeletal: Positive for arthralgias and back pain. Negative for gait problem and neck pain.   Skin: Negative for rash and wound.   Allergic/Immunologic: Positive for environmental allergies (seasonal allergies). Negative for food allergies.   Neurological: Positive for dizziness and numbness (occasional \"tingling\" in hands). Negative for weakness, light-headedness and headaches.   Hematological: Bruises/bleeds easily (bleeds).   Psychiatric/Behavioral: Positive for sleep disturbance (difficulty falling/staying asleep. denies SoA at HS).       Objective   Vitals:    12/03/20 0855   BP: 155/83   Pulse: 82   Temp: 97.3 °F (36.3 °C)   SpO2: 99%   Weight: 89.1 kg (196 lb 6.4 oz)   Height: 175.3 cm (69.02\")      /83   Pulse 82   Temp 97.3 °F (36.3 °C)   Ht 175.3 cm (69.02\")   Wt 89.1 kg (196 lb 6.4 oz)   SpO2 99%   BMI 28.99 kg/m²     Lab Results (most recent)     None          Physical Exam  Vitals signs and nursing note reviewed.   Constitutional:       General: He is not in acute distress.     Appearance: Normal appearance. He is well-developed.   HENT:      Head: Normocephalic and atraumatic.   Eyes:      General: No scleral icterus.        Right eye: No discharge.         Left eye: No discharge.      Conjunctiva/sclera: Conjunctivae normal.   Neck:      Vascular: No carotid bruit.   Cardiovascular:      Rate and Rhythm: Normal rate and regular rhythm.      Heart sounds: Normal heart sounds. No murmur. No friction rub. No gallop.    Pulmonary:      Effort: Pulmonary effort is normal. No respiratory " distress.      Breath sounds: Normal breath sounds. No wheezing or rales.   Chest:      Chest wall: No tenderness.   Musculoskeletal:      Right lower leg: No edema.      Left lower leg: No edema.   Skin:     General: Skin is warm and dry.      Coloration: Skin is not pale.      Findings: No erythema or rash.   Neurological:      Mental Status: He is alert and oriented to person, place, and time.      Cranial Nerves: No cranial nerve deficit.   Psychiatric:         Behavior: Behavior normal.         Procedure   Procedures       Assessment/Plan     Problems Addressed this Visit        Cardiovascular and Mediastinum    Essential hypertension    Coronary artery disease involving native coronary artery of native heart without angina pectoris - Primary    Relevant Medications    nitroglycerin (NITROSTAT) 0.4 MG SL tablet    Orthostatic hypotension       Respiratory    SOB (shortness of breath)      Diagnoses       Codes Comments    Coronary artery disease involving native coronary artery of native heart without angina pectoris    -  Primary ICD-10-CM: I25.10  ICD-9-CM: 414.01     SOB (shortness of breath)     ICD-10-CM: R06.02  ICD-9-CM: 786.05     Essential hypertension     ICD-10-CM: I10  ICD-9-CM: 401.9     Orthostatic hypotension     ICD-10-CM: I95.1  ICD-9-CM: 458.0           Recommendation  1.  Patient is a 70-year-old male with history of coronary disease without any chest discomfort.  He has stable mild levels of dyspnea that is chronic without change or progression.  Recent stress test was negative.  From our standpoint, we will continue to monitor perform risk factor modification    2.  Patient's lipids are stable.  His last LDL was in the 70s.  We will continue current statin therapy    3.  We discussed patient's orthostasis.  With adjustment of medication, it is more tolerable.  We may have to consider, if patient's symptoms were to worsen, stopping the hydrochlorothiazide.  For now he is doing well.  We  discussed lifestyle modification such as rising slowly etc.  For now we will monitor    4.  Blood pressure currently is stable.  This will continue to be monitored.  For now, we will see him back for follow-up in 6 months or sooner as symptoms discussed.  He is to follow with primary as scheduled           Ye Butler  reports that he has quit smoking. He has never used smokeless tobacco.     Patient's Body mass index is 28.99 kg/m². BMI is above normal parameters. Recommendations include: educational material.     Advance Care Planning   ACP discussion was declined by the patient. Patient does not have an advance directive, declines further assistance.    Electronically signed by:

## 2020-12-03 NOTE — PATIENT INSTRUCTIONS
Advance Care Planning and Advance Directives     You make decisions on a daily basis - decisions about where you want to live, your career, your home, your life. Perhaps one of the most important decisions you face is your choice for future medical care. Take time to talk with your family and your healthcare team and start planning today.  Advance Care Planning is a process that can help you:  · Understand possible future healthcare decisions in light of your own experiences  · Reflect on those decision in light of your goals and values  · Discuss your decisions with those closest to you and the healthcare professionals that care for you  · Make a plan by creating a document that reflects your wishes    Surrogate Decision Maker  In the event of a medical emergency, which has left you unable to communicate or to make your own decisions, you would need someone to make decisions for you.  It is important to discuss your preferences for medical treatment with this person while you are in good health.     Qualities of a surrogate decision maker:  • Willing to take on this role and responsibility  • Knows what you want for future medical care  • Willing to follow your wishes even if they don't agree with them  • Able to make difficult medical decisions under stressful circumstances    Advance Directives  These are legal documents you can create that will guide your healthcare team and decision maker(s) when needed. These documents can be stored in the electronic medical record.    · Living Will - a legal document to guide your care if you have a terminal condition or a serious illness and are unable to communicate. States vary by statute in document names/types, but most forms may include one or more of the following:        -  Directions regarding life-prolonging treatments        -  Directions regarding artificially provided nutrition/hydration        -  Choosing a healthcare decision maker        -  Direction  regarding organ/tissue donation    · Durable Power of  for Healthcare - this document names an -in-fact to make medical decisions for you, but it may also allow this person to make personal and financial decisions for you. Please seek the advice of an  if you need this type of document.    **Advance Directives are not required and no one may discriminate against you if you do not sign one.    Medical Orders  Many states allow specific forms/orders signed by your physician to record your wishes for medical treatment in your current state of health. This form, signed in personal communication with your physician, addresses resuscitation and other medical interventions that you may or may not want.      For more information or to schedule a time with a [facility] Advance Care Planning Facilitator contact: [facility] or call [phone] and someone will contact you directly.Heart-Healthy Eating Plan  Heart-healthy meal planning includes:  · Eating less unhealthy fats.  · Eating more healthy fats.  · Making other changes in your diet.  Talk with your doctor or a diet specialist (dietitian) to create an eating plan that is right for you.  What is my plan?  Your doctor may recommend an eating plan that includes:  · Total fat: ______% or less of total calories a day.  · Saturated fat: ______% or less of total calories a day.  · Cholesterol: less than _________mg a day.  What are tips for following this plan?  Cooking  Avoid frying your food. Try to bake, boil, grill, or broil it instead. You can also reduce fat by:  · Removing the skin from poultry.  · Removing all visible fats from meats.  · Steaming vegetables in water or broth.  Meal planning    · At meals, divide your plate into four equal parts:  ? Fill one-half of your plate with vegetables and green salads.  ? Fill one-fourth of your plate with whole grains.  ? Fill one-fourth of your plate with lean protein foods.  · Eat 4-5  servings of vegetables per day. A serving of vegetables is:  ? 1 cup of raw or cooked vegetables.  ? 2 cups of raw leafy greens.  · Eat 4-5 servings of fruit per day. A serving of fruit is:  ? 1 medium whole fruit.  ? ¼ cup of dried fruit.  ? ½ cup of fresh, frozen, or canned fruit.  ? ½ cup of 100% fruit juice.  · Eat more foods that have soluble fiber. These are apples, broccoli, carrots, beans, peas, and barley. Try to get 20-30 g of fiber per day.  · Eat 4-5 servings of nuts, legumes, and seeds per week:  ? 1 serving of dried beans or legumes equals ½ cup after being cooked.  ? 1 serving of nuts is ¼ cup.  ? 1 serving of seeds equals 1 tablespoon.  General information  · Eat more home-cooked food. Eat less restaurant, buffet, and fast food.  · Limit or avoid alcohol.  · Limit foods that are high in starch and sugar.  · Avoid fried foods.  · Lose weight if you are overweight.  · Keep track of how much salt (sodium) you eat. This is important if you have high blood pressure. Ask your doctor to tell you more about this.  · Try to add vegetarian meals each week.  Fats  · Choose healthy fats. These include olive oil and canola oil, flaxseeds, walnuts, almonds, and seeds.  · Eat more omega-3 fats. These include salmon, mackerel, sardines, tuna, flaxseed oil, and ground flaxseeds. Try to eat fish at least 2 times each week.  · Check food labels. Avoid foods with trans fats or high amounts of saturated fat.  · Limit saturated fats.  ? These are often found in animal products, such as meats, butter, and cream.  ? These are also found in plant foods, such as palm oil, palm kernel oil, and coconut oil.  · Avoid foods with partially hydrogenated oils in them. These have trans fats. Examples are stick margarine, some tub margarines, cookies, crackers, and other baked goods.  What foods can I eat?  Fruits  All fresh, canned (in natural juice), or frozen fruits.  Vegetables  Fresh or frozen vegetables (raw, steamed, roasted,  or grilled). Green salads.  Grains  Most grains. Choose whole wheat and whole grains most of the time. Rice and pasta, including brown rice and pastas made with whole wheat.  Meats and other proteins  Lean, well-trimmed beef, veal, pork, and lamb. Chicken and turkey without skin. All fish and shellfish. Wild duck, rabbit, pheasant, and venison. Egg whites or low-cholesterol egg substitutes. Dried beans, peas, lentils, and tofu. Seeds and most nuts.  Dairy  Low-fat or nonfat cheeses, including ricotta and mozzarella. Skim or 1% milk that is liquid, powdered, or evaporated. Buttermilk that is made with low-fat milk. Nonfat or low-fat yogurt.  Fats and oils  Non-hydrogenated (trans-free) margarines. Vegetable oils, including soybean, sesame, sunflower, olive, peanut, safflower, corn, canola, and cottonseed. Salad dressings or mayonnaise made with a vegetable oil.  Beverages  Mineral water. Coffee and tea. Diet carbonated beverages.  Sweets and desserts  Sherbet, gelatin, and fruit ice. Small amounts of dark chocolate.  Limit all sweets and desserts.  Seasonings and condiments  All seasonings and condiments.  The items listed above may not be a complete list of foods and drinks you can eat. Contact a dietitian for more options.  What foods should I avoid?  Fruits  Canned fruit in heavy syrup. Fruit in cream or butter sauce. Fried fruit. Limit coconut.  Vegetables  Vegetables cooked in cheese, cream, or butter sauce. Fried vegetables.  Grains  Breads that are made with saturated or trans fats, oils, or whole milk. Croissants. Sweet rolls. Donuts. High-fat crackers, such as cheese crackers.  Meats and other proteins  Fatty meats, such as hot dogs, ribs, sausage, diamond, rib-eye roast or steak. High-fat deli meats, such as salami and bologna. Caviar. Domestic duck and goose. Organ meats, such as liver.  Dairy  Cream, sour cream, cream cheese, and creamed cottage cheese. Whole-milk cheeses. Whole or 2% milk that is liquid,  evaporated, or condensed. Whole buttermilk. Cream sauce or high-fat cheese sauce. Yogurt that is made from whole milk.  Fats and oils  Meat fat, or shortening. Cocoa butter, hydrogenated oils, palm oil, coconut oil, palm kernel oil. Solid fats and shortenings, including diamond fat, salt pork, lard, and butter. Nondairy cream substitutes. Salad dressings with cheese or sour cream.  Beverages  Regular sodas and juice drinks with added sugar.  Sweets and desserts  Frosting. Pudding. Cookies. Cakes. Pies. Milk chocolate or white chocolate. Buttered syrups. Full-fat ice cream or ice cream drinks.  The items listed above may not be a complete list of foods and drinks to avoid. Contact a dietitian for more information.  Summary  · Heart-healthy meal planning includes eating less unhealthy fats, eating more healthy fats, and making other changes in your diet.  · Eat a balanced diet. This includes fruits and vegetables, low-fat or nonfat dairy, lean protein, nuts and legumes, whole grains, and heart-healthy oils and fats.  This information is not intended to replace advice given to you by your health care provider. Make sure you discuss any questions you have with your health care provider.  Document Revised: 02/21/2019 Document Reviewed: 01/25/2019  [employer] Patient Education © 2020 [employer]

## 2020-12-07 ENCOUNTER — HOSPITAL ENCOUNTER (OUTPATIENT)
Dept: CT IMAGING | Facility: HOSPITAL | Age: 70
Discharge: HOME | End: 2020-12-07
Admitting: UROLOGY

## 2020-12-07 DIAGNOSIS — N20.0 KIDNEY STONE: ICD-10-CM

## 2020-12-07 PROCEDURE — 74176 CT ABD & PELVIS W/O CONTRAST: CPT | Performed by: RADIOLOGY

## 2020-12-07 PROCEDURE — 74176 CT ABD & PELVIS W/O CONTRAST: CPT

## 2020-12-08 ENCOUNTER — OFFICE VISIT (OUTPATIENT)
Dept: UROLOGY | Facility: CLINIC | Age: 70
End: 2020-12-08

## 2020-12-08 VITALS — BODY MASS INDEX: 29.03 KG/M2 | TEMPERATURE: 98.7 F | WEIGHT: 196 LBS | HEIGHT: 69 IN

## 2020-12-08 DIAGNOSIS — N39.0 URINARY TRACT INFECTION WITHOUT HEMATURIA, SITE UNSPECIFIED: Primary | ICD-10-CM

## 2020-12-08 LAB
BILIRUB BLD-MCNC: NEGATIVE MG/DL
CLARITY, POC: ABNORMAL
COLOR UR: YELLOW
GLUCOSE UR STRIP-MCNC: NEGATIVE MG/DL
KETONES UR QL: NEGATIVE
LEUKOCYTE EST, POC: ABNORMAL
NITRITE UR-MCNC: NEGATIVE MG/ML
PH UR: 5.5 [PH] (ref 5–8)
PROT UR STRIP-MCNC: ABNORMAL MG/DL
RBC # UR STRIP: ABNORMAL /UL
SP GR UR: 1.02 (ref 1–1.03)
UROBILINOGEN UR QL: NORMAL

## 2020-12-08 PROCEDURE — 81003 URINALYSIS AUTO W/O SCOPE: CPT | Performed by: UROLOGY

## 2020-12-08 PROCEDURE — 87077 CULTURE AEROBIC IDENTIFY: CPT | Performed by: UROLOGY

## 2020-12-08 PROCEDURE — 99214 OFFICE O/P EST MOD 30 MIN: CPT | Performed by: UROLOGY

## 2020-12-08 PROCEDURE — 87086 URINE CULTURE/COLONY COUNT: CPT | Performed by: UROLOGY

## 2020-12-08 PROCEDURE — 87186 SC STD MICRODIL/AGAR DIL: CPT | Performed by: UROLOGY

## 2020-12-08 NOTE — PROGRESS NOTES
Chief Complaint:          Recurrent infections, bilateral kidney stones and Prostate urolift prosthesis.    HPI:   70 y.o. male.  I discussed how the stones or the prostate foreign bodies could cause his recurrnent infectionss.  HPI      Past Medical History:        Past Medical History:   Diagnosis Date   • COPD (chronic obstructive pulmonary disease) (CMS/HCC)    • Diabetes mellitus (CMS/HCC)    • Diverticulitis    • Emphysema lung (CMS/HCC)    • Hyperlipidemia    • Hypertension    • Kidney stone    • Stomach ulcer    • Stroke (CMS/HCC)          Current Meds:     Current Outpatient Medications   Medication Sig Dispense Refill   • aspirin 81 MG tablet Take 1 tablet by mouth Daily. 30 tablet 11   • donepezil (ARICEPT) 5 MG tablet Take 1 tablet by mouth Daily.     • glimepiride (AMARYL) 4 MG tablet Take 1 tablet by mouth Daily.     • levothyroxine (SYNTHROID, LEVOTHROID) 50 MCG tablet Take 50 mcg by mouth Daily.  0   • lisinopril-hydrochlorothiazide (PRINZIDE,ZESTORETIC) 20-12.5 MG per tablet Take 1 tablet by mouth Daily.     • memantine (NAMENDA) 5 MG tablet Take 1 tablet by mouth Daily.     • metFORMIN (GLUCOPHAGE) 500 MG tablet Take 500 mg by mouth 2 (Two) Times a Day With Meals.  0   • metoprolol succinate XL (TOPROL-XL) 25 MG 24 hr tablet Take 1 tablet by mouth Daily. 30 tablet 11   • nitroglycerin (NITROSTAT) 0.4 MG SL tablet 1 under the tongue as needed for angina, may repeat q5mins for up three doses 100 tablet 11   • omeprazole (priLOSEC) 20 MG capsule Take 20 mg by mouth Daily.  0   • ondansetron ODT (ZOFRAN-ODT) 4 MG disintegrating tablet Take 1 tablet by mouth 4 (Four) Times a Day. 15 tablet 0   • Sennosides-Docusate Sodium (SENNA PLUS PO) Take  by mouth 3 (Three) Times a Day.     • sertraline (ZOLOFT) 25 MG tablet Take 1 tablet by mouth Daily.     • simvastatin (ZOCOR) 40 MG tablet Take  by mouth Daily.  0   • tamsulosin (FLOMAX) 0.4 MG capsule 24 hr capsule Take 1 capsule by mouth Daily.       No  current facility-administered medications for this visit.         Allergies:      Allergies   Allergen Reactions   • Claritin [Loratadine] Other (See Comments)     Possible kidney issues   • Codeine Other (See Comments)     Kidney issues         Past Surgical History:     Past Surgical History:   Procedure Laterality Date   • APPENDECTOMY     • CARDIAC CATHETERIZATION     • CORONARY STENT PLACEMENT     • ENDOSCOPY     • PAIN PUMP TRIAL     • TURP / TRANSURETHRAL INCISION / DRAINAGE PROSTATE           Social History:     Social History     Socioeconomic History   • Marital status:      Spouse name: Not on file   • Number of children: Not on file   • Years of education: Not on file   • Highest education level: Not on file   Tobacco Use   • Smoking status: Former Smoker   • Smokeless tobacco: Never Used   • Tobacco comment: quit 20+ yrs ago    Substance and Sexual Activity   • Alcohol use: No     Comment: former use 20 + yrs ago    • Drug use: No   • Sexual activity: Defer       Family History:     Family History   Problem Relation Age of Onset   • Cancer Mother    • Heart disease Father    • Stroke Father        Review of Systems:     Review of Systems   Constitutional: Negative for chills and fever.   HENT: Negative for sinus pressure.    Respiratory: Negative for cough.    Cardiovascular: Negative for leg swelling.   Gastrointestinal: Negative for abdominal pain.   Genitourinary: Negative for frequency and urgency.   Musculoskeletal: Negative for back pain.   Neurological: Negative for headaches.   Psychiatric/Behavioral: The patient is not nervous/anxious.        IPSS Questionnaire (AUA-7):  Over the past month…    1)  Incomplete Emptying  How often have you had a sensation of not emptying your bladder?  0 - Not at all   2)  Frequency  How often have you had to urinate less than every two hours? 0 - Not at all   3)  Intermittency  How often have you found you stopped and started again several times when you  "urinated?  0 - Not at all   4) Urgency  How often have you found it difficult to postpone urination?  0 - Not at all   5) Weak Stream  How often have you had a weak urinary stream?  0 - Not at all   6) Straining  How often have you had to push or strain to begin urination?  0 - Not at all   7) Nocturia  How many times did you typically get up at night to urinate?  3 - 3 times   Total Score:  3       Quality of life due to urinary symptoms:  If you were to spend the rest of your life with your urinary condition the way it is now, how would you feel about that? 1-Pleased   Urine Leakage (Incontinence) 0-No Leakage       I have reviewed the follow portions of the patient's history and confirmed they are accurate today:  allergies, current medications, past family history, past medical history, past social history, past surgical history, problem list and ROS  Physical Exam:     Physical Exam    Temp 98.7 °F (37.1 °C)   Ht 175.3 cm (69.02\")   Wt 88.9 kg (196 lb)   BMI 28.93 kg/m²    Procedure:         Assessment:   No diagnosis found.    No orders of the defined types were placed in this encounter.      Patient reports that he is not currently experiencing any symptoms of urinary incontinence.      Plan:   Will treat with antibiotics per sensitivities and will schedule office cystoscopy for foreign bodies.    Patient's Body mass index is 28.93 kg/m². BMI is within normal parameters. No follow-up required..      Smoking Cessation Counseling:  Current every day smoker. less than 3 minutes spent counseling. Agreeable to stop.  Patient is going to follow up with PCP to discuss treatment/medication options to quit tobacco use.   Counseling was given to patient and family for the following topics instructions for management as follows: stones and urolift.. The interim medical history and current results were reviewed.  A treatment plan with follow-up was made for No primary diagnosis found.. I spent 26 minutes face to face " with Ye Butler and 80 percentage was spent in counseling.       This document has been electronically signed by Art Stokes MD December 8, 2020 09:03 EST

## 2020-12-10 LAB — BACTERIA SPEC AEROBE CULT: ABNORMAL

## 2020-12-18 ENCOUNTER — PROCEDURE VISIT (OUTPATIENT)
Dept: UROLOGY | Facility: CLINIC | Age: 70
End: 2020-12-18

## 2020-12-18 ENCOUNTER — HOSPITAL ENCOUNTER (OUTPATIENT)
Dept: GENERAL RADIOLOGY | Facility: HOSPITAL | Age: 70
Discharge: HOME | End: 2020-12-18
Admitting: UROLOGY

## 2020-12-18 VITALS — TEMPERATURE: 96.9 F | BODY MASS INDEX: 30.51 KG/M2 | WEIGHT: 206 LBS | HEIGHT: 69 IN

## 2020-12-18 DIAGNOSIS — N20.0 KIDNEY STONE: ICD-10-CM

## 2020-12-18 DIAGNOSIS — N39.0 RECURRENT UTI: ICD-10-CM

## 2020-12-18 DIAGNOSIS — R35.0 FREQUENCY OF MICTURITION: ICD-10-CM

## 2020-12-18 DIAGNOSIS — N39.0 RECURRENT UTI: Primary | ICD-10-CM

## 2020-12-18 LAB
BILIRUB BLD-MCNC: NEGATIVE MG/DL
CLARITY, POC: ABNORMAL
COLOR UR: YELLOW
GLUCOSE UR STRIP-MCNC: NEGATIVE MG/DL
KETONES UR QL: NEGATIVE
LEUKOCYTE EST, POC: ABNORMAL
NITRITE UR-MCNC: POSITIVE MG/ML
PH UR: 5.5 [PH] (ref 5–8)
PROT UR STRIP-MCNC: NEGATIVE MG/DL
RBC # UR STRIP: ABNORMAL /UL
SP GR UR: 1.02 (ref 1–1.03)
UROBILINOGEN UR QL: NORMAL

## 2020-12-18 PROCEDURE — 74018 RADEX ABDOMEN 1 VIEW: CPT

## 2020-12-18 PROCEDURE — 87186 SC STD MICRODIL/AGAR DIL: CPT | Performed by: UROLOGY

## 2020-12-18 PROCEDURE — 74018 RADEX ABDOMEN 1 VIEW: CPT | Performed by: RADIOLOGY

## 2020-12-18 PROCEDURE — 81003 URINALYSIS AUTO W/O SCOPE: CPT | Performed by: UROLOGY

## 2020-12-18 PROCEDURE — 87086 URINE CULTURE/COLONY COUNT: CPT | Performed by: UROLOGY

## 2020-12-18 PROCEDURE — 99214 OFFICE O/P EST MOD 30 MIN: CPT | Performed by: UROLOGY

## 2020-12-18 PROCEDURE — 87077 CULTURE AEROBIC IDENTIFY: CPT | Performed by: UROLOGY

## 2020-12-18 RX ORDER — CIPROFLOXACIN 250 MG/1
250 TABLET, FILM COATED ORAL 2 TIMES DAILY
Qty: 40 TABLET | Refills: 0 | Status: SHIPPED | OUTPATIENT
Start: 2020-12-18 | End: 2021-03-03

## 2020-12-18 NOTE — PROGRESS NOTES
HPI:       HPI      Procedure:      Procedure: Cystoscopy Male    Indication: ***    Urinalysis Performed Today:  Negative for Infection    Informed Consent Obtained    Sterile prep performed in usual fashion    11 cc of topical lidocaine inserted urethrally for 10 minutes    Flexible cystoscope inserted and bladder examined    Findings: ***    Additional Procedure with Cystoscopy: {Additional Cystoscopy Procedures Male:24026}      Discussion:      ***    Assessment:   No diagnosis found.  No orders of the defined types were placed in this encounter.

## 2020-12-18 NOTE — PROGRESS NOTES
Chief Complaint:          Pt has had recurrent urinary infections    HPI:   70 y.o. male.  Pt has had recurrent infections for 2 years.  He kellogg not have dysuria, but does have bladder pain.  His last urine culture grew out Klebsiella.  Pt has had a ct scan that shows bilateral renal stones that are large.  On the right side he has a 1.6 cm renal pelvic stone and a 2.5 cm partial staghorn.  On the left side he has a 1 cm stone in the lower pole.  Pt has hx of diverticulitis but denies pneumoturia.    HPI      Past Medical History:        Past Medical History:   Diagnosis Date   • COPD (chronic obstructive pulmonary disease) (CMS/[facility])    • Diabetes mellitus (CMS/[facility])    • Diverticulitis    • Emphysema lung (CMS/[facility])    • Hyperlipidemia    • Hypertension    • Kidney stone    • Stomach ulcer    • Stroke (CMS/[facility])          Current Meds:     Current Outpatient Medications   Medication Sig Dispense Refill   • aspirin 81 MG tablet Take 1 tablet by mouth Daily. 30 tablet 11   • donepezil (ARICEPT) 5 MG tablet Take 1 tablet by mouth Daily.     • glimepiride (AMARYL) 4 MG tablet Take 1 tablet by mouth Daily.     • levothyroxine (SYNTHROID, LEVOTHROID) 50 MCG tablet Take 50 mcg by mouth Daily.  0   • lisinopril-hydrochlorothiazide (PRINZIDE,ZESTORETIC) 20-12.5 MG per tablet Take 1 tablet by mouth Daily.     • memantine (NAMENDA) 5 MG tablet Take 1 tablet by mouth Daily.     • metFORMIN (GLUCOPHAGE) 500 MG tablet Take 500 mg by mouth 2 (Two) Times a Day With Meals.  0   • metoprolol succinate XL (TOPROL-XL) 25 MG 24 hr tablet Take 1 tablet by mouth Daily. 30 tablet 11   • nitroglycerin (NITROSTAT) 0.4 MG SL tablet 1 under the tongue as needed for angina, may repeat q5mins for up three doses 100 tablet 11   • omeprazole (priLOSEC) 20 MG capsule Take 20 mg by mouth Daily.  0   • ondansetron ODT (ZOFRAN-ODT) 4 MG disintegrating tablet Take 1 tablet by mouth 4 (Four) Times a Day. 15 tablet 0   • Sennosides-Docusate Sodium (SENNA  PLUS PO) Take  by mouth 3 (Three) Times a Day.     • sertraline (ZOLOFT) 25 MG tablet Take 1 tablet by mouth Daily.     • simvastatin (ZOCOR) 40 MG tablet Take  by mouth Daily.  0   • tamsulosin (FLOMAX) 0.4 MG capsule 24 hr capsule Take 1 capsule by mouth Daily.       No current facility-administered medications for this visit.         Allergies:      Allergies   Allergen Reactions   • Claritin [Loratadine] Other (See Comments)     Possible kidney issues   • Codeine Other (See Comments)     Kidney issues         Past Surgical History:     Past Surgical History:   Procedure Laterality Date   • APPENDECTOMY     • CARDIAC CATHETERIZATION     • CORONARY STENT PLACEMENT     • ENDOSCOPY     • PAIN PUMP TRIAL     • TURP / TRANSURETHRAL INCISION / DRAINAGE PROSTATE           Social History:     Social History     Socioeconomic History   • Marital status:      Spouse name: Not on file   • Number of children: Not on file   • Years of education: Not on file   • Highest education level: Not on file   Tobacco Use   • Smoking status: Former Smoker   • Smokeless tobacco: Never Used   • Tobacco comment: quit 20+ yrs ago    Substance and Sexual Activity   • Alcohol use: No     Comment: former use 20 + yrs ago    • Drug use: No   • Sexual activity: Defer       Family History:     Family History   Problem Relation Age of Onset   • Cancer Mother    • Heart disease Father    • Stroke Father        Review of Systems:     Review of Systems      Physical Exam:     Physical Exam  Vitals signs and nursing note reviewed.   HENT:      Head: Normocephalic and atraumatic.      Right Ear: External ear normal.      Left Ear: External ear normal.      Nose: Nose normal.   Eyes:      Conjunctiva/sclera: Conjunctivae normal.      Pupils: Pupils are equal, round, and reactive to light.   Neck:      Musculoskeletal: Normal range of motion and neck supple.      Thyroid: No thyromegaly.   Cardiovascular:      Rate and Rhythm: Normal rate and  "regular rhythm.      Heart sounds: Normal heart sounds. No murmur.   Pulmonary:      Effort: Pulmonary effort is normal. No respiratory distress.      Breath sounds: Normal breath sounds. No wheezing or rales.   Chest:      Chest wall: No tenderness.   Abdominal:      General: Bowel sounds are normal. There is no distension.      Palpations: Abdomen is soft. There is no mass.      Tenderness: There is no abdominal tenderness.      Hernia: No hernia is present.   Genitourinary:     Penis: Normal.       Prostate: Normal.      Rectum: Normal.   Musculoskeletal: Normal range of motion.         General: No tenderness.   Lymphadenopathy:      Cervical: No cervical adenopathy.   Skin:     General: Skin is warm.      Findings: No rash.   Neurological:      Mental Status: He is alert and oriented to person, place, and time.      Cranial Nerves: No cranial nerve deficit.      Motor: No abnormal muscle tone.      Coordination: Coordination normal.   Psychiatric:         Behavior: Behavior normal.         Thought Content: Thought content normal.         Judgment: Judgment normal.         Temp 96.9 °F (36.1 °C)   Ht 175.3 cm (69.02\")   Wt 93.4 kg (206 lb)   BMI 30.40 kg/m²    Procedure:         Assessment:     Encounter Diagnoses   Name Primary?   • Recurrent UTI Yes   • Frequency of micturition        Orders Placed This Encounter   Procedures   • Urine Culture - Urine, Urine, Random Void   • POC Urinalysis Dipstick, Automated       Patient reports that he is not currently experiencing any symptoms of urinary incontinence.      Plan:   Will check a KUB and will treat pt with cipro for 20 days.  Will see him back to repeat his UA and UC and schedule him for treatment of his stones.    Patient's Body mass index is 30.4 kg/m². BMI is within normal parameters. No follow-up required..      Smoking Cessation Counseling:  Former smoker.  Patient does not currently use any tobacco products.   Counseling was given to patient and " family for the following topics instructions for management as follows: stones and infection.. The interim medical history and current results were reviewed.  A treatment plan with follow-up was made for Recurrent UTI [N39.0]. I spent 37 minutes face to face with Ye Butler and 80 percentage was spent in counseling.       This document has been electronically signed by Art Stokes MD December 18, 2020 09:48 EST

## 2020-12-20 LAB — BACTERIA SPEC AEROBE CULT: ABNORMAL

## 2020-12-21 ENCOUNTER — TELEPHONE (OUTPATIENT)
Dept: UROLOGY | Facility: CLINIC | Age: 70
End: 2020-12-21

## 2020-12-21 NOTE — TELEPHONE ENCOUNTER
Correct med was sent in when the pt came in for his appt. With the pt living in [facility], it makes it hard for them to get out around here so I explained it would be okay to just come for the appt and we will recheck the urine then - pt has an appt on 1-19/21

## 2020-12-21 NOTE — TELEPHONE ENCOUNTER
----- Message from Art Stokes MD sent at 12/21/2020  8:11 AM EST -----  Tell pt his urine culture results and that the bacteria is sensitive to cipro and plan is treat for 20 days then repeat urine culture a week off antibiotics then see me.  ----- Message -----  From: Wilner Fuentes MA  Sent: 12/18/2020   9:11 AM EST  To: Art Stokes MD

## 2021-01-19 ENCOUNTER — OFFICE VISIT (OUTPATIENT)
Dept: UROLOGY | Facility: CLINIC | Age: 71
End: 2021-01-19

## 2021-01-19 ENCOUNTER — HOSPITAL ENCOUNTER (OUTPATIENT)
Dept: GENERAL RADIOLOGY | Facility: HOSPITAL | Age: 71
Discharge: HOME | End: 2021-01-19
Admitting: UROLOGY

## 2021-01-19 VITALS — HEIGHT: 69 IN | BODY MASS INDEX: 30.5 KG/M2 | TEMPERATURE: 96.3 F | WEIGHT: 205.91 LBS

## 2021-01-19 DIAGNOSIS — T19.1XXS: ICD-10-CM

## 2021-01-19 DIAGNOSIS — N20.0 KIDNEY STONE: ICD-10-CM

## 2021-01-19 DIAGNOSIS — N39.0 RECURRENT UTI: Primary | ICD-10-CM

## 2021-01-19 LAB
BILIRUB BLD-MCNC: NEGATIVE MG/DL
CLARITY, POC: CLEAR
COLOR UR: YELLOW
GLUCOSE UR STRIP-MCNC: NEGATIVE MG/DL
KETONES UR QL: NEGATIVE
LEUKOCYTE EST, POC: NEGATIVE
NITRITE UR-MCNC: NEGATIVE MG/ML
PH UR: 5 [PH] (ref 5–8)
PROT UR STRIP-MCNC: NEGATIVE MG/DL
RBC # UR STRIP: ABNORMAL /UL
SP GR UR: 1.02 (ref 1–1.03)
UROBILINOGEN UR QL: NORMAL

## 2021-01-19 PROCEDURE — 81003 URINALYSIS AUTO W/O SCOPE: CPT | Performed by: UROLOGY

## 2021-01-19 PROCEDURE — 74018 RADEX ABDOMEN 1 VIEW: CPT | Performed by: RADIOLOGY

## 2021-01-19 PROCEDURE — 87086 URINE CULTURE/COLONY COUNT: CPT | Performed by: UROLOGY

## 2021-01-19 PROCEDURE — 99214 OFFICE O/P EST MOD 30 MIN: CPT | Performed by: UROLOGY

## 2021-01-19 PROCEDURE — 74018 RADEX ABDOMEN 1 VIEW: CPT

## 2021-01-19 NOTE — PROGRESS NOTES
Chief Complaint:          Recurrent infections    HPI:   70 y.o. male.  Pt had a urolift procedure was 2 years ago and he has had recurrent infections since.  He also has a large staghorn stone in the right kidney.  His UA is negative today.  He is voiding well today  Has not had to cath himself for a month.  He has not had cystoscopy yet because of his Klebsiella infection.  HPI  His ct scan shows bilateral stones right greater than left with the right there is a 1.5 renal pelvic stone and 2.5 cm mid pole stone.  The left side had a 1 cm lower pole stone.    Past Medical History:        Past Medical History:   Diagnosis Date   • COPD (chronic obstructive pulmonary disease) (CMS/[facility])    • Diabetes mellitus (CMS/H[facility])    • Diverticulitis    • Emphysema lung (CMS/HCC)    • Hyperlipidemia    • Hypertension    • Kidney stone    • Stomach ulcer    • Stroke (CMS/[facility])          Current Meds:     Current Outpatient Medications   Medication Sig Dispense Refill   • aspirin 81 MG tablet Take 1 tablet by mouth Daily. 30 tablet 11   • ciprofloxacin (Cipro) 250 MG tablet Take 1 tablet by mouth 2 (Two) Times a Day. 40 tablet 0   • donepezil (ARICEPT) 5 MG tablet Take 1 tablet by mouth Daily.     • glimepiride (AMARYL) 4 MG tablet Take 1 tablet by mouth Daily.     • levothyroxine (SYNTHROID, LEVOTHROID) 50 MCG tablet Take 50 mcg by mouth Daily.  0   • lisinopril-hydrochlorothiazide (PRINZIDE,ZESTORETIC) 20-12.5 MG per tablet Take 1 tablet by mouth Daily.     • memantine (NAMENDA) 5 MG tablet Take 1 tablet by mouth Daily.     • metFORMIN (GLUCOPHAGE) 500 MG tablet Take 500 mg by mouth 2 (Two) Times a Day With Meals.  0   • metoprolol succinate XL (TOPROL-XL) 25 MG 24 hr tablet Take 1 tablet by mouth Daily. 30 tablet 11   • nitroglycerin (NITROSTAT) 0.4 MG SL tablet 1 under the tongue as needed for angina, may repeat q5mins for up three doses 100 tablet 11   • omeprazole (priLOSEC) 20 MG capsule Take 20 mg by mouth Daily.  0   •  ondansetron ODT (ZOFRAN-ODT) 4 MG disintegrating tablet Take 1 tablet by mouth 4 (Four) Times a Day. 15 tablet 0   • Sennosides-Docusate Sodium (SENNA PLUS PO) Take  by mouth 3 (Three) Times a Day.     • sertraline (ZOLOFT) 25 MG tablet Take 1 tablet by mouth Daily.     • simvastatin (ZOCOR) 40 MG tablet Take  by mouth Daily.  0   • tamsulosin (FLOMAX) 0.4 MG capsule 24 hr capsule Take 1 capsule by mouth Daily.       No current facility-administered medications for this visit.         Allergies:      Allergies   Allergen Reactions   • Claritin [Loratadine] Other (See Comments)     Possible kidney issues   • Codeine Other (See Comments)     Kidney issues         Past Surgical History:     Past Surgical History:   Procedure Laterality Date   • APPENDECTOMY     • CARDIAC CATHETERIZATION     • CORONARY STENT PLACEMENT     • ENDOSCOPY     • PAIN PUMP TRIAL     • TURP / TRANSURETHRAL INCISION / DRAINAGE PROSTATE           Social History:     Social History     Socioeconomic History   • Marital status:      Spouse name: Not on file   • Number of children: Not on file   • Years of education: Not on file   • Highest education level: Not on file   Tobacco Use   • Smoking status: Former Smoker   • Smokeless tobacco: Never Used   • Tobacco comment: quit 20+ yrs ago    Substance and Sexual Activity   • Alcohol use: No     Comment: former use 20 + yrs ago    • Drug use: No   • Sexual activity: Defer       Family History:     Family History   Problem Relation Age of Onset   • Cancer Mother    • Heart disease Father    • Stroke Father        Review of Systems:     Review of Systems   Constitutional: Negative for fatigue and fever.   HENT: Negative for facial swelling and sinus pain.    Respiratory: Negative for chest tightness.    Cardiovascular: Negative for chest pain and palpitations.   Gastrointestinal: Negative for abdominal pain.   Genitourinary: Negative for frequency, hematuria and urgency.   Musculoskeletal:  Negative for back pain.   Neurological: Negative for weakness.   Psychiatric/Behavioral: Negative for agitation.       I have reviewed the follow portions of the patient's history and confirmed they are accurate today:  allergies, current medications, past family history, past medical history, past social history, past surgical history, problem list and ROS  Physical Exam:     Physical Exam  Vitals signs and nursing note reviewed.   HENT:      Head: Normocephalic and atraumatic.      Right Ear: External ear normal.      Left Ear: External ear normal.      Nose: Nose normal.   Eyes:      Conjunctiva/sclera: Conjunctivae normal.      Pupils: Pupils are equal, round, and reactive to light.   Neck:      Musculoskeletal: Normal range of motion and neck supple.      Thyroid: No thyromegaly.   Cardiovascular:      Rate and Rhythm: Normal rate and regular rhythm.      Heart sounds: Normal heart sounds. No murmur.   Pulmonary:      Effort: Pulmonary effort is normal. No respiratory distress.      Breath sounds: Normal breath sounds. No wheezing or rales.   Chest:      Chest wall: No tenderness.   Abdominal:      General: Bowel sounds are normal. There is no distension.      Palpations: Abdomen is soft. There is no mass.      Tenderness: There is no abdominal tenderness.      Hernia: No hernia is present.   Genitourinary:     Penis: Normal.       Prostate: Normal.      Rectum: Normal.   Musculoskeletal: Normal range of motion.         General: No tenderness.   Lymphadenopathy:      Cervical: No cervical adenopathy.   Skin:     General: Skin is warm.      Findings: No rash.   Neurological:      Mental Status: He is alert and oriented to person, place, and time.      Cranial Nerves: No cranial nerve deficit.      Motor: No abnormal muscle tone.      Coordination: Coordination normal.   Psychiatric:         Behavior: Behavior normal.         Thought Content: Thought content normal.         Judgment: Judgment normal.         Temp  "96.3 °F (35.7 °C)   Ht 175.3 cm (69.02\")   Wt 93.4 kg (205 lb 14.6 oz)   BMI 30.39 kg/m²    Procedure:         Assessment:     Encounter Diagnosis   Name Primary?   • Recurrent UTI Yes       No orders of the defined types were placed in this encounter.      Patient reports that he is not currently experiencing any symptoms of urinary incontinence.      Plan:   Will get kub and send urine for culture and schedule cystoscopy in a month.    Patient's Body mass index is 30.39 kg/m². BMI is within normal parameters. No follow-up required..      Smoking Cessation Counseling:  Former smoker.  Patient does not currently use any tobacco products.     Counseling was given to patient and family for the following topics impressions as follows: stones and foreign body Uro Lift.. The interim medical history and current results were reviewed.  A treatment plan with follow-up was made for Recurrent UTI [N39.0]. I spent 31 minutes face to face with Ye Butler and 80 percentage was spent in counseling.       This document has been electronically signed by Art Stokes MD January 19, 2021 10:27 EST      "

## 2021-01-20 LAB — BACTERIA SPEC AEROBE CULT: NO GROWTH

## 2021-03-03 ENCOUNTER — HOSPITAL ENCOUNTER (OUTPATIENT)
Facility: HOSPITAL | Age: 71
Setting detail: HOSPITAL OUTPATIENT SURGERY
End: 2021-03-03
Attending: UROLOGY | Admitting: UROLOGY

## 2021-03-03 ENCOUNTER — PROCEDURE VISIT (OUTPATIENT)
Dept: UROLOGY | Facility: CLINIC | Age: 71
End: 2021-03-03

## 2021-03-03 VITALS — BODY MASS INDEX: 30.39 KG/M2 | HEIGHT: 69 IN

## 2021-03-03 DIAGNOSIS — N20.0 STAGHORN KIDNEY STONES: ICD-10-CM

## 2021-03-03 DIAGNOSIS — N39.0 RECURRENT UTI: Primary | ICD-10-CM

## 2021-03-03 DIAGNOSIS — N20.0 KIDNEY STONE: ICD-10-CM

## 2021-03-03 DIAGNOSIS — R35.0 FREQUENCY OF MICTURITION: ICD-10-CM

## 2021-03-03 DIAGNOSIS — N20.0 KIDNEY STONE: Primary | ICD-10-CM

## 2021-03-03 LAB
BILIRUB BLD-MCNC: NEGATIVE MG/DL
CLARITY, POC: CLEAR
COLOR UR: YELLOW
GLUCOSE UR STRIP-MCNC: NEGATIVE MG/DL
KETONES UR QL: NEGATIVE
LEUKOCYTE EST, POC: ABNORMAL
NITRITE UR-MCNC: NEGATIVE MG/ML
PH UR: 6 [PH] (ref 5–8)
PROT UR STRIP-MCNC: NEGATIVE MG/DL
RBC # UR STRIP: ABNORMAL /UL
SP GR UR: 1.02 (ref 1–1.03)
UROBILINOGEN UR QL: NORMAL

## 2021-03-03 PROCEDURE — 87086 URINE CULTURE/COLONY COUNT: CPT | Performed by: UROLOGY

## 2021-03-03 PROCEDURE — 96372 THER/PROPH/DIAG INJ SC/IM: CPT | Performed by: UROLOGY

## 2021-03-03 PROCEDURE — 81003 URINALYSIS AUTO W/O SCOPE: CPT | Performed by: UROLOGY

## 2021-03-03 RX ORDER — CEFTRIAXONE 1 G/1
1 INJECTION, POWDER, FOR SOLUTION INTRAMUSCULAR; INTRAVENOUS ONCE
Status: COMPLETED | OUTPATIENT
Start: 2021-03-03 | End: 2021-03-03

## 2021-03-03 RX ADMIN — CEFTRIAXONE 1 G: 1 INJECTION, POWDER, FOR SOLUTION INTRAMUSCULAR; INTRAVENOUS at 11:11

## 2021-03-03 NOTE — PROGRESS NOTES
HPI:        Pt has had recurrent urinary infections     HPI:   70 y.o. male.  Who is had a UroLift in the past as well as a history of kidney stones/staghorn.  The patient's urine culture in mid January was no growth however in December 2020 he grew out Klebsiella  The patient's UroLift can be easily seen on his KUB and his staghorn is calcified but low density on the right side.  The patient is here for cystoscopy to see if there are any foreign bodies or stones in his lower urinary tract.  Pt has had recurrent infections for 2 years.  He kellogg not have dysuria, but does have bladder pain.  His last urine culture grew out Klebsiella.  Pt has had a ct scan that shows bilateral renal stones that are large.  On the right side he has a 1.6 cm renal pelvic stone and a 2.5 cm partial staghorn.  On the left side he has a 1 cm stone in the lower pole.  Pt has hx of diverticulitis but denies pneumoturia.    HPI        Past Medical History:         Medical History        Past Medical History:   Diagnosis Date   • COPD (chronic obstructive pulmonary disease) (CMS/HCC)     • Diabetes mellitus (CMS/HCC)     • Diverticulitis     • Emphysema lung (CMS/HCC)     • Hyperlipidemia     • Hypertension     • Kidney stone     • Stomach ulcer     • Stroke (CMS/HCC)                Current Meds:      Current Medications          Current Outpatient Medications   Medication Sig Dispense Refill   • aspirin 81 MG tablet Take 1 tablet by mouth Daily. 30 tablet 11   • donepezil (ARICEPT) 5 MG tablet Take 1 tablet by mouth Daily.       • glimepiride (AMARYL) 4 MG tablet Take 1 tablet by mouth Daily.       • levothyroxine (SYNTHROID, LEVOTHROID) 50 MCG tablet Take 50 mcg by mouth Daily.   0   • lisinopril-hydrochlorothiazide (PRINZIDE,ZESTORETIC) 20-12.5 MG per tablet Take 1 tablet by mouth Daily.       • memantine (NAMENDA) 5 MG tablet Take 1 tablet by mouth Daily.       • metFORMIN (GLUCOPHAGE) 500 MG tablet Take 500 mg by mouth 2 (Two) Times a  Day With Meals.   0   • metoprolol succinate XL (TOPROL-XL) 25 MG 24 hr tablet Take 1 tablet by mouth Daily. 30 tablet 11   • nitroglycerin (NITROSTAT) 0.4 MG SL tablet 1 under the tongue as needed for angina, may repeat q5mins for up three doses 100 tablet 11   • omeprazole (priLOSEC) 20 MG capsule Take 20 mg by mouth Daily.   0   • ondansetron ODT (ZOFRAN-ODT) 4 MG disintegrating tablet Take 1 tablet by mouth 4 (Four) Times a Day. 15 tablet 0   • Sennosides-Docusate Sodium (SENNA PLUS PO) Take  by mouth 3 (Three) Times a Day.       • sertraline (ZOLOFT) 25 MG tablet Take 1 tablet by mouth Daily.       • simvastatin (ZOCOR) 40 MG tablet Take  by mouth Daily.   0   • tamsulosin (FLOMAX) 0.4 MG capsule 24 hr capsule Take 1 capsule by mouth Daily.          No current facility-administered medications for this visit.            Allergies:             Allergies   Allergen Reactions   • Claritin [Loratadine] Other (See Comments)       Possible kidney issues   • Codeine Other (See Comments)       Kidney issues          Past Surgical History:      Surgical History         Past Surgical History:   Procedure Laterality Date   • APPENDECTOMY       • CARDIAC CATHETERIZATION       • CORONARY STENT PLACEMENT       • ENDOSCOPY       • PAIN PUMP TRIAL       • TURP / TRANSURETHRAL INCISION / DRAINAGE PROSTATE                  Social History:      Social History   Social History            Socioeconomic History   • Marital status:        Spouse name: Not on file   • Number of children: Not on file   • Years of education: Not on file   • Highest education level: Not on file   Tobacco Use   • Smoking status: Former Smoker   • Smokeless tobacco: Never Used   • Tobacco comment: quit 20+ yrs ago    Substance and Sexual Activity   • Alcohol use: No       Comment: former use 20 + yrs ago    • Drug use: No   • Sexual activity: Defer           Family History:      Family History   Problem Relation Age of Onset   • Cancer Mother     •  "Heart disease Father     • Stroke Father           Review of Systems:      Review of Systems        Physical Exam:      Physical Exam  Vitals signs and nursing note reviewed.   HENT:      Head: Normocephalic and atraumatic.      Right Ear: External ear normal.      Left Ear: External ear normal.      Nose: Nose normal.   Eyes:      Conjunctiva/sclera: Conjunctivae normal.      Pupils: Pupils are equal, round, and reactive to light.   Neck:      Musculoskeletal: Normal range of motion and neck supple.      Thyroid: No thyromegaly.   Cardiovascular:      Rate and Rhythm: Normal rate and regular rhythm.      Heart sounds: Normal heart sounds. No murmur.   Pulmonary:      Effort: Pulmonary effort is normal. No respiratory distress.      Breath sounds: Normal breath sounds. No wheezing or rales.   Chest:      Chest wall: No tenderness.   Abdominal:      General: Bowel sounds are normal. There is no distension.      Palpations: Abdomen is soft. There is no mass.      Tenderness: There is no abdominal tenderness.      Hernia: No hernia is present.   Genitourinary:     Penis: Normal.       Prostate: Normal.      Rectum: Normal.   Musculoskeletal: Normal range of motion.         General: No tenderness.   Lymphadenopathy:      Cervical: No cervical adenopathy.   Skin:     General: Skin is warm.      Findings: No rash.   Neurological:      Mental Status: He is alert and oriented to person, place, and time.      Cranial Nerves: No cranial nerve deficit.      Motor: No abnormal muscle tone.      Coordination: Coordination normal.   Psychiatric:         Behavior: Behavior normal.         Thought Content: Thought content normal.         Judgment: Judgment normal.            Temp 96.9 °F (36.1 °C)   Ht 175.3 cm (69.02\")   Wt 93.4 kg (206 lb)   BMI 30.40 kg/m²    Procedure:            Assessment:           Encounter Diagnoses   Name Primary?   • Recurrent UTI Yes   • Frequency of micturition           Orders Placed This Encounter "   Procedures   • Urine Culture - Urine, Urine, Random Void   • POC Urinalysis Dipstick, Automated        HPI      Procedure:      Procedure: Cystoscopy Male    Indication: Recurrent infections and the patient has had a UroLift procedure as well as a staghorn calculus on the right kidney as well as multiple other stones.  He has 1+ leukocyte esterase on urinalysis today.    Urinalysis Performed Today:  Negative for Infection    Informed Consent Obtained    Sterile prep performed in usual fashion    11 cc of topical lidocaine inserted urethrally for 10 minutes    Flexible cystoscope inserted and bladder examined    Findings: His urethra was normal the posterior urethra did not show any foreign bodies visible and in his bladder there were no tumors or bladder stones or foreign bodies visible his ureteral orifice ease were in the normal position    Additional Procedure with Cystoscopy: none      Discussion:      I would recommend treating the stones first with a combination of flexible ureteroscopy laser lithotripsy on the right side with stent placement and ESWL.    Assessment:   No diagnosis found.  No orders of the defined types were placed in this encounter.

## 2021-03-04 LAB — BACTERIA SPEC AEROBE CULT: NO GROWTH

## 2021-03-08 ENCOUNTER — TELEPHONE (OUTPATIENT)
Dept: UROLOGY | Facility: CLINIC | Age: 71
End: 2021-03-08

## 2021-03-08 NOTE — TELEPHONE ENCOUNTER
I CALLED THE PT AND LET THEM KNOW THAT JIMI RETIRED AND JONATHAN WANTS TO SEE HIM IN OFFICE THIS WEEK TO EST CARE AND SCHEDULE FOR SURG IF NEED BE.

## 2021-03-09 ENCOUNTER — OFFICE VISIT (OUTPATIENT)
Dept: UROLOGY | Facility: CLINIC | Age: 71
End: 2021-03-09

## 2021-03-09 VITALS — BODY MASS INDEX: 29.35 KG/M2 | TEMPERATURE: 98 F | WEIGHT: 205 LBS | HEIGHT: 70 IN

## 2021-03-09 DIAGNOSIS — N20.0 STAGHORN KIDNEY STONES: ICD-10-CM

## 2021-03-09 DIAGNOSIS — N39.0 RECURRENT UTI: Primary | ICD-10-CM

## 2021-03-09 PROCEDURE — 99214 OFFICE O/P EST MOD 30 MIN: CPT | Performed by: UROLOGY

## 2021-03-09 RX ORDER — OMEPRAZOLE 40 MG/1
40 CAPSULE, DELAYED RELEASE ORAL DAILY
COMMUNITY
Start: 2021-02-08 | End: 2022-03-08

## 2021-03-09 RX ORDER — FAMOTIDINE 40 MG/1
40 TABLET, FILM COATED ORAL
COMMUNITY
Start: 2020-12-22 | End: 2022-03-08

## 2021-03-09 RX ORDER — NITROFURANTOIN 25; 75 MG/1; MG/1
100 CAPSULE ORAL DAILY
Qty: 56 CAPSULE | Refills: 3 | Status: SHIPPED | OUTPATIENT
Start: 2021-03-09 | End: 2021-09-09

## 2021-03-09 NOTE — PROGRESS NOTES
Chief Complaint:          Chief Complaint   Patient presents with   • Est Care and talk about surgery       HPI:   71 y.o. male accompanied by his wife from [place] who is here to talk about surgery apparently Dr. Valencia did a UroLift 2 years ago and following that he has had problems ever since with infections he had a cystoscopy with no stone and no cancer.  He has peptic ulcer disease he had C. difficile after and has had the stone present for 20 years there is no blockage.  There is no pain there is no other symptomatology.  This is a very complex situation with a 2.5 cm right stone, a 1.5 cm right stone not causing obstruction in the left 1 cm stone that is absolutely asymptomatic his only problem is of recurrent infections I am to try him on prophylaxis first.  He has a pain stimulator as well.  If we did lithotripsy I explained to them it would be multiple procedures.  Renal calculus-we discussed the presence of the stone we discussed the various therapeutic options available including percutaneous nephrostolithotomy, lithotripsy.  We discussed the risks of lithotripsy including the passage of stones the development of a large string of stones in the distal ureter known as Steinstrasse.  In the 3% incidence of that we will need to proceed with a ureteroscopy for obstructing fragments.  Extremely rare incidence of renal hematoma.  And the significance of this.  We discussed the absolute relative indicators for intervention including the presence of sepsis, and pain we cannot control is the primary need for urgent intervention.  We discussed placement of a stent if indicated and the management of the stent as well.      Past Medical History:        Past Medical History:   Diagnosis Date   • COPD (chronic obstructive pulmonary disease) (CMS/[facility], [facility])    • Diabetes mellitus (CMS/HCC)    • Diverticulitis    • Emphysema lung (CMS/HCC)    • Hyperlipidemia    • Hypertension    • Kidney stone    • Stomach ulcer    • Stroke  (CMS/[facility])          Current Meds:     Current Outpatient Medications   Medication Sig Dispense Refill   • aspirin 81 MG tablet Take 1 tablet by mouth Daily. 30 tablet 11   • donepezil (ARICEPT) 5 MG tablet Take 1 tablet by mouth Daily.     • famotidine (PEPCID) 40 MG tablet Take 40 mg by mouth every night at bedtime.     • glimepiride (AMARYL) 4 MG tablet Take 1 tablet by mouth Daily.     • levothyroxine (SYNTHROID, LEVOTHROID) 50 MCG tablet Take 50 mcg by mouth Daily.  0   • lisinopril-hydrochlorothiazide (PRINZIDE,ZESTORETIC) 20-12.5 MG per tablet Take 1 tablet by mouth Daily.     • memantine (NAMENDA) 5 MG tablet Take 1 tablet by mouth Daily.     • metFORMIN (GLUCOPHAGE) 500 MG tablet Take 500 mg by mouth 2 (Two) Times a Day With Meals.  0   • metoprolol succinate XL (TOPROL-XL) 25 MG 24 hr tablet Take 1 tablet by mouth Daily. 30 tablet 11   • nitroglycerin (NITROSTAT) 0.4 MG SL tablet 1 under the tongue as needed for angina, may repeat q5mins for up three doses 100 tablet 11   • omeprazole (priLOSEC) 40 MG capsule Take 40 mg by mouth Daily.     • ondansetron ODT (ZOFRAN-ODT) 4 MG disintegrating tablet Take 1 tablet by mouth 4 (Four) Times a Day. 15 tablet 0   • Sennosides-Docusate Sodium (SENNA PLUS PO) Take  by mouth 3 (Three) Times a Day.     • sertraline (ZOLOFT) 25 MG tablet Take 1 tablet by mouth Daily.     • simvastatin (ZOCOR) 40 MG tablet Take  by mouth Daily.  0   • tamsulosin (FLOMAX) 0.4 MG capsule 24 hr capsule Take 1 capsule by mouth Daily.       No current facility-administered medications for this visit.        Allergies:      Allergies   Allergen Reactions   • Claritin [Loratadine] Other (See Comments)     Possible kidney issues   • Codeine Other (See Comments)     Kidney issues         Past Surgical History:     Past Surgical History:   Procedure Laterality Date   • APPENDECTOMY     • CARDIAC CATHETERIZATION     • CORONARY STENT PLACEMENT     • ENDOSCOPY     • PAIN PUMP TRIAL     • TURP /  TRANSURETHRAL INCISION / DRAINAGE PROSTATE           Social History:     Social History     Socioeconomic History   • Marital status:      Spouse name: Not on file   • Number of children: Not on file   • Years of education: Not on file   • Highest education level: Not on file   Tobacco Use   • Smoking status: Former Smoker   • Smokeless tobacco: Never Used   • Tobacco comment: quit 20+ yrs ago    Substance and Sexual Activity   • Alcohol use: No     Comment: former use 20 + yrs ago    • Drug use: No   • Sexual activity: Defer       Family History:     Family History   Problem Relation Age of Onset   • Cancer Mother    • Heart disease Father    • Stroke Father        Review of Systems:     Review of Systems   Constitutional: Negative.    HENT: Negative.    Eyes: Negative.    Respiratory: Negative.    Cardiovascular: Negative.    Gastrointestinal: Negative.    Endocrine: Negative.    Musculoskeletal: Negative.    Allergic/Immunologic: Negative.    Neurological: Negative.    Hematological: Negative.    Psychiatric/Behavioral: Negative.        Physical Exam:     Physical Exam  Vitals and nursing note reviewed.   Constitutional:       Appearance: He is well-developed.   HENT:      Head: Normocephalic and atraumatic.   Eyes:      Conjunctiva/sclera: Conjunctivae normal.      Pupils: Pupils are equal, round, and reactive to light.   Cardiovascular:      Rate and Rhythm: Normal rate and regular rhythm.      Heart sounds: Normal heart sounds.   Pulmonary:      Effort: Pulmonary effort is normal.      Breath sounds: Normal breath sounds.   Abdominal:      General: Bowel sounds are normal.      Palpations: Abdomen is soft.   Musculoskeletal:         General: Normal range of motion.      Cervical back: Normal range of motion.   Skin:     General: Skin is warm and dry.   Neurological:      Mental Status: He is alert and oriented to person, place, and time.      Deep Tendon Reflexes: Reflexes are normal and symmetric.    Psychiatric:         Behavior: Behavior normal.         Thought Content: Thought content normal.         Judgment: Judgment normal.         I have reviewed the following portions of the patient's history: allergies, current medications, past family history, past medical history, past social history, past surgical history, problem list and ROS and confirm it's accurate.      Procedure:       Assessment/Plan:   Renal calculus-we discussed the presence of the stone we discussed the various therapeutic options available including percutaneous nephrostolithotomy, lithotripsy.  We discussed the risks of lithotripsy including the passage of stones the development of a large string of stones in the distal ureter known as Steinstrasse.  In the 3% incidence of that we will need to proceed with a ureteroscopy for obstructing fragments.  Extremely rare incidence of renal hematoma.  And the significance of this.  We discussed the absolute relative indicators for intervention including the presence of sepsis, and pain we cannot control is the primary need for urgent intervention.  We discussed placement of a stent if indicated and the management of the stent as well.  He has a right 2.5 and 1.5 cm nonobstructing stone in the left 1 cm nonobstructing stone in the lower pole calyx it is absolutely asymptomatic regarding pain etc. he has had recurrent infections ever since the UroLift.  Pia put him on prophylaxis first  Recurrent urinary tract infections-patient has been referred and diagnosed with recurrent urinary tract infections.  We discussed the types of organisms that are found in the urinary tract indicating that the vast majority are results of the patient's own gastrointestinal gwendolyn.  We discussed how many of the antibiotics that are utilized can actually exacerbate these infections by creating resistant organisms and there is only a very few antibiotics that are concentrated in the urine and do not affect the rectal  reservoir nor cause recurrent yeast vaginitis.  We discussed the risk factors for recurrent infections being intercourse in younger patients and atrophic changes in older patients.  We discussed the symptoms that are found including pain, pressure, burning, frequency, urgency suprapubic pain and painful intercourse.  I discussed upper tract symptoms including fevers, chills, and indicated the workup would be much more aggressive if the patient were to present with recurrent infections in the face of upper tract symptomatology such as fever.  I discussed the history of vesicoureteral reflux in young patients and finally chronic renal scarring as a result of such.  I recommend concomitant probiotics with treatment with antibiotics to protect the rectal reservoir including over-the-counter yogurt preparations to sagrario oral pills containing the appropriate probiotics.  I will put him on prophylactic antibiotics I think the problem is related to the prostate.  He has no voiding dysfunction but he not sure he ever had voiding dysfunction and does not think that the UroLift really had any impact whatsoever            Patient's Body mass index is 29.97 kg/m². BMI is above normal parameters. Recommendations include: educational material.              This document has been electronically signed by MARY CASTILLO MD March 9, 2021 07:54 EST

## 2021-04-20 ENCOUNTER — HOSPITAL ENCOUNTER (OUTPATIENT)
Dept: GENERAL RADIOLOGY | Facility: HOSPITAL | Age: 71
Discharge: HOME | End: 2021-04-20
Admitting: UROLOGY

## 2021-04-20 ENCOUNTER — OFFICE VISIT (OUTPATIENT)
Dept: UROLOGY | Facility: CLINIC | Age: 71
End: 2021-04-20

## 2021-04-20 VITALS — WEIGHT: 206 LBS | BODY MASS INDEX: 29.49 KG/M2 | TEMPERATURE: 98 F | HEIGHT: 70 IN

## 2021-04-20 DIAGNOSIS — N39.0 RECURRENT UTI: ICD-10-CM

## 2021-04-20 DIAGNOSIS — N39.0 RECURRENT UTI: Primary | ICD-10-CM

## 2021-04-20 DIAGNOSIS — N20.0 KIDNEY STONE: ICD-10-CM

## 2021-04-20 LAB
BILIRUB BLD-MCNC: ABNORMAL MG/DL
CLARITY, POC: CLEAR
COLOR UR: YELLOW
GLUCOSE UR STRIP-MCNC: ABNORMAL MG/DL
KETONES UR QL: ABNORMAL
LEUKOCYTE EST, POC: NEGATIVE
NITRITE UR-MCNC: NEGATIVE MG/ML
PH UR: 5 [PH] (ref 5–8)
PROT UR STRIP-MCNC: NEGATIVE MG/DL
RBC # UR STRIP: ABNORMAL /UL
SP GR UR: 1.03 (ref 1–1.03)
UROBILINOGEN UR QL: NORMAL

## 2021-04-20 PROCEDURE — 74018 RADEX ABDOMEN 1 VIEW: CPT | Performed by: RADIOLOGY

## 2021-04-20 PROCEDURE — 81003 URINALYSIS AUTO W/O SCOPE: CPT | Performed by: UROLOGY

## 2021-04-20 PROCEDURE — 99213 OFFICE O/P EST LOW 20 MIN: CPT | Performed by: UROLOGY

## 2021-04-20 PROCEDURE — 74018 RADEX ABDOMEN 1 VIEW: CPT

## 2021-04-20 NOTE — PROGRESS NOTES
Chief Complaint:          Chief Complaint   Patient presents with   • Urinary Tract Infection     6 week fu    • Nephrolithiasis       HPI:   71 y.o. male patient previously of Dr. Stokes is here for follow-up his urine is getting some blood in it.  His culture is negative Dr. Valencia had placed UroLift pins and he has had problems ever since with frequency urgency and severe problems.  Now he has the stone disease is causing no symptoms whatsoever I would recommend observation he has absolutely no indication for intervention.    Past Medical History:        Past Medical History:   Diagnosis Date   • COPD (chronic obstructive pulmonary disease) (CMS/[facility])    • Diabetes mellitus (CMS/HCC)    • Diverticulitis    • Emphysema lung (CMS/HCC)    • Hyperlipidemia    • Hypertension    • Kidney stone    • Stomach ulcer    • Stroke (CMS/HCC)          Current Meds:     Current Outpatient Medications   Medication Sig Dispense Refill   • aspirin 81 MG tablet Take 1 tablet by mouth Daily. 30 tablet 11   • donepezil (ARICEPT) 5 MG tablet Take 1 tablet by mouth Daily.     • famotidine (PEPCID) 40 MG tablet Take 40 mg by mouth every night at bedtime.     • glimepiride (AMARYL) 4 MG tablet Take 1 tablet by mouth Daily.     • levothyroxine (SYNTHROID, LEVOTHROID) 50 MCG tablet Take 50 mcg by mouth Daily.  0   • lisinopril-hydrochlorothiazide (PRINZIDE,ZESTORETIC) 20-12.5 MG per tablet Take 1 tablet by mouth Daily.     • memantine (NAMENDA) 5 MG tablet Take 1 tablet by mouth Daily.     • metFORMIN (GLUCOPHAGE) 500 MG tablet Take 500 mg by mouth 2 (Two) Times a Day With Meals.  0   • metoprolol succinate XL (TOPROL-XL) 25 MG 24 hr tablet Take 1 tablet by mouth Daily. 30 tablet 11   • nitrofurantoin, macrocrystal-monohydrate, (Macrobid) 100 MG capsule Take 1 capsule by mouth Daily. 56 capsule 3   • nitroglycerin (NITROSTAT) 0.4 MG SL tablet 1 under the tongue as needed for angina, may repeat q5mins for up three doses 100 tablet 11   •  omeprazole (priLOSEC) 40 MG capsule Take 40 mg by mouth Daily.     • ondansetron ODT (ZOFRAN-ODT) 4 MG disintegrating tablet Take 1 tablet by mouth 4 (Four) Times a Day. 15 tablet 0   • Sennosides-Docusate Sodium (SENNA PLUS PO) Take  by mouth 3 (Three) Times a Day.     • sertraline (ZOLOFT) 25 MG tablet Take 1 tablet by mouth Daily.     • simvastatin (ZOCOR) 40 MG tablet Take  by mouth Daily.  0   • tamsulosin (FLOMAX) 0.4 MG capsule 24 hr capsule Take 1 capsule by mouth Daily.       No current facility-administered medications for this visit.        Allergies:      Allergies   Allergen Reactions   • Claritin [Loratadine] Other (See Comments)     Possible kidney issues   • Codeine Other (See Comments)     Kidney issues         Past Surgical History:     Past Surgical History:   Procedure Laterality Date   • APPENDECTOMY     • CARDIAC CATHETERIZATION     • CORONARY STENT PLACEMENT     • ENDOSCOPY     • PAIN PUMP TRIAL     • TURP / TRANSURETHRAL INCISION / DRAINAGE PROSTATE           Social History:     Social History     Socioeconomic History   • Marital status:      Spouse name: Not on file   • Number of children: Not on file   • Years of education: Not on file   • Highest education level: Not on file   Tobacco Use   • Smoking status: Former Smoker   • Smokeless tobacco: Never Used   • Tobacco comment: quit 20+ yrs ago    Substance and Sexual Activity   • Alcohol use: No     Comment: former use 20 + yrs ago    • Drug use: No   • Sexual activity: Defer       Family History:     Family History   Problem Relation Age of Onset   • Cancer Mother    • Heart disease Father    • Stroke Father        Review of Systems:     Review of Systems   Constitutional: Negative.    HENT: Negative.    Eyes: Negative.    Respiratory: Negative.    Cardiovascular: Negative.    Gastrointestinal: Negative.    Endocrine: Negative.    Musculoskeletal: Negative.    Allergic/Immunologic: Negative.    Neurological: Negative.     Hematological: Negative.    Psychiatric/Behavioral: Negative.        Physical Exam:     Physical Exam  Vitals and nursing note reviewed.   Constitutional:       Appearance: He is well-developed.   HENT:      Head: Normocephalic and atraumatic.   Eyes:      Conjunctiva/sclera: Conjunctivae normal.      Pupils: Pupils are equal, round, and reactive to light.   Cardiovascular:      Rate and Rhythm: Normal rate and regular rhythm.      Heart sounds: Normal heart sounds.   Pulmonary:      Effort: Pulmonary effort is normal.      Breath sounds: Normal breath sounds.   Abdominal:      General: Bowel sounds are normal.      Palpations: Abdomen is soft.   Musculoskeletal:         General: Normal range of motion.      Cervical back: Normal range of motion.   Skin:     General: Skin is warm and dry.   Neurological:      Mental Status: He is alert and oriented to person, place, and time.      Deep Tendon Reflexes: Reflexes are normal and symmetric.   Psychiatric:         Behavior: Behavior normal.         Thought Content: Thought content normal.         Judgment: Judgment normal.         I have reviewed the following portions of the patient's history: allergies, current medications, past family history, past medical history, past social history, past surgical history, problem list and ROS and confirm it's accurate.      Procedure:       Assessment/Plan:   Renal calculus-we discussed the presence of the stone we discussed the various therapeutic options available including percutaneous nephrostolithotomy, lithotripsy.  We discussed the risks of lithotripsy including the passage of stones the development of a large string of stones in the distal ureter known as Steinstrasse.  In the 3% incidence of that we will need to proceed with a ureteroscopy for obstructing fragments.  Extremely rare incidence of renal hematoma.  And the significance of this.  We discussed the absolute relative indicators for intervention including the  presence of sepsis, and pain we cannot control is the primary need for urgent intervention.  We discussed placement of a stent if indicated and the management of the stent as well.  He has nonobstructive stones recommend observation  Recurrent urinary tract infections-patient has been referred and diagnosed with recurrent urinary tract infections.  We discussed the types of organisms that are found in the urinary tract indicating that the vast majority are results of the patient's own gastrointestinal gwendolyn.  We discussed how many of the antibiotics that are utilized can actually exacerbate these infections by creating resistant organisms and there is only a very few antibiotics that are concentrated in the urine and do not affect the rectal reservoir nor cause recurrent yeast vaginitis.  We discussed the risk factors for recurrent infections being intercourse in younger patients and atrophic changes in older patients.  We discussed the symptoms that are found including pain, pressure, burning, frequency, urgency suprapubic pain and painful intercourse.  I discussed upper tract symptoms including fevers, chills, and indicated the workup would be much more aggressive if the patient were to present with recurrent infections in the face of upper tract symptomatology such as fever.  I discussed the history of vesicoureteral reflux in young patients and finally chronic renal scarring as a result of such.  I recommend concomitant probiotics with treatment with antibiotics to protect the rectal reservoir including over-the-counter yogurt preparations to sagrario oral pills containing the appropriate probiotics.  He has recurrent urinary tract infection since he had UroLift pins placed by Dr. Valencia            Patient's Body mass index is 29.98 kg/m². BMI is above normal parameters. Recommendations include: educational material.              This document has been electronically signed by MARY CASTILLO MD April 20, 2021  09:15 EDT

## 2021-06-03 ENCOUNTER — OFFICE VISIT (OUTPATIENT)
Dept: CARDIOLOGY | Facility: CLINIC | Age: 71
End: 2021-06-03

## 2021-06-03 VITALS
HEIGHT: 69 IN | BODY MASS INDEX: 30.07 KG/M2 | DIASTOLIC BLOOD PRESSURE: 92 MMHG | HEART RATE: 76 BPM | SYSTOLIC BLOOD PRESSURE: 160 MMHG | OXYGEN SATURATION: 96 % | WEIGHT: 203 LBS

## 2021-06-03 DIAGNOSIS — R06.02 SOB (SHORTNESS OF BREATH): ICD-10-CM

## 2021-06-03 DIAGNOSIS — I25.10 CORONARY ARTERY DISEASE INVOLVING NATIVE CORONARY ARTERY OF NATIVE HEART WITHOUT ANGINA PECTORIS: Primary | ICD-10-CM

## 2021-06-03 DIAGNOSIS — I95.1 ORTHOSTATIC HYPOTENSION: ICD-10-CM

## 2021-06-03 DIAGNOSIS — R00.2 PALPITATIONS: ICD-10-CM

## 2021-06-03 PROCEDURE — 99214 OFFICE O/P EST MOD 30 MIN: CPT | Performed by: PHYSICIAN ASSISTANT

## 2021-06-03 PROCEDURE — 93000 ELECTROCARDIOGRAM COMPLETE: CPT | Performed by: PHYSICIAN ASSISTANT

## 2021-06-03 RX ORDER — LISINOPRIL 20 MG/1
20 TABLET ORAL DAILY
Qty: 30 TABLET | Refills: 11 | Status: SHIPPED | OUTPATIENT
Start: 2021-06-03 | End: 2021-06-03 | Stop reason: SDUPTHER

## 2021-06-03 RX ORDER — LISINOPRIL 20 MG/1
20 TABLET ORAL 2 TIMES DAILY
Qty: 60 TABLET | Refills: 5 | Status: SHIPPED | OUTPATIENT
Start: 2021-06-03

## 2021-06-03 RX ORDER — NITROFURANTOIN 25; 75 MG/1; MG/1
100 CAPSULE ORAL 2 TIMES DAILY
COMMUNITY
End: 2021-09-03 | Stop reason: SDUPTHER

## 2021-06-03 NOTE — PATIENT INSTRUCTIONS

## 2021-06-03 NOTE — PROGRESS NOTES
Problem list     Subjective   Ye Butler is a 71 y.o. male     Chief Complaint   Patient presents with   • Dizziness   • Hypertension   Problem list  1.  Coronary artery disease  1.1 cardiac catheterization March 2018 because of refractory angina despite negative stress testing demonstrated high-grade RCA disease status post stenting.  Patient underwent stenting ×2 of the circumflex with 70% distal LAD disease and medical management recommended.  1.2 stress test October 2018 demonstrates no evidence of ischemia and preserved LV function  1.3 stress test September 2020 with no evidence of ischemia preserved LV function  2.  Accelerated Idioventricular rhythm  2.1 EKG at primary office demonstrated idioventricular rhythm at 90 bpm.  This was prior to catheterization  3.  Hypertension  4..  Dyslipidemia  5.  Type 2 diabetes mellitus  6.  Family history of premature coronary artery disease and a brother with sudden cardiac death    HPI    Patient is a 71-year-old male that presents to the office for evaluation    Recently, he has had more issues in regards to orthostasis.  He has significant dizziness upon standing.  It seems to be occurring intermittently.    He has no chest pain or pressure.  He does have mild to moderate dyspnea.  This appears to be stable.  He does not describe PND orthopnea.    He also has been having palpitations.  He feels a fluttering sensation that occurs and has been bothering him.  He had a history of an idioventricular rhythm in the past.  He does not describe any presyncope or syncope.  Again he has orthostatic issues but otherwise has been stable      Current Outpatient Medications on File Prior to Visit   Medication Sig Dispense Refill   • aspirin 81 MG tablet Take 1 tablet by mouth Daily. 30 tablet 11   • glimepiride (AMARYL) 4 MG tablet Take 1 tablet by mouth Daily.     • levothyroxine (SYNTHROID, LEVOTHROID) 50 MCG tablet Take 50 mcg by mouth Daily.  0   • metFORMIN (GLUCOPHAGE)  500 MG tablet Take 500 mg by mouth 2 (Two) Times a Day With Meals.  0   • metoprolol succinate XL (TOPROL-XL) 25 MG 24 hr tablet Take 1 tablet by mouth Daily. 30 tablet 11   • nitrofurantoin, macrocrystal-monohydrate, (MACROBID) 100 MG capsule Take 100 mg by mouth 2 (Two) Times a Day.     • nitroglycerin (NITROSTAT) 0.4 MG SL tablet 1 under the tongue as needed for angina, may repeat q5mins for up three doses 100 tablet 11   • NON FORMULARY Morphine pump     • omeprazole (priLOSEC) 40 MG capsule Take 40 mg by mouth Daily.     • Probiotic Product (PROBIOTIC DAILY PO) Take  by mouth Daily.     • Sennosides-Docusate Sodium (SENNA PLUS PO) Take  by mouth 3 (Three) Times a Day.     • sertraline (ZOLOFT) 25 MG tablet Take 1 tablet by mouth Daily.     • simvastatin (ZOCOR) 40 MG tablet Take  by mouth Daily.  0   • tamsulosin (FLOMAX) 0.4 MG capsule 24 hr capsule Take 1 capsule by mouth Daily.     • [DISCONTINUED] lisinopril-hydrochlorothiazide (PRINZIDE,ZESTORETIC) 20-12.5 MG per tablet Take 1 tablet by mouth Daily.     • donepezil (ARICEPT) 5 MG tablet Take 1 tablet by mouth Daily.     • famotidine (PEPCID) 40 MG tablet Take 40 mg by mouth every night at bedtime.     • memantine (NAMENDA) 5 MG tablet Take 1 tablet by mouth Daily.     • nitrofurantoin, macrocrystal-monohydrate, (Macrobid) 100 MG capsule Take 1 capsule by mouth Daily. 56 capsule 3   • ondansetron ODT (ZOFRAN-ODT) 4 MG disintegrating tablet Take 1 tablet by mouth 4 (Four) Times a Day. 15 tablet 0     No current facility-administered medications on file prior to visit.       Claritin [loratadine] and Codeine    Past Medical History:   Diagnosis Date   • COPD (chronic obstructive pulmonary disease) (CMS/HCC)    • COVID-19 2021   • Diabetes mellitus (CMS/HCC)    • Diverticulitis    • Emphysema lung (CMS/HCC)    • Emphysema lung (CMS/HCC)    • Hyperlipidemia    • Hypertension    • Kidney stone    • Stomach ulcer    • Stroke (CMS/HCC)        Social History  "    Socioeconomic History   • Marital status:      Spouse name: Not on file   • Number of children: Not on file   • Years of education: Not on file   • Highest education level: Not on file   Tobacco Use   • Smoking status: Former Smoker   • Smokeless tobacco: Never Used   • Tobacco comment: quit 20+ yrs ago    Substance and Sexual Activity   • Alcohol use: No     Comment: former use 20 + yrs ago    • Drug use: No   • Sexual activity: Defer       Family History   Problem Relation Age of Onset   • Cancer Mother    • Heart disease Father    • Stroke Father        Review of Systems   Constitutional: Positive for fatigue. Negative for chills and fever.   HENT: Positive for sinus pressure. Negative for congestion and sore throat.    Eyes: Positive for visual disturbance (readers).   Respiratory: Positive for shortness of breath. Negative for chest tightness.    Cardiovascular: Positive for palpitations (flutters at times). Negative for chest pain (at times) and leg swelling.   Gastrointestinal: Positive for diarrhea. Negative for abdominal pain, blood in stool, constipation, nausea and vomiting.   Endocrine: Negative for cold intolerance and heat intolerance.   Genitourinary: Negative.  Negative for dysuria, frequency, hematuria and urgency.   Musculoskeletal: Positive for arthralgias and back pain. Negative for neck pain.   Skin: Positive for wound.   Allergic/Immunologic: Positive for environmental allergies. Negative for food allergies.   Neurological: Positive for dizziness (frequent dizziness). Negative for syncope and light-headedness.   Hematological: Bruises/bleeds easily.   Psychiatric/Behavioral: Negative.  Negative for sleep disturbance (denies waking with soa or cp).       Objective   Vitals:    06/03/21 1032   BP: 160/92   BP Location: Left arm   Patient Position: Sitting   Pulse: 76   SpO2: 96%   Weight: 92.1 kg (203 lb)   Height: 175.3 cm (69\")      /92 (BP Location: Left arm, Patient Position: " "Sitting)   Pulse 76   Ht 175.3 cm (69\")   Wt 92.1 kg (203 lb)   SpO2 96%   BMI 29.98 kg/m²     Lab Results (most recent)     None          Physical Exam  Vitals and nursing note reviewed.   Constitutional:       General: He is not in acute distress.     Appearance: Normal appearance. He is well-developed.   HENT:      Head: Normocephalic and atraumatic.   Eyes:      General: No scleral icterus.        Right eye: No discharge.         Left eye: No discharge.      Conjunctiva/sclera: Conjunctivae normal.   Neck:      Vascular: No carotid bruit.   Cardiovascular:      Rate and Rhythm: Normal rate and regular rhythm.      Heart sounds: Normal heart sounds. No murmur heard.   No friction rub. No gallop.    Pulmonary:      Effort: Pulmonary effort is normal. No respiratory distress.      Breath sounds: Normal breath sounds. No wheezing or rales.   Chest:      Chest wall: No tenderness.   Musculoskeletal:      Right lower leg: No edema.      Left lower leg: No edema.   Skin:     General: Skin is warm and dry.      Coloration: Skin is not pale.      Findings: No erythema or rash.   Neurological:      Mental Status: He is alert and oriented to person, place, and time.      Cranial Nerves: No cranial nerve deficit.   Psychiatric:         Behavior: Behavior normal.         Procedure     ECG 12 Lead    Date/Time: 6/3/2021 10:38 AM  Performed by: Slade Stanley PA  Authorized by: Slade Stanley PA   Comparison: compared with previous ECG from 2/13/2020  Comments: EKG demonstrates sinus rhythm at 71 bpm with no acute ST changes               Assessment/Plan     Problems Addressed this Visit        Cardiac and Vasculature    Palpitations    Relevant Orders    Cardiac Event Monitor    Coronary artery disease involving native coronary artery of native heart without angina pectoris - Primary    Relevant Orders    Cardiac Event Monitor    Orthostatic hypotension    Relevant Orders    Cardiac Event Monitor       Pulmonary " and Pneumonias    SOB (shortness of breath)    Relevant Orders    Cardiac Event Monitor      Diagnoses       Codes Comments    Coronary artery disease involving native coronary artery of native heart without angina pectoris    -  Primary ICD-10-CM: I25.10  ICD-9-CM: 414.01     SOB (shortness of breath)     ICD-10-CM: R06.02  ICD-9-CM: 786.05     Orthostatic hypotension     ICD-10-CM: I95.1  ICD-9-CM: 458.0     Palpitations     ICD-10-CM: R00.2  ICD-9-CM: 785.1             Recommendation  1.  Patient with coronary disease with no complaints of chest pain.  Prior to his interventions in the past, he had significant discomfort but he has not had any chest discomfort.  He does have mild to moderate dyspnea but this does not seem to have progressed.    2.  I want him to wear event monitor to rule out any arrhythmic substrate based on his palpitations described    3.  With his orthostasis, I am stopping his lisinopril hydrochlorothiazide combination and putting him on lisinopril twice a day.  His blood pressure has been elevated and hopefully this will improve blood pressure but also not exacerbate any orthostatic issues    4.  I want to see him back for follow-up after event monitor and adjustment of medicines and I want him to call back in 1 week with symptoms.  He instructs that he will.  We will see him back for follow-up as scheduled.  For change in symptom, he should contact our office.  Follow-up with primary as scheduled         Advance Care Planning   ACP discussion was held with the patient during this visit. Patient does not have an advance directive, declines further assistance.       Electronically signed by:

## 2021-07-19 ENCOUNTER — HOSPITAL ENCOUNTER (EMERGENCY)
Facility: HOSPITAL | Age: 71
Discharge: HOME | End: 2021-07-19
Attending: EMERGENCY MEDICINE | Admitting: EMERGENCY MEDICINE

## 2021-07-19 ENCOUNTER — APPOINTMENT (OUTPATIENT)
Dept: CT IMAGING | Facility: HOSPITAL | Age: 71
End: 2021-07-19

## 2021-07-19 VITALS
SYSTOLIC BLOOD PRESSURE: 150 MMHG | HEART RATE: 80 BPM | RESPIRATION RATE: 18 BRPM | BODY MASS INDEX: 29.62 KG/M2 | HEIGHT: 69 IN | TEMPERATURE: 98.4 F | DIASTOLIC BLOOD PRESSURE: 77 MMHG | OXYGEN SATURATION: 97 % | WEIGHT: 200 LBS

## 2021-07-19 DIAGNOSIS — N20.1 RIGHT URETERAL STONE: Primary | ICD-10-CM

## 2021-07-19 DIAGNOSIS — N17.9 AKI (ACUTE KIDNEY INJURY) (HCC): ICD-10-CM

## 2021-07-19 DIAGNOSIS — K59.00 CONSTIPATION, UNSPECIFIED CONSTIPATION TYPE: ICD-10-CM

## 2021-07-19 LAB
ALBUMIN SERPL-MCNC: 3.86 G/DL (ref 3.5–5.2)
ALBUMIN/GLOB SERPL: 1.3 G/DL
ALP SERPL-CCNC: 65 U/L (ref 39–117)
ALT SERPL W P-5'-P-CCNC: 10 U/L (ref 1–41)
AMYLASE SERPL-CCNC: 34 U/L (ref 28–100)
ANION GAP SERPL CALCULATED.3IONS-SCNC: 12 MMOL/L (ref 5–15)
AST SERPL-CCNC: 13 U/L (ref 1–40)
BACTERIA UR QL AUTO: NORMAL /HPF
BASOPHILS # BLD AUTO: 0.05 10*3/MM3 (ref 0–0.2)
BASOPHILS NFR BLD AUTO: 0.5 % (ref 0–1.5)
BILIRUB SERPL-MCNC: 0.5 MG/DL (ref 0–1.2)
BILIRUB UR QL STRIP: NEGATIVE
BUN SERPL-MCNC: 19 MG/DL (ref 8–23)
BUN/CREAT SERPL: 12 (ref 7–25)
CALCIUM SPEC-SCNC: 8.9 MG/DL (ref 8.6–10.5)
CHLORIDE SERPL-SCNC: 103 MMOL/L (ref 98–107)
CLARITY UR: CLEAR
CO2 SERPL-SCNC: 22 MMOL/L (ref 22–29)
COLOR UR: ABNORMAL
CREAT SERPL-MCNC: 1.58 MG/DL (ref 0.76–1.27)
DEPRECATED RDW RBC AUTO: 40.9 FL (ref 37–54)
EOSINOPHIL # BLD AUTO: 0.16 10*3/MM3 (ref 0–0.4)
EOSINOPHIL NFR BLD AUTO: 1.6 % (ref 0.3–6.2)
ERYTHROCYTE [DISTWIDTH] IN BLOOD BY AUTOMATED COUNT: 13.7 % (ref 12.3–15.4)
GFR SERPL CREATININE-BSD FRML MDRD: 44 ML/MIN/1.73
GLOBULIN UR ELPH-MCNC: 2.9 GM/DL
GLUCOSE SERPL-MCNC: 116 MG/DL (ref 65–99)
GLUCOSE UR STRIP-MCNC: NEGATIVE MG/DL
HCT VFR BLD AUTO: 40.6 % (ref 37.5–51)
HGB BLD-MCNC: 13.8 G/DL (ref 13–17.7)
HGB UR QL STRIP.AUTO: ABNORMAL
HYALINE CASTS UR QL AUTO: NORMAL /LPF
IMM GRANULOCYTES # BLD AUTO: 0.04 10*3/MM3 (ref 0–0.05)
IMM GRANULOCYTES NFR BLD AUTO: 0.4 % (ref 0–0.5)
KETONES UR QL STRIP: ABNORMAL
LEUKOCYTE ESTERASE UR QL STRIP.AUTO: ABNORMAL
LIPASE SERPL-CCNC: 15 U/L (ref 13–60)
LYMPHOCYTES # BLD AUTO: 0.9 10*3/MM3 (ref 0.7–3.1)
LYMPHOCYTES NFR BLD AUTO: 9 % (ref 19.6–45.3)
MCH RBC QN AUTO: 27.9 PG (ref 26.6–33)
MCHC RBC AUTO-ENTMCNC: 34 G/DL (ref 31.5–35.7)
MCV RBC AUTO: 82.2 FL (ref 79–97)
MONOCYTES # BLD AUTO: 0.83 10*3/MM3 (ref 0.1–0.9)
MONOCYTES NFR BLD AUTO: 8.3 % (ref 5–12)
NEUTROPHILS NFR BLD AUTO: 8.02 10*3/MM3 (ref 1.7–7)
NEUTROPHILS NFR BLD AUTO: 80.2 % (ref 42.7–76)
NITRITE UR QL STRIP: NEGATIVE
NRBC BLD AUTO-RTO: 0 /100 WBC (ref 0–0.2)
PH UR STRIP.AUTO: 5.5 [PH] (ref 5–8)
PLATELET # BLD AUTO: 224 10*3/MM3 (ref 140–450)
PMV BLD AUTO: 10.8 FL (ref 6–12)
POTASSIUM SERPL-SCNC: 4.2 MMOL/L (ref 3.5–5.2)
PROT SERPL-MCNC: 6.8 G/DL (ref 6–8.5)
PROT UR QL STRIP: NEGATIVE
RBC # BLD AUTO: 4.94 10*6/MM3 (ref 4.14–5.8)
RBC # UR: NORMAL /HPF
REF LAB TEST METHOD: NORMAL
SODIUM SERPL-SCNC: 137 MMOL/L (ref 136–145)
SP GR UR STRIP: 1.02 (ref 1–1.03)
SQUAMOUS #/AREA URNS HPF: NORMAL /HPF
UROBILINOGEN UR QL STRIP: ABNORMAL
WBC # BLD AUTO: 10 10*3/MM3 (ref 3.4–10.8)
WBC UR QL AUTO: NORMAL /HPF

## 2021-07-19 PROCEDURE — 96374 THER/PROPH/DIAG INJ IV PUSH: CPT

## 2021-07-19 PROCEDURE — 25010000002 ONDANSETRON PER 1 MG: Performed by: PHYSICIAN ASSISTANT

## 2021-07-19 PROCEDURE — 74176 CT ABD & PELVIS W/O CONTRAST: CPT | Performed by: RADIOLOGY

## 2021-07-19 PROCEDURE — 85025 COMPLETE CBC W/AUTO DIFF WBC: CPT | Performed by: PHYSICIAN ASSISTANT

## 2021-07-19 PROCEDURE — 74176 CT ABD & PELVIS W/O CONTRAST: CPT

## 2021-07-19 PROCEDURE — 80053 COMPREHEN METABOLIC PANEL: CPT | Performed by: PHYSICIAN ASSISTANT

## 2021-07-19 PROCEDURE — 83690 ASSAY OF LIPASE: CPT | Performed by: PHYSICIAN ASSISTANT

## 2021-07-19 PROCEDURE — 81001 URINALYSIS AUTO W/SCOPE: CPT | Performed by: PHYSICIAN ASSISTANT

## 2021-07-19 PROCEDURE — 99283 EMERGENCY DEPT VISIT LOW MDM: CPT

## 2021-07-19 PROCEDURE — 82150 ASSAY OF AMYLASE: CPT | Performed by: PHYSICIAN ASSISTANT

## 2021-07-19 RX ORDER — HYDROCODONE BITARTRATE AND ACETAMINOPHEN 5; 325 MG/1; MG/1
1 TABLET ORAL ONCE
Status: COMPLETED | OUTPATIENT
Start: 2021-07-19 | End: 2021-07-19

## 2021-07-19 RX ORDER — POLYETHYLENE GLYCOL 3350 17 G/17G
17 POWDER, FOR SOLUTION ORAL DAILY
Qty: 578 G | Refills: 0 | Status: SHIPPED | OUTPATIENT
Start: 2021-07-19 | End: 2021-07-29

## 2021-07-19 RX ORDER — SODIUM CHLORIDE 0.9 % (FLUSH) 0.9 %
10 SYRINGE (ML) INJECTION AS NEEDED
Status: DISCONTINUED | OUTPATIENT
Start: 2021-07-19 | End: 2021-07-19 | Stop reason: HOSPADM

## 2021-07-19 RX ORDER — ONDANSETRON 2 MG/ML
4 INJECTION INTRAMUSCULAR; INTRAVENOUS ONCE
Status: COMPLETED | OUTPATIENT
Start: 2021-07-19 | End: 2021-07-19

## 2021-07-19 RX ORDER — POLYETHYLENE GLYCOL 3350 17 G/17G
17 POWDER, FOR SOLUTION ORAL DAILY
Status: DISCONTINUED | OUTPATIENT
Start: 2021-07-19 | End: 2021-07-19 | Stop reason: HOSPADM

## 2021-07-19 RX ADMIN — POLYETHYLENE GLYCOL (3350) 17 G: 17 POWDER, FOR SOLUTION ORAL at 13:39

## 2021-07-19 RX ADMIN — HYDROCODONE BITARTRATE AND ACETAMINOPHEN 1 TABLET: 5; 325 TABLET ORAL at 11:53

## 2021-07-19 RX ADMIN — SODIUM CHLORIDE 500 ML: 9 INJECTION, SOLUTION INTRAVENOUS at 11:46

## 2021-07-19 RX ADMIN — ONDANSETRON 4 MG: 2 INJECTION INTRAMUSCULAR; INTRAVENOUS at 11:46

## 2021-07-19 NOTE — ED NOTES
Patient and spouse is refusing Morphine d/t patient already have a morphine pump. Patient states that he does wanting something for pain. Patient updated that provider is doing a laceration repair at this time and will notify her once she is finish. Patient provided a urinal at this time and is attempting to give urine sample.      Yareli Riojas RN  07/19/21 8988

## 2021-07-19 NOTE — ED PROVIDER NOTES
Subjective   This is a 71 year old male patient who presents to the ER with chief complaint of right sided flank pain. This started on Saturday morning at about 2 am. It radiates into the low back on the right side. He also has dysuria, nausea and vomiting. In addition, he has had constipation with no bowel movement in 5 days. No fever. No diarrhea.           Review of Systems   Constitutional: Negative.  Negative for fever.   HENT: Negative.    Respiratory: Negative.    Cardiovascular: Negative.  Negative for chest pain.   Gastrointestinal: Positive for abdominal pain, constipation, nausea and vomiting. Negative for abdominal distention, anal bleeding, blood in stool, diarrhea and rectal pain.   Endocrine: Negative.    Genitourinary: Positive for dysuria. Negative for decreased urine volume, difficulty urinating, discharge, enuresis, flank pain, frequency, genital sores, hematuria, penile pain, penile swelling, scrotal swelling, testicular pain and urgency.   Skin: Negative.    Neurological: Negative.    Psychiatric/Behavioral: Negative.    All other systems reviewed and are negative.      Past Medical History:   Diagnosis Date   • COPD (chronic obstructive pulmonary disease) (CMS/HCC)    • COVID-19 2021   • Diabetes mellitus (CMS/HCC)    • Diverticulitis    • Emphysema lung (CMS/HCC)    • Emphysema lung (CMS/HCC)    • Hyperlipidemia    • Hypertension    • Kidney stone    • Stomach ulcer    • Stroke (CMS/HCC)        Allergies   Allergen Reactions   • Claritin [Loratadine] Other (See Comments)     Possible kidney issues   • Codeine Other (See Comments)     Kidney issues        Past Surgical History:   Procedure Laterality Date   • APPENDECTOMY     • CARDIAC CATHETERIZATION     • CORONARY STENT PLACEMENT     • ENDOSCOPY     • PAIN PUMP TRIAL     • TURP / TRANSURETHRAL INCISION / DRAINAGE PROSTATE         Family History   Problem Relation Age of Onset   • Cancer Mother    • Heart disease Father    • Stroke Father         Social History     Socioeconomic History   • Marital status:      Spouse name: Not on file   • Number of children: Not on file   • Years of education: Not on file   • Highest education level: Not on file   Tobacco Use   • Smoking status: Former Smoker   • Smokeless tobacco: Never Used   • Tobacco comment: quit 20+ yrs ago    Substance and Sexual Activity   • Alcohol use: No     Comment: former use 20 + yrs ago    • Drug use: No   • Sexual activity: Defer           Objective   Physical Exam  Vitals and nursing note reviewed.   Constitutional:       General: He is not in acute distress.     Appearance: He is well-developed. He is not diaphoretic.   HENT:      Head: Normocephalic and atraumatic.      Right Ear: External ear normal.      Left Ear: External ear normal.      Nose: Nose normal.   Eyes:      Conjunctiva/sclera: Conjunctivae normal.      Pupils: Pupils are equal, round, and reactive to light.   Neck:      Vascular: No JVD.      Trachea: No tracheal deviation.   Cardiovascular:      Rate and Rhythm: Normal rate and regular rhythm.      Heart sounds: Normal heart sounds. No murmur heard.     Pulmonary:      Effort: Pulmonary effort is normal. No respiratory distress.      Breath sounds: Normal breath sounds. No stridor. No wheezing, rhonchi or rales.   Chest:      Chest wall: No tenderness.   Abdominal:      General: Bowel sounds are normal. There is no distension.      Palpations: Abdomen is soft. There is no mass.      Tenderness: There is no abdominal tenderness. There is no right CVA tenderness, left CVA tenderness, guarding or rebound.   Musculoskeletal:         General: No deformity. Normal range of motion.      Cervical back: Normal range of motion and neck supple.   Skin:     General: Skin is warm and dry.      Coloration: Skin is not pale.      Findings: No erythema or rash.   Neurological:      Mental Status: He is alert and oriented to person, place, and time.      Cranial Nerves: No  cranial nerve deficit.   Psychiatric:         Behavior: Behavior normal.         Thought Content: Thought content normal.         Procedures           ED Course  ED Course as of Jul 19 1328   Mon Jul 19, 2021   1301 IMPRESSION:    Right proximal ureteral calculus measuring about 1.3 cm causes  moderate right hydronephrosis.     This report was finalized on 7/19/2021 12:55 PM by Dr. Mario Beavers MD.   CT Abdomen Pelvis Stone Protocol [MM]   1324 Discussed with patient that his creatinine was elevated likely due to his current ureteral stone trying to pass. We don't have urology coverage today so I offered to have him transferred to another facility. He says he has an appointment with Dr. Melendrez tomorrow and he would rather go home and keep that appointment. He already has flomax at home. He will increase his fluids and utilize his morphine pump.     [MM]      ED Course User Index  [MM] Jeny Parish PA                                           MDM  Number of Diagnoses or Management Options     Amount and/or Complexity of Data Reviewed  Clinical lab tests: ordered and reviewed  Tests in the radiology section of CPT®: ordered and reviewed  Decide to obtain previous medical records or to obtain history from someone other than the patient: yes  Discuss the patient with other providers: yes        Final diagnoses:   Right ureteral stone   VIKTOR (acute kidney injury) (CMS/[facility])   Constipation, unspecified constipation type       ED Disposition  ED Disposition     ED Disposition Condition Comment    Discharge Stable           Rose Stoll MD  [address] [facility] [id number] [phone]    In 2 days      Nabeel Melendrez MD  [address] [facility] [address]  [facility] [address]  [phone]    Go in 1 day           Medication List      New Prescriptions    polyethylene glycol 17 GM/SCOOP powder  Commonly known as: MIRALAX  Take 17 g by mouth Daily for 10 days.           Where to Get Your Medications       These medications [id number] - [place] [address] AT [place] [address] & [facility] [place] [phone] [facility] [phone] [address] [address]    Phone: [phone]   · polyethylene glycol 17 GM/SCOOP powder          Jeny Parish PA  07/19/21 132

## 2021-07-19 NOTE — DISCHARGE INSTRUCTIONS
Please increase fluids, miralax. Utilize your morphine pump and take flomax. Please follow up with Dr. Melendrez tomorrow or return to ER if symptoms worsen.

## 2021-07-20 ENCOUNTER — OFFICE VISIT (OUTPATIENT)
Dept: UROLOGY | Facility: CLINIC | Age: 71
End: 2021-07-20

## 2021-07-20 VITALS — HEIGHT: 69 IN | WEIGHT: 200 LBS | BODY MASS INDEX: 29.62 KG/M2

## 2021-07-20 DIAGNOSIS — K58.1 IRRITABLE BOWEL SYNDROME WITH CONSTIPATION: ICD-10-CM

## 2021-07-20 DIAGNOSIS — N20.0 STAGHORN KIDNEY STONES: ICD-10-CM

## 2021-07-20 DIAGNOSIS — N20.0 KIDNEY STONE: Primary | ICD-10-CM

## 2021-07-20 PROCEDURE — 99213 OFFICE O/P EST LOW 20 MIN: CPT | Performed by: UROLOGY

## 2021-07-20 RX ORDER — GENTAMICIN SULFATE 80 MG/100ML
80 INJECTION, SOLUTION INTRAVENOUS ONCE
Status: CANCELLED | OUTPATIENT
Start: 2021-07-20 | End: 2021-07-20

## 2021-07-20 RX ORDER — TRAMADOL HYDROCHLORIDE 50 MG/1
50 TABLET ORAL EVERY 6 HOURS PRN
Qty: 60 TABLET | Refills: 2 | Status: SHIPPED | OUTPATIENT
Start: 2021-07-20 | End: 2022-03-08

## 2021-07-20 NOTE — PROGRESS NOTES
Chief Complaint:          Chief Complaint   Patient presents with   • Flank Pain     follow up        HPI:   71 y.o. male well-known to me seen in the ER with a CT scan showing a 1.1 cm UPJ stone he is also severely constipated no bowel movement for 5 days.  Renal calculus-we discussed the presence of the stone we discussed the various therapeutic options available including percutaneous nephrostolithotomy, lithotripsy.  We discussed the risks of lithotripsy including the passage of stones the development of a large string of stones in the distal ureter known as Steinstrasse.  In the 3% incidence of that we will need to proceed with a ureteroscopy for obstructing fragments.  Extremely rare incidence of renal hematoma.  And the significance of this.  We discussed the absolute relative indicators for intervention including the presence of sepsis, and pain we cannot control is the primary need for urgent intervention.  We discussed placement of a stent if indicated and the management of the stent as well.  ESWL-the patient is a candidate for extracorporeal shockwave lithotripsy.  We discussed the type of stone.  And the complications associated with the procedure including but not limited to pain in the flank, hematoma, spontaneous renal hemorrhage, and adequate fragmentation of stones.  The need for passage of the stones.  The need for concomitant additional procedures in the range of 24% the risk of a distal fragment in the range of 3% requiring ureteroscopic removal.      Past Medical History:        Past Medical History:   Diagnosis Date   • COPD (chronic obstructive pulmonary disease) (CMS/HCC)    • COVID-19 2021   • Diabetes mellitus (CMS/HCC)    • Diverticulitis    • Emphysema lung (CMS/HCC)    • Emphysema lung (CMS/HCC)    • Hyperlipidemia    • Hypertension    • Kidney stone    • Stomach ulcer    • Stroke (CMS/HCC)          Current Meds:     Current Outpatient Medications   Medication Sig Dispense Refill   •  aspirin 81 MG tablet Take 1 tablet by mouth Daily. 30 tablet 11   • donepezil (ARICEPT) 5 MG tablet Take 1 tablet by mouth Daily.     • famotidine (PEPCID) 40 MG tablet Take 40 mg by mouth every night at bedtime.     • glimepiride (AMARYL) 4 MG tablet Take 1 tablet by mouth Daily.     • levothyroxine (SYNTHROID, LEVOTHROID) 50 MCG tablet Take 50 mcg by mouth Daily.  0   • lisinopril (PRINIVIL,ZESTRIL) 20 MG tablet Take 1 tablet by mouth 2 (Two) Times a Day. 60 tablet 5   • memantine (NAMENDA) 5 MG tablet Take 1 tablet by mouth Daily.     • metFORMIN (GLUCOPHAGE) 500 MG tablet Take 500 mg by mouth 2 (Two) Times a Day With Meals.  0   • metoprolol succinate XL (TOPROL-XL) 25 MG 24 hr tablet Take 1 tablet by mouth Daily. 30 tablet 11   • nitrofurantoin, macrocrystal-monohydrate, (Macrobid) 100 MG capsule Take 1 capsule by mouth Daily. 56 capsule 3   • nitrofurantoin, macrocrystal-monohydrate, (MACROBID) 100 MG capsule Take 100 mg by mouth 2 (Two) Times a Day.     • nitroglycerin (NITROSTAT) 0.4 MG SL tablet 1 under the tongue as needed for angina, may repeat q5mins for up three doses 100 tablet 11   • NON FORMULARY Morphine pump     • omeprazole (priLOSEC) 40 MG capsule Take 40 mg by mouth Daily.     • ondansetron ODT (ZOFRAN-ODT) 4 MG disintegrating tablet Take 1 tablet by mouth 4 (Four) Times a Day. 15 tablet 0   • polyethylene glycol (MIRALAX) 17 GM/SCOOP powder Take 17 g by mouth Daily for 10 days. 578 g 0   • Probiotic Product (PROBIOTIC DAILY PO) Take  by mouth Daily.     • Sennosides-Docusate Sodium (SENNA PLUS PO) Take  by mouth 3 (Three) Times a Day.     • sertraline (ZOLOFT) 25 MG tablet Take 1 tablet by mouth Daily.     • simvastatin (ZOCOR) 40 MG tablet Take  by mouth Daily.  0   • tamsulosin (FLOMAX) 0.4 MG capsule 24 hr capsule Take 1 capsule by mouth Daily.       No current facility-administered medications for this visit.        Allergies:      Allergies   Allergen Reactions   • Claritin [Loratadine]  Other (See Comments)     Possible kidney issues   • Codeine Other (See Comments)     Kidney issues         Past Surgical History:     Past Surgical History:   Procedure Laterality Date   • APPENDECTOMY     • CARDIAC CATHETERIZATION     • CORONARY STENT PLACEMENT     • ENDOSCOPY     • PAIN PUMP TRIAL     • TURP / TRANSURETHRAL INCISION / DRAINAGE PROSTATE           Social History:     Social History     Socioeconomic History   • Marital status:      Spouse name: Not on file   • Number of children: Not on file   • Years of education: Not on file   • Highest education level: Not on file   Tobacco Use   • Smoking status: Former Smoker   • Smokeless tobacco: Never Used   • Tobacco comment: quit 20+ yrs ago    Substance and Sexual Activity   • Alcohol use: No     Comment: former use 20 + yrs ago    • Drug use: No   • Sexual activity: Defer       Family History:     Family History   Problem Relation Age of Onset   • Cancer Mother    • Heart disease Father    • Stroke Father        Review of Systems:     Review of Systems   Constitutional: Negative.    HENT: Negative.    Eyes: Negative.    Respiratory: Negative.    Cardiovascular: Negative.    Gastrointestinal: Negative.    Endocrine: Negative.    Genitourinary: Positive for flank pain.   Allergic/Immunologic: Negative.    Neurological: Negative.    Hematological: Negative.    Psychiatric/Behavioral: Negative.        Physical Exam:     Physical Exam  Vitals and nursing note reviewed.   Constitutional:       Appearance: He is well-developed.   HENT:      Head: Normocephalic and atraumatic.   Eyes:      Conjunctiva/sclera: Conjunctivae normal.      Pupils: Pupils are equal, round, and reactive to light.   Cardiovascular:      Rate and Rhythm: Normal rate and regular rhythm.      Heart sounds: Normal heart sounds.   Pulmonary:      Effort: Pulmonary effort is normal.      Breath sounds: Normal breath sounds.   Abdominal:      General: Bowel sounds are normal.       Palpations: Abdomen is soft.   Musculoskeletal:         General: Normal range of motion.      Cervical back: Normal range of motion.   Skin:     General: Skin is warm and dry.   Neurological:      Mental Status: He is alert and oriented to person, place, and time.      Deep Tendon Reflexes: Reflexes are normal and symmetric.   Psychiatric:         Behavior: Behavior normal.         Thought Content: Thought content normal.         Judgment: Judgment normal.         I have reviewed the following portions of the patient's history: allergies, current medications, past family history, past medical history, past social history, past surgical history, problem list and ROS and confirm it's accurate.      Procedure:       Assessment/Plan:   Renal calculus-we discussed the presence of the stone we discussed the various therapeutic options available including percutaneous nephrostolithotomy, lithotripsy.  We discussed the risks of lithotripsy including the passage of stones the development of a large string of stones in the distal ureter known as Steinstrasse.  In the 3% incidence of that we will need to proceed with a ureteroscopy for obstructing fragments.  Extremely rare incidence of renal hematoma.  And the significance of this.  We discussed the absolute relative indicators for intervention including the presence of sepsis, and pain we cannot control is the primary need for urgent intervention.  We discussed placement of a stent if indicated and the management of the stent as well.  IBS-patient has significant irritable bowel syndrome characterized by extreme amounts of constipation.  We spoke about the impact of this on bladder function.  And its relationship to recurrent urinary tract infections.  We discussed the need for increasing fluid and discussed the physiology of colonic motility as well as use of MiraLAX as a bulk laxative versus the newer class of serotonin uptake blockers such as Linzess.  We stressed the  need for a daily bowel movement and discussed the [place] stool scale at length.  We also discussed a referral to the GI nurse practitioner he was given Linzess for opioid induced constipation              This document has been electronically signed by MARY CASTILLO MD July 20, 2021 09:09 EDT

## 2021-07-20 NOTE — H&P (VIEW-ONLY)
Chief Complaint:          Chief Complaint   Patient presents with   • Flank Pain     follow up        HPI:   71 y.o. male well-known to me seen in the ER with a CT scan showing a 1.1 cm UPJ stone he is also severely constipated no bowel movement for 5 days.  Renal calculus-we discussed the presence of the stone we discussed the various therapeutic options available including percutaneous nephrostolithotomy, lithotripsy.  We discussed the risks of lithotripsy including the passage of stones the development of a large string of stones in the distal ureter known as Steinstrasse.  In the 3% incidence of that we will need to proceed with a ureteroscopy for obstructing fragments.  Extremely rare incidence of renal hematoma.  And the significance of this.  We discussed the absolute relative indicators for intervention including the presence of sepsis, and pain we cannot control is the primary need for urgent intervention.  We discussed placement of a stent if indicated and the management of the stent as well.  ESWL-the patient is a candidate for extracorporeal shockwave lithotripsy.  We discussed the type of stone.  And the complications associated with the procedure including but not limited to pain in the flank, hematoma, spontaneous renal hemorrhage, and adequate fragmentation of stones.  The need for passage of the stones.  The need for concomitant additional procedures in the range of 24% the risk of a distal fragment in the range of 3% requiring ureteroscopic removal.      Past Medical History:        Past Medical History:   Diagnosis Date   • COPD (chronic obstructive pulmonary disease) (CMS/HCC)    • COVID-19 2021   • Diabetes mellitus (CMS/HCC)    • Diverticulitis    • Emphysema lung (CMS/HCC)    • Emphysema lung (CMS/HCC)    • Hyperlipidemia    • Hypertension    • Kidney stone    • Stomach ulcer    • Stroke (CMS/HCC)          Current Meds:     Current Outpatient Medications   Medication Sig Dispense Refill   •  aspirin 81 MG tablet Take 1 tablet by mouth Daily. 30 tablet 11   • donepezil (ARICEPT) 5 MG tablet Take 1 tablet by mouth Daily.     • famotidine (PEPCID) 40 MG tablet Take 40 mg by mouth every night at bedtime.     • glimepiride (AMARYL) 4 MG tablet Take 1 tablet by mouth Daily.     • levothyroxine (SYNTHROID, LEVOTHROID) 50 MCG tablet Take 50 mcg by mouth Daily.  0   • lisinopril (PRINIVIL,ZESTRIL) 20 MG tablet Take 1 tablet by mouth 2 (Two) Times a Day. 60 tablet 5   • memantine (NAMENDA) 5 MG tablet Take 1 tablet by mouth Daily.     • metFORMIN (GLUCOPHAGE) 500 MG tablet Take 500 mg by mouth 2 (Two) Times a Day With Meals.  0   • metoprolol succinate XL (TOPROL-XL) 25 MG 24 hr tablet Take 1 tablet by mouth Daily. 30 tablet 11   • nitrofurantoin, macrocrystal-monohydrate, (Macrobid) 100 MG capsule Take 1 capsule by mouth Daily. 56 capsule 3   • nitrofurantoin, macrocrystal-monohydrate, (MACROBID) 100 MG capsule Take 100 mg by mouth 2 (Two) Times a Day.     • nitroglycerin (NITROSTAT) 0.4 MG SL tablet 1 under the tongue as needed for angina, may repeat q5mins for up three doses 100 tablet 11   • NON FORMULARY Morphine pump     • omeprazole (priLOSEC) 40 MG capsule Take 40 mg by mouth Daily.     • ondansetron ODT (ZOFRAN-ODT) 4 MG disintegrating tablet Take 1 tablet by mouth 4 (Four) Times a Day. 15 tablet 0   • polyethylene glycol (MIRALAX) 17 GM/SCOOP powder Take 17 g by mouth Daily for 10 days. 578 g 0   • Probiotic Product (PROBIOTIC DAILY PO) Take  by mouth Daily.     • Sennosides-Docusate Sodium (SENNA PLUS PO) Take  by mouth 3 (Three) Times a Day.     • sertraline (ZOLOFT) 25 MG tablet Take 1 tablet by mouth Daily.     • simvastatin (ZOCOR) 40 MG tablet Take  by mouth Daily.  0   • tamsulosin (FLOMAX) 0.4 MG capsule 24 hr capsule Take 1 capsule by mouth Daily.       No current facility-administered medications for this visit.        Allergies:      Allergies   Allergen Reactions   • Claritin [Loratadine]  Other (See Comments)     Possible kidney issues   • Codeine Other (See Comments)     Kidney issues         Past Surgical History:     Past Surgical History:   Procedure Laterality Date   • APPENDECTOMY     • CARDIAC CATHETERIZATION     • CORONARY STENT PLACEMENT     • ENDOSCOPY     • PAIN PUMP TRIAL     • TURP / TRANSURETHRAL INCISION / DRAINAGE PROSTATE           Social History:     Social History     Socioeconomic History   • Marital status:      Spouse name: Not on file   • Number of children: Not on file   • Years of education: Not on file   • Highest education level: Not on file   Tobacco Use   • Smoking status: Former Smoker   • Smokeless tobacco: Never Used   • Tobacco comment: quit 20+ yrs ago    Substance and Sexual Activity   • Alcohol use: No     Comment: former use 20 + yrs ago    • Drug use: No   • Sexual activity: Defer       Family History:     Family History   Problem Relation Age of Onset   • Cancer Mother    • Heart disease Father    • Stroke Father        Review of Systems:     Review of Systems   Constitutional: Negative.    HENT: Negative.    Eyes: Negative.    Respiratory: Negative.    Cardiovascular: Negative.    Gastrointestinal: Negative.    Endocrine: Negative.    Genitourinary: Positive for flank pain.   Allergic/Immunologic: Negative.    Neurological: Negative.    Hematological: Negative.    Psychiatric/Behavioral: Negative.        Physical Exam:     Physical Exam  Vitals and nursing note reviewed.   Constitutional:       Appearance: He is well-developed.   HENT:      Head: Normocephalic and atraumatic.   Eyes:      Conjunctiva/sclera: Conjunctivae normal.      Pupils: Pupils are equal, round, and reactive to light.   Cardiovascular:      Rate and Rhythm: Normal rate and regular rhythm.      Heart sounds: Normal heart sounds.   Pulmonary:      Effort: Pulmonary effort is normal.      Breath sounds: Normal breath sounds.   Abdominal:      General: Bowel sounds are normal.       Palpations: Abdomen is soft.   Musculoskeletal:         General: Normal range of motion.      Cervical back: Normal range of motion.   Skin:     General: Skin is warm and dry.   Neurological:      Mental Status: He is alert and oriented to person, place, and time.      Deep Tendon Reflexes: Reflexes are normal and symmetric.   Psychiatric:         Behavior: Behavior normal.         Thought Content: Thought content normal.         Judgment: Judgment normal.         I have reviewed the following portions of the patient's history: allergies, current medications, past family history, past medical history, past social history, past surgical history, problem list and ROS and confirm it's accurate.      Procedure:       Assessment/Plan:   Renal calculus-we discussed the presence of the stone we discussed the various therapeutic options available including percutaneous nephrostolithotomy, lithotripsy.  We discussed the risks of lithotripsy including the passage of stones the development of a large string of stones in the distal ureter known as Steinstrasse.  In the 3% incidence of that we will need to proceed with a ureteroscopy for obstructing fragments.  Extremely rare incidence of renal hematoma.  And the significance of this.  We discussed the absolute relative indicators for intervention including the presence of sepsis, and pain we cannot control is the primary need for urgent intervention.  We discussed placement of a stent if indicated and the management of the stent as well.  IBS-patient has significant irritable bowel syndrome characterized by extreme amounts of constipation.  We spoke about the impact of this on bladder function.  And its relationship to recurrent urinary tract infections.  We discussed the need for increasing fluid and discussed the physiology of colonic motility as well as use of MiraLAX as a bulk laxative versus the newer class of serotonin uptake blockers such as Linzess.  We stressed the  need for a daily bowel movement and discussed the [place] stool scale at length.  We also discussed a referral to the GI nurse practitioner he was given Linzess for opioid induced constipation              This document has been electronically signed by MARY CASTILLO MD July 20, 2021 09:09 EDT

## 2021-07-21 ENCOUNTER — LAB (OUTPATIENT)
Dept: LAB | Facility: HOSPITAL | Age: 71
End: 2021-07-21

## 2021-07-21 ENCOUNTER — PRE-ADMISSION TESTING (OUTPATIENT)
Dept: PREADMISSION TESTING | Facility: HOSPITAL | Age: 71
End: 2021-07-21

## 2021-07-21 DIAGNOSIS — Z01.818 PREOPERATIVE CLEARANCE: Primary | ICD-10-CM

## 2021-07-21 DIAGNOSIS — N20.0 KIDNEY STONE: ICD-10-CM

## 2021-07-21 PROBLEM — K58.1 IRRITABLE BOWEL SYNDROME WITH CONSTIPATION: Status: ACTIVE | Noted: 2021-07-21

## 2021-07-21 LAB — SARS-COV-2 RNA RESP QL NAA+PROBE: NOT DETECTED

## 2021-07-21 PROCEDURE — U0003 INFECTIOUS AGENT DETECTION BY NUCLEIC ACID (DNA OR RNA); SEVERE ACUTE RESPIRATORY SYNDROME CORONAVIRUS 2 (SARS-COV-2) (CORONAVIRUS DISEASE [COVID-19]), AMPLIFIED PROBE TECHNIQUE, MAKING USE OF HIGH THROUGHPUT TECHNOLOGIES AS DESCRIBED BY CMS-2020-01-R: HCPCS | Performed by: UROLOGY

## 2021-07-21 PROCEDURE — C9803 HOPD COVID-19 SPEC COLLECT: HCPCS

## 2021-07-21 NOTE — DISCHARGE INSTRUCTIONS
TAKE the following medications the morning of surgery:    All heart or blood pressure medications    Please discontinue all blood thinners and anticoagulants (except aspirin) prior to surgery as per your surgeon and cardiologist instructions.  Aspirin may be continued up to the day prior to surgery.    HOLD all diabetic medications the morning of surgery as order by physician.    Arrival time for surgery on 7/23/21 will be given to you by Dr. Melendrez's office.    A RESPONSIBLE PERSON MUST REMAIN IN THE WAITING ROOM DURING YOUR PROCEDURE AND A RESPONSIBLE  MUST BE AVAILABLE UPON YOUR DISCHARGE.    General Instructions:  • Do NOT eat or drink after midnight 7/22/21 which includes water, mints, or gum.  • You may brush your teeth. Dental appliances that are removable must be taken out day of surgery.  • Do NOT smoke, chew tobacco, or drink alcohol within 24 hours prior to surgery.  • Bring medications in original bottles, any inhalers and if applicable your C-PAP/BI-PAP machine  • Bring any papers given to you in the doctor’s office  • Wear clean, comfortable clothes and socks  • Do NOT wear contact lenses or make-up or dark nail polish.  Bring a case for your glasses if applicable.  • Bring crutches or walker if applicable  • Leave all other valuables and jewelry at home  • If you were given a blood bank armband, continue to wear it until discharged.    Preventing a Surgical Site Infection:  • Shower the night before surgery (unless instructed otherwise) using a fresh bar of anti-bacterial soap (such as Dial) and clean washcloth.  Dry with a clean towel and dress in clean clothing.  • For 2 to 3 days before surgery, avoid shaving with a razor near where you will have surgery because the razor can irritate skin and make it easier to develop an infection.  Ask your surgeon if you will be receiving antibiotics prior to surgery.  • Make sure you, your family, and all healthcare providers clean their hands with soap  and water or an alcohol-based hand  before caring for you or your wound.  • If at all possible, quit smoking as many days before surgery as you can.    Day of Surgery:  Upon arrival, a pre-op nurse and anesthesiologist will review your health history, obtain vital signs, and answer questions you may have.  The only belongings needed at this time will be your home medications and if applicable you C-PAP/BI-PAP machine.  If you are staying overnight, your family can leave the rest of your belongings in the car and bring them to your room later.  A pre-op nurse will start an IV and you may receive medication in preparation for surgery.  Due to patient privacy and limited space, only one member of your family will be able to accompany you in the pre-op area.  While you are in surgery your family should notify the waiting room  if they leave the waiting room area and provide a contact number.  Please be aware that surgery does come with discomfort.  We want to make every effort to control your discomfort so please discuss any uncontrolled symptoms with your nurse.  Your doctor will most likely have prescribed pain medications.  If you are going home after surgery you will receive individualized written care instructions before being discharged.  A responsible adult must drive you to and from the hospital on the day of surgery and stay with you for 24 hours.  If you are staying overnight following surgery, you will be transported to your hospital room following the recovery period.

## 2021-07-23 ENCOUNTER — HOSPITAL ENCOUNTER (OUTPATIENT)
Facility: HOSPITAL | Age: 71
Setting detail: HOSPITAL OUTPATIENT SURGERY
Discharge: HOME | End: 2021-07-23
Attending: UROLOGY | Admitting: UROLOGY

## 2021-07-23 ENCOUNTER — ANESTHESIA EVENT (OUTPATIENT)
Dept: PERIOP | Facility: HOSPITAL | Age: 71
End: 2021-07-23

## 2021-07-23 ENCOUNTER — ANESTHESIA (OUTPATIENT)
Dept: PERIOP | Facility: HOSPITAL | Age: 71
End: 2021-07-23

## 2021-07-23 VITALS
HEIGHT: 69 IN | BODY MASS INDEX: 29.21 KG/M2 | DIASTOLIC BLOOD PRESSURE: 86 MMHG | WEIGHT: 197.2 LBS | TEMPERATURE: 97.8 F | RESPIRATION RATE: 16 BRPM | SYSTOLIC BLOOD PRESSURE: 180 MMHG | HEART RATE: 63 BPM | OXYGEN SATURATION: 97 %

## 2021-07-23 DIAGNOSIS — N20.0 KIDNEY STONE: ICD-10-CM

## 2021-07-23 LAB — GLUCOSE BLDC GLUCOMTR-MCNC: 116 MG/DL (ref 70–130)

## 2021-07-23 PROCEDURE — 25010000002 ONDANSETRON PER 1 MG: Performed by: NURSE ANESTHETIST, CERTIFIED REGISTERED

## 2021-07-23 PROCEDURE — 25010000002 GENTAMICIN PER 80 MG: Performed by: UROLOGY

## 2021-07-23 PROCEDURE — 25010000002 PROPOFOL 10 MG/ML EMULSION: Performed by: NURSE ANESTHETIST, CERTIFIED REGISTERED

## 2021-07-23 PROCEDURE — 82962 GLUCOSE BLOOD TEST: CPT

## 2021-07-23 PROCEDURE — 50590 FRAGMENTING OF KIDNEY STONE: CPT | Performed by: UROLOGY

## 2021-07-23 RX ORDER — IPRATROPIUM BROMIDE AND ALBUTEROL SULFATE 2.5; .5 MG/3ML; MG/3ML
3 SOLUTION RESPIRATORY (INHALATION) ONCE AS NEEDED
Status: DISCONTINUED | OUTPATIENT
Start: 2021-07-23 | End: 2021-07-23 | Stop reason: HOSPADM

## 2021-07-23 RX ORDER — OXYCODONE AND ACETAMINOPHEN 10; 325 MG/1; MG/1
1 TABLET ORAL EVERY 4 HOURS PRN
Qty: 12 TABLET | Refills: 0 | Status: SHIPPED | OUTPATIENT
Start: 2021-07-23 | End: 2021-09-09

## 2021-07-23 RX ORDER — DROPERIDOL 2.5 MG/ML
0.62 INJECTION, SOLUTION INTRAMUSCULAR; INTRAVENOUS ONCE AS NEEDED
Status: DISCONTINUED | OUTPATIENT
Start: 2021-07-23 | End: 2021-07-23 | Stop reason: HOSPADM

## 2021-07-23 RX ORDER — SODIUM CHLORIDE, SODIUM LACTATE, POTASSIUM CHLORIDE, CALCIUM CHLORIDE 600; 310; 30; 20 MG/100ML; MG/100ML; MG/100ML; MG/100ML
INJECTION, SOLUTION INTRAVENOUS CONTINUOUS PRN
Status: DISCONTINUED | OUTPATIENT
Start: 2021-07-23 | End: 2021-07-23 | Stop reason: SURG

## 2021-07-23 RX ORDER — SODIUM CHLORIDE 0.9 % (FLUSH) 0.9 %
10 SYRINGE (ML) INJECTION EVERY 12 HOURS SCHEDULED
Status: DISCONTINUED | OUTPATIENT
Start: 2021-07-23 | End: 2021-07-23 | Stop reason: HOSPADM

## 2021-07-23 RX ORDER — SODIUM CHLORIDE, SODIUM LACTATE, POTASSIUM CHLORIDE, CALCIUM CHLORIDE 600; 310; 30; 20 MG/100ML; MG/100ML; MG/100ML; MG/100ML
125 INJECTION, SOLUTION INTRAVENOUS ONCE
Status: COMPLETED | OUTPATIENT
Start: 2021-07-23 | End: 2021-07-23

## 2021-07-23 RX ORDER — PROPOFOL 10 MG/ML
VIAL (ML) INTRAVENOUS AS NEEDED
Status: DISCONTINUED | OUTPATIENT
Start: 2021-07-23 | End: 2021-07-23 | Stop reason: SURG

## 2021-07-23 RX ORDER — ONDANSETRON 2 MG/ML
INJECTION INTRAMUSCULAR; INTRAVENOUS AS NEEDED
Status: DISCONTINUED | OUTPATIENT
Start: 2021-07-23 | End: 2021-07-23 | Stop reason: SURG

## 2021-07-23 RX ORDER — SODIUM CHLORIDE, SODIUM LACTATE, POTASSIUM CHLORIDE, CALCIUM CHLORIDE 600; 310; 30; 20 MG/100ML; MG/100ML; MG/100ML; MG/100ML
100 INJECTION, SOLUTION INTRAVENOUS ONCE AS NEEDED
Status: DISCONTINUED | OUTPATIENT
Start: 2021-07-23 | End: 2021-07-23 | Stop reason: HOSPADM

## 2021-07-23 RX ORDER — MEPERIDINE HYDROCHLORIDE 25 MG/ML
12.5 INJECTION INTRAMUSCULAR; INTRAVENOUS; SUBCUTANEOUS
Status: DISCONTINUED | OUTPATIENT
Start: 2021-07-23 | End: 2021-07-23 | Stop reason: HOSPADM

## 2021-07-23 RX ORDER — MIDAZOLAM HYDROCHLORIDE 1 MG/ML
0.5 INJECTION INTRAMUSCULAR; INTRAVENOUS
Status: DISCONTINUED | OUTPATIENT
Start: 2021-07-23 | End: 2021-07-23 | Stop reason: HOSPADM

## 2021-07-23 RX ORDER — SODIUM CHLORIDE 0.9 % (FLUSH) 0.9 %
10 SYRINGE (ML) INJECTION AS NEEDED
Status: DISCONTINUED | OUTPATIENT
Start: 2021-07-23 | End: 2021-07-23 | Stop reason: HOSPADM

## 2021-07-23 RX ORDER — ONDANSETRON 2 MG/ML
4 INJECTION INTRAMUSCULAR; INTRAVENOUS AS NEEDED
Status: DISCONTINUED | OUTPATIENT
Start: 2021-07-23 | End: 2021-07-23 | Stop reason: HOSPADM

## 2021-07-23 RX ORDER — GENTAMICIN SULFATE 80 MG/100ML
80 INJECTION, SOLUTION INTRAVENOUS ONCE
Status: COMPLETED | OUTPATIENT
Start: 2021-07-23 | End: 2021-07-23

## 2021-07-23 RX ORDER — FENTANYL CITRATE 50 UG/ML
50 INJECTION, SOLUTION INTRAMUSCULAR; INTRAVENOUS
Status: DISCONTINUED | OUTPATIENT
Start: 2021-07-23 | End: 2021-07-23 | Stop reason: HOSPADM

## 2021-07-23 RX ADMIN — GENTAMICIN SULFATE 80 MG: 80 INJECTION, SOLUTION INTRAVENOUS at 11:57

## 2021-07-23 RX ADMIN — SODIUM CHLORIDE, POTASSIUM CHLORIDE, SODIUM LACTATE AND CALCIUM CHLORIDE 125 ML/HR: 600; 310; 30; 20 INJECTION, SOLUTION INTRAVENOUS at 11:05

## 2021-07-23 RX ADMIN — SODIUM CHLORIDE, POTASSIUM CHLORIDE, SODIUM LACTATE AND CALCIUM CHLORIDE: 600; 310; 30; 20 INJECTION, SOLUTION INTRAVENOUS at 11:57

## 2021-07-23 RX ADMIN — ONDANSETRON 4 MG: 2 INJECTION INTRAMUSCULAR; INTRAVENOUS at 11:57

## 2021-07-23 RX ADMIN — PROPOFOL 140 MG: 10 INJECTION, EMULSION INTRAVENOUS at 12:02

## 2021-07-23 NOTE — ANESTHESIA PROCEDURE NOTES
Airway  Urgency: elective    Date/Time: 7/23/2021 11:57 AM  Airway not difficult    General Information and Staff    Patient location during procedure: OR  Anesthesiologist: Donato Barboza MD  CRNA: Syed Elkins CRNA    Indications and Patient Condition    Preoxygenated: yes  MILS not maintained throughout  Mask difficulty assessment: 0 - not attempted    Final Airway Details  Final airway type: supraglottic airway      Successful airway: unique  Size 4    Number of attempts at approach: 1  Assessment: lips, teeth, and gum same as pre-op and atraumatic intubation    Additional Comments  Atraumatic LMA placement, dentition unchanged.

## 2021-07-23 NOTE — ANESTHESIA POSTPROCEDURE EVALUATION
Patient: Ye Butler    Procedure Summary     Date: 07/23/21 Room / Location: [facility] OR 08 /  [facility] OR    Anesthesia Start: 1157 Anesthesia Stop: 1232    Procedure: EXTRACORPOREAL SHOCKWAVE LITHOTRIPSY (Right ) Diagnosis:       Kidney stone      (Kidney stone [N20.0])    Surgeons: Nabeel Melendrez MD Provider: Donato Barboza MD    Anesthesia Type: general ASA Status: 3          Anesthesia Type: general    Vitals  Vitals Value Taken Time   /85 07/23/21 1303   Temp 98.7 °F (37.1 °C) 07/23/21 1233   Pulse 61 07/23/21 1303   Resp 10 07/23/21 1303   SpO2 97 % 07/23/21 1303           Post Anesthesia Care and Evaluation    Patient location during evaluation: PACU  Patient participation: complete - patient participated  Level of consciousness: awake  Pain score: 0  Pain management: satisfactory to patient  Airway patency: patent  Anesthetic complications: No anesthetic complications  PONV Status: none  Cardiovascular status: acceptable  Respiratory status: acceptable  Hydration status: acceptable

## 2021-07-23 NOTE — ANESTHESIA PREPROCEDURE EVALUATION
Anesthesia Evaluation     Patient summary reviewed and Nursing notes reviewed   history of anesthetic complications: PONV               Airway   Mallampati: II  TM distance: >3 FB  Dental    (+) edentulous    Pulmonary     breath sounds clear to auscultation  (+) COPD, shortness of breath,   Cardiovascular     Rhythm: regular  Rate: normal    (+) hypertension, CAD, hyperlipidemia,       Neuro/Psych  (+) CVA (no residual, 4 years ago),     GI/Hepatic/Renal/Endo    (+)  PUD,  renal disease, diabetes mellitus,     Musculoskeletal     Abdominal   (+) obese,     Abdomen: soft.   Substance History      OB/GYN          Other                      Anesthesia Plan    ASA 3     general     intravenous induction     Anesthetic plan, all risks, benefits, and alternatives have been provided, discussed and informed consent has been obtained with: patient.    Plan discussed with CRNA.

## 2021-07-23 NOTE — OP NOTE
EXTRACORPOREAL SHOCKWAVE LITHOTRIPSY  Procedure Note    Ye Butler  7/23/2021    Pre-op Diagnosis:   Kidney stone [N20.0]    Post-op Diagnosis:     Post-Op Diagnosis Codes:     * Kidney stone [N20.0]    Procedure/CPT® Codes:  71-year-old white male with 2 right-sided stones with a painful 1.3 cm right UPJ stone for lithotripsy.  He has hydronephrosis he has been to the emergency room.  ESWL-the patient is a candidate for extracorporeal shockwave lithotripsy.  We discussed the type of stone.  And the complications associated with the procedure including but not limited to pain in the flank, hematoma, spontaneous renal hemorrhage, and adequate fragmentation of stones.  The need for passage of the stones.  The need for concomitant additional procedures in the range of 24% the risk of a distal fragment in the range of 3% requiring ureteroscopic removal..  Fact that sometimes a stent is indicated based on the size and the density of the stone as determined on the CAT scan.  Following an informed consent he is brought to the operative suite and underwent induction of general endotracheal anesthetic the stone was localized at F2 and a total of 3000 shockwaves administered without complication.  There was excellent fragmentation he was awake and alert return to recovery room          Procedure(s):  EXTRACORPOREAL SHOCKWAVE LITHOTRIPSY    Surgeon(s):  Nabeel Melendrez MD    Anesthesia: see anesthesia record    Staff:   Circulator: Nataliia Fuentes RN; Ashley Luciano RN  Scrub Person: Steph Parks LPN; Estefania Rainey  Other: Melinda Santana; Jose Fournier    Estimated Blood Loss: none  Urine Voided: * No values recorded between 7/23/2021 12:01 PM and 7/23/2021 12:20 PM *    Specimens:                None      Drains: None    Findings: Excellent fragmentation    Blood: N/A    Complications: None    Grafts and Implants: None    Nabeel Melendrez MD     Date: 7/23/2021  Time: 12:20 EDT

## 2021-07-30 ENCOUNTER — OFFICE VISIT (OUTPATIENT)
Dept: UROLOGY | Facility: CLINIC | Age: 71
End: 2021-07-30

## 2021-07-30 ENCOUNTER — HOSPITAL ENCOUNTER (OUTPATIENT)
Dept: GENERAL RADIOLOGY | Facility: HOSPITAL | Age: 71
Discharge: HOME | End: 2021-07-30
Admitting: UROLOGY

## 2021-07-30 VITALS — BODY MASS INDEX: 29.19 KG/M2 | WEIGHT: 197.09 LBS | HEIGHT: 69 IN

## 2021-07-30 DIAGNOSIS — N20.0 KIDNEY STONE: Primary | ICD-10-CM

## 2021-07-30 PROCEDURE — 99024 POSTOP FOLLOW-UP VISIT: CPT | Performed by: UROLOGY

## 2021-07-30 PROCEDURE — 74018 RADEX ABDOMEN 1 VIEW: CPT | Performed by: RADIOLOGY

## 2021-07-30 PROCEDURE — 74018 RADEX ABDOMEN 1 VIEW: CPT

## 2021-07-30 RX ORDER — HYDROCODONE BITARTRATE AND ACETAMINOPHEN 10; 325 MG/1; MG/1
1 TABLET ORAL EVERY 6 HOURS PRN
Qty: 16 TABLET | Refills: 0 | Status: SHIPPED | OUTPATIENT
Start: 2021-07-30 | End: 2021-09-09

## 2021-09-03 ENCOUNTER — HOSPITAL ENCOUNTER (OUTPATIENT)
Dept: GENERAL RADIOLOGY | Facility: HOSPITAL | Age: 71
Discharge: HOME | End: 2021-09-03
Admitting: UROLOGY

## 2021-09-03 ENCOUNTER — OFFICE VISIT (OUTPATIENT)
Dept: UROLOGY | Facility: CLINIC | Age: 71
End: 2021-09-03

## 2021-09-03 VITALS — BODY MASS INDEX: 29.18 KG/M2 | WEIGHT: 197 LBS | HEIGHT: 69 IN

## 2021-09-03 DIAGNOSIS — N20.0 KIDNEY STONE: Primary | ICD-10-CM

## 2021-09-03 DIAGNOSIS — N20.0 KIDNEY STONE: ICD-10-CM

## 2021-09-03 PROCEDURE — 74018 RADEX ABDOMEN 1 VIEW: CPT

## 2021-09-03 PROCEDURE — 99024 POSTOP FOLLOW-UP VISIT: CPT | Performed by: UROLOGY

## 2021-09-03 PROCEDURE — 74018 RADEX ABDOMEN 1 VIEW: CPT | Performed by: RADIOLOGY

## 2021-09-03 NOTE — PROGRESS NOTES
Chief Complaint:          Chief Complaint   Patient presents with   • Nephrolithiasis       HPI:   71 y.o. male doing well he is a month follow-up no pain.  I will get a KUB I will notify with results when they are available I gave him reassurance otherwise she is done well from my standpoint.  He started with a 1.4 cm stone with only small fragment left he is doing great I will notify him of the results of the KUB when available from my standpoint      Past Medical History:        Past Medical History:   Diagnosis Date   • Appendicitis    • Arthritis    • COPD (chronic obstructive pulmonary disease) (CMS/HCC)    • Coronary artery disease    • COVID-19 2021   • Diabetes mellitus (CMS/HCC)    • Disease of thyroid gland    • Diverticulitis    • Elevated cholesterol    • Emphysema lung (CMS/HCC)    • Emphysema lung (CMS/HCC)    • Emphysema lung (CMS/HCC)    • GERD (gastroesophageal reflux disease)    • Hyperlipidemia    • Hypertension    • Kidney stone    • MI (myocardial infarction) (CMS/[facility])    • PONV (postoperative nausea and vomiting)    • Stomach ulcer    • Stroke (CMS/[facility]) 2018         Current Meds:     Current Outpatient Medications   Medication Sig Dispense Refill   • aspirin 81 MG tablet Take 1 tablet by mouth Daily. 30 tablet 11   • donepezil (ARICEPT) 5 MG tablet Take 1 tablet by mouth Daily.     • famotidine (PEPCID) 40 MG tablet Take 40 mg by mouth every night at bedtime.     • glimepiride (AMARYL) 4 MG tablet Take 1 tablet by mouth Daily.     • HYDROcodone-acetaminophen (Norco)  MG per tablet Take 1 tablet by mouth Every 6 (Six) Hours As Needed for Moderate Pain . 16 tablet 0   • levothyroxine (SYNTHROID, LEVOTHROID) 50 MCG tablet Take 50 mcg by mouth Daily.  0   • lisinopril (PRINIVIL,ZESTRIL) 20 MG tablet Take 1 tablet by mouth 2 (Two) Times a Day. 60 tablet 5   • metFORMIN (GLUCOPHAGE) 500 MG tablet Take 500 mg by mouth 2 (Two) Times a Day With Meals.  0   • metoprolol succinate XL (TOPROL-XL) 25  MG 24 hr tablet Take 1 tablet by mouth Daily. 30 tablet 11   • nitrofurantoin, macrocrystal-monohydrate, (Macrobid) 100 MG capsule Take 1 capsule by mouth Daily. 56 capsule 3   • nitroglycerin (NITROSTAT) 0.4 MG SL tablet 1 under the tongue as needed for angina, may repeat q5mins for up three doses 100 tablet 11   • NON FORMULARY Morphine pump     • omeprazole (priLOSEC) 40 MG capsule Take 40 mg by mouth Daily.     • ondansetron ODT (ZOFRAN-ODT) 4 MG disintegrating tablet Take 1 tablet by mouth 4 (Four) Times a Day. 15 tablet 0   • oxyCODONE-acetaminophen (Percocet)  MG per tablet Take 1 tablet by mouth Every 4 (Four) Hours As Needed for Moderate Pain  (Pain). 12 tablet 0   • Probiotic Product (PROBIOTIC DAILY PO) Take  by mouth Daily.     • Sennosides-Docusate Sodium (SENNA PLUS PO) Take  by mouth 3 (Three) Times a Day.     • sertraline (ZOLOFT) 25 MG tablet Take 1 tablet by mouth Daily.     • simvastatin (ZOCOR) 40 MG tablet Take  by mouth Daily.  0   • tamsulosin (FLOMAX) 0.4 MG capsule 24 hr capsule Take 1 capsule by mouth Daily.     • traMADol (ULTRAM) 50 MG tablet Take 1 tablet by mouth Every 6 (Six) Hours As Needed for Moderate Pain . 60 tablet 2     No current facility-administered medications for this visit.        Allergies:      Allergies   Allergen Reactions   • Claritin [Loratadine] Other (See Comments)     Possible kidney issues   • Codeine Other (See Comments)     Kidney issues         Past Surgical History:     Past Surgical History:   Procedure Laterality Date   • APPENDECTOMY     • CARDIAC CATHETERIZATION     • COLONOSCOPY     • CORONARY STENT PLACEMENT     • ENDOSCOPY     • EXTRACORPOREAL SHOCK WAVE LITHOTRIPSY (ESWL) Right 7/23/2021    Procedure: EXTRACORPOREAL SHOCKWAVE LITHOTRIPSY;  Surgeon: Nabeel Melendrez MD;  Location: [facility];  Service: Urology;  Laterality: Right;   • PAIN PUMP TRIAL     • TURP / TRANSURETHRAL INCISION / DRAINAGE PROSTATE           Social History:      Social History     Socioeconomic History   • Marital status:      Spouse name: Not on file   • Number of children: Not on file   • Years of education: Not on file   • Highest education level: Not on file   Tobacco Use   • Smoking status: Former Smoker     Years: 20.00   • Smokeless tobacco: Never Used   • Tobacco comment: quit 20+ yrs ago    Vaping Use   • Vaping Use: Never used   Substance and Sexual Activity   • Alcohol use: No     Comment: former use 20 + yrs ago    • Drug use: No   • Sexual activity: Defer       Family History:     Family History   Problem Relation Age of Onset   • Cancer Mother    • Heart disease Father    • Stroke Father        Review of Systems:     Review of Systems   Constitutional: Negative.    HENT: Negative.    Eyes: Negative.    Respiratory: Negative.    Cardiovascular: Negative.    Gastrointestinal: Negative.    Endocrine: Negative.    Musculoskeletal: Negative.    Allergic/Immunologic: Negative.    Neurological: Negative.    Hematological: Negative.    Psychiatric/Behavioral: Negative.        Physical Exam:     Physical Exam  Vitals and nursing note reviewed.   Constitutional:       Appearance: He is well-developed.   HENT:      Head: Normocephalic and atraumatic.   Eyes:      Conjunctiva/sclera: Conjunctivae normal.      Pupils: Pupils are equal, round, and reactive to light.   Cardiovascular:      Rate and Rhythm: Normal rate and regular rhythm.      Heart sounds: Normal heart sounds.   Pulmonary:      Effort: Pulmonary effort is normal.      Breath sounds: Normal breath sounds.   Abdominal:      General: Bowel sounds are normal.      Palpations: Abdomen is soft.   Musculoskeletal:         General: Normal range of motion.      Cervical back: Normal range of motion.   Skin:     General: Skin is warm and dry.   Neurological:      Mental Status: He is alert and oriented to person, place, and time.      Deep Tendon Reflexes: Reflexes are normal and symmetric.   Psychiatric:          Behavior: Behavior normal.         Thought Content: Thought content normal.         Judgment: Judgment normal.         I have reviewed the following portions of the patient's history: allergies, current medications, past family history, past medical history, past social history, past surgical history, problem list and ROS and confirm it's accurate.      Procedure:       Assessment/Plan:   Renal calculus-we discussed the presence of the stone we discussed the various therapeutic options available including percutaneous nephrostolithotomy, lithotripsy.  We discussed the risks of lithotripsy including the passage of stones the development of a large string of stones in the distal ureter known as Steinstrasse.  In the 3% incidence of that we will need to proceed with a ureteroscopy for obstructing fragments.  Extremely rare incidence of renal hematoma.  And the significance of this.  We discussed the absolute relative indicators for intervention including the presence of sepsis, and pain we cannot control is the primary need for urgent intervention.  We discussed placement of a stent if indicated and the management of the stent as well.  So far has had a great result from lithotripsy with 1 4 mm fragment in the kidney is completely pain-free                This document has been electronically signed by MARY CASTILLO MD September 3, 2021 15:54 EDT

## 2021-09-09 ENCOUNTER — OFFICE VISIT (OUTPATIENT)
Dept: CARDIOLOGY | Facility: CLINIC | Age: 71
End: 2021-09-09

## 2021-09-09 VITALS
HEART RATE: 70 BPM | WEIGHT: 199 LBS | SYSTOLIC BLOOD PRESSURE: 156 MMHG | OXYGEN SATURATION: 97 % | HEIGHT: 69 IN | DIASTOLIC BLOOD PRESSURE: 89 MMHG | BODY MASS INDEX: 29.47 KG/M2

## 2021-09-09 DIAGNOSIS — R06.02 SOB (SHORTNESS OF BREATH): ICD-10-CM

## 2021-09-09 DIAGNOSIS — I25.10 CORONARY ARTERY DISEASE INVOLVING NATIVE CORONARY ARTERY OF NATIVE HEART WITHOUT ANGINA PECTORIS: Primary | ICD-10-CM

## 2021-09-09 DIAGNOSIS — R00.2 PALPITATIONS: ICD-10-CM

## 2021-09-09 DIAGNOSIS — I10 ESSENTIAL HYPERTENSION: ICD-10-CM

## 2021-09-09 PROCEDURE — 99214 OFFICE O/P EST MOD 30 MIN: CPT | Performed by: PHYSICIAN ASSISTANT

## 2021-09-09 RX ORDER — PRAVASTATIN SODIUM 40 MG
40 TABLET ORAL DAILY
Qty: 30 TABLET | Refills: 5 | Status: SHIPPED | OUTPATIENT
Start: 2021-09-09 | End: 2021-12-06 | Stop reason: SDUPTHER

## 2021-09-09 RX ORDER — AMLODIPINE BESYLATE 5 MG/1
5 TABLET ORAL DAILY
Qty: 30 TABLET | Refills: 11 | Status: SHIPPED | OUTPATIENT
Start: 2021-09-09 | End: 2021-11-29 | Stop reason: SDUPTHER

## 2021-09-09 NOTE — PROGRESS NOTES
Problem list     Subjective   Ye Butler is a 71 y.o. male     Chief Complaint   Patient presents with   • Follow-up     monitor in chart   Problem list  1.  Coronary artery disease  1.1 cardiac catheterization March 2018 because of refractory angina despite negative stress testing demonstrated high-grade RCA disease status post stenting.  Patient underwent stenting ×2 of the circumflex with 70% distal LAD disease and medical management recommended.  1.2 stress test October 2018 demonstrates no evidence of ischemia and preserved LV function  1.3 stress test September 2020 with no evidence of ischemia preserved LV function  2.  Accelerated Idioventricular rhythm  2.1 EKG at primary office demonstrated idioventricular rhythm at 90 bpm.  This was prior to catheterization  2.2 event monitor June 2021 with sinus rhythm and occasional PVCs.  No other arrhythmias noted  3.  Hypertension  4..  Dyslipidemia  5.  Type 2 diabetes mellitus  6.  Family history of premature coronary artery disease and a brother with sudden cardiac death    HPI    Patient is a 71-year-old male who presents back to the office for follow-up.  We follow the patient routinely in the setting of coronary disease.  He has done well.  He does not describe any chest discomfort at all.  He has had mild levels of dyspnea but relates that to his lung disease.  He does not describe any progressive shortness of breath or decline in functional status.  Overall feels well.  He recently was diagnosed with ureteral stones and underwent treatment and is doing better.  He does not describe any PND orthopnea.    Occasionally he can sense a fluttering sensation in his chest that is very mild.  He does not describe any strokelike symptoms.  No complaints of dizziness, presyncope or syncope.  In the past, we stopped his hydrochlorothiazide diuretic and he has dizziness improved.  Otherwise he is doing well            Current Outpatient Medications on File Prior to  Visit   Medication Sig Dispense Refill   • aspirin 81 MG tablet Take 1 tablet by mouth Daily. 30 tablet 11   • donepezil (ARICEPT) 5 MG tablet Take 1 tablet by mouth Daily.     • famotidine (PEPCID) 40 MG tablet Take 40 mg by mouth every night at bedtime.     • glimepiride (AMARYL) 4 MG tablet Take 1 tablet by mouth Daily.     • levothyroxine (SYNTHROID, LEVOTHROID) 50 MCG tablet Take 50 mcg by mouth Daily.  0   • lisinopril (PRINIVIL,ZESTRIL) 20 MG tablet Take 1 tablet by mouth 2 (Two) Times a Day. 60 tablet 5   • metFORMIN (GLUCOPHAGE) 500 MG tablet Take 500 mg by mouth 2 (Two) Times a Day With Meals.  0   • metoprolol succinate XL (TOPROL-XL) 25 MG 24 hr tablet Take 1 tablet by mouth Daily. 30 tablet 11   • nitroglycerin (NITROSTAT) 0.4 MG SL tablet 1 under the tongue as needed for angina, may repeat q5mins for up three doses 100 tablet 11   • NON FORMULARY Morphine pump     • omeprazole (priLOSEC) 40 MG capsule Take 40 mg by mouth Daily.     • ondansetron ODT (ZOFRAN-ODT) 4 MG disintegrating tablet Take 1 tablet by mouth 4 (Four) Times a Day. 15 tablet 0   • Probiotic Product (PROBIOTIC DAILY PO) Take  by mouth Daily.     • Sennosides-Docusate Sodium (SENNA PLUS PO) Take  by mouth 3 (Three) Times a Day.     • sertraline (ZOLOFT) 25 MG tablet Take 1 tablet by mouth Daily.     • tamsulosin (FLOMAX) 0.4 MG capsule 24 hr capsule Take 1 capsule by mouth Daily.     • traMADol (ULTRAM) 50 MG tablet Take 1 tablet by mouth Every 6 (Six) Hours As Needed for Moderate Pain . 60 tablet 2   • [DISCONTINUED] simvastatin (ZOCOR) 40 MG tablet Take  by mouth Daily.  0   • [DISCONTINUED] HYDROcodone-acetaminophen (Norco)  MG per tablet Take 1 tablet by mouth Every 6 (Six) Hours As Needed for Moderate Pain . 16 tablet 0   • [DISCONTINUED] nitrofurantoin, macrocrystal-monohydrate, (Macrobid) 100 MG capsule Take 1 capsule by mouth Daily. 56 capsule 3   • [DISCONTINUED] oxyCODONE-acetaminophen (Percocet)  MG per tablet  Take 1 tablet by mouth Every 4 (Four) Hours As Needed for Moderate Pain  (Pain). 12 tablet 0     No current facility-administered medications on file prior to visit.       Claritin [loratadine] and Codeine    Past Medical History:   Diagnosis Date   • Appendicitis    • Arthritis    • COPD (chronic obstructive pulmonary disease) (CMS/[facility])    • Coronary artery disease    • COVID-19 2021   • Diabetes mellitus (CMS/[facility])    • Disease of thyroid gland    • Diverticulitis    • Elevated cholesterol    • Emphysema lung (CMS/[facility])    • Emphysema lung (CMS/HCC)    • Emphysema lung (CMS/[facility])    • GERD (gastroesophageal reflux disease)    • Hyperlipidemia    • Hypertension    • Kidney stone    • MI (myocardial infarction) (CMS/[facility])    • PONV (postoperative nausea and vomiting)    • Stomach ulcer    • Stroke (CMS/[facility]) 2018       Social History     Socioeconomic History   • Marital status:      Spouse name: Not on file   • Number of children: Not on file   • Years of education: Not on file   • Highest education level: Not on file   Tobacco Use   • Smoking status: Former Smoker     Years: 20.00   • Smokeless tobacco: Never Used   • Tobacco comment: quit 20+ yrs ago    Vaping Use   • Vaping Use: Never used   Substance and Sexual Activity   • Alcohol use: No     Comment: former use 20 + yrs ago    • Drug use: No   • Sexual activity: Defer       Family History   Problem Relation Age of Onset   • Cancer Mother    • Heart disease Father    • Stroke Father        Review of Systems   Constitutional: Positive for fatigue (at times). Negative for chills and fever.   HENT: Positive for sinus pressure. Negative for congestion and sore throat.    Eyes: Positive for visual disturbance (readers).   Respiratory: Positive for shortness of breath. Negative for chest tightness.    Cardiovascular: Negative.  Negative for chest pain, palpitations and leg swelling.   Gastrointestinal: Positive for nausea (at times). Negative for abdominal pain,  "blood in stool, constipation, diarrhea and vomiting.   Endocrine: Negative.  Negative for cold intolerance and heat intolerance.   Genitourinary: Negative.  Negative for dysuria, frequency, hematuria and urgency.   Musculoskeletal: Positive for arthralgias and back pain. Negative for neck pain.   Skin: Positive for wound. Negative for rash.   Allergic/Immunologic: Positive for environmental allergies.   Neurological: Positive for dizziness (rarely). Negative for syncope and light-headedness.   Hematological: Bruises/bleeds easily.   Psychiatric/Behavioral: Positive for sleep disturbance (wakes with soarachel cp).       Objective   Vitals:    09/09/21 0815   BP: 156/89   BP Location: Left arm   Patient Position: Sitting   Pulse: 70   SpO2: 97%   Weight: 90.3 kg (199 lb)   Height: 175.3 cm (69\")      /89 (BP Location: Left arm, Patient Position: Sitting)   Pulse 70   Ht 175.3 cm (69\")   Wt 90.3 kg (199 lb)   SpO2 97%   BMI 29.39 kg/m²     Lab Results (most recent)     None          Physical Exam  Vitals and nursing note reviewed.   Constitutional:       General: He is not in acute distress.     Appearance: Normal appearance. He is well-developed.   HENT:      Head: Normocephalic and atraumatic.   Eyes:      General: No scleral icterus.        Right eye: No discharge.         Left eye: No discharge.      Conjunctiva/sclera: Conjunctivae normal.   Neck:      Vascular: No carotid bruit.   Cardiovascular:      Rate and Rhythm: Normal rate and regular rhythm.      Heart sounds: Normal heart sounds. No murmur heard.   No friction rub. No gallop.    Pulmonary:      Effort: Pulmonary effort is normal. No respiratory distress.      Breath sounds: Normal breath sounds. No wheezing or rales.   Chest:      Chest wall: No tenderness.   Musculoskeletal:      Right lower leg: No edema.      Left lower leg: No edema.   Skin:     General: Skin is warm and dry.      Coloration: Skin is not pale.      Findings: No erythema " or rash.   Neurological:      Mental Status: He is alert and oriented to person, place, and time.     Procedure      Cranial Nerves: No cranial nerve deficit.   Psychiatric:         Behavior: Behavior normal.       Procedures       Assessment/Plan     Problems Addressed this Visit        Cardiac and Vasculature    Essential hypertension    Relevant Medications    amLODIPine (NORVASC) 5 MG tablet    Palpitations    Coronary artery disease involving native coronary artery of native heart without angina pectoris - Primary    Relevant Medications    amLODIPine (NORVASC) 5 MG tablet       Pulmonary and Pneumonias    SOB (shortness of breath)      Diagnoses       Codes Comments    Coronary artery disease involving native coronary artery of native heart without angina pectoris    -  Primary ICD-10-CM: I25.10  ICD-9-CM: 414.01     SOB (shortness of breath)     ICD-10-CM: R06.02  ICD-9-CM: 786.05     Essential hypertension     ICD-10-CM: I10  ICD-9-CM: 401.9     Palpitations     ICD-10-CM: R00.2  ICD-9-CM: 785.1           Recommendation  1.  Patient is a 71-year-old male who presents back to the office for follow-up.  He has history of coronary disease with no ischemic symptoms and for now we will continue medical therapy    2.  He has a degree of uncontrolled hypertension.  I am adding amlodipine to his blood pressure regimen.  Hopefully this will not exacerbate dizziness or orthostasis but if it does, we will stop that medication.  Because of drug drug interaction with simvastatin, I am stopping simvastatin and transitioning to pravastatin.  I have discussed that with the patient.  If he has any issues with change of medication in regards to adverse effects, he is to call our office.    3.  Minimal palpitations with occasional PVCs on event monitor but no malignant arrhythmia.  For now we will monitor.  We will continue the same and see him back for follow-up in 6 months or sooner as symptoms discussed.  Follow-up with  primary as scheduled        Patient did not bring med list or medicine bottles to appointment, med list has been reviewed and updated based on patient's knowledge of their meds.     Advance Care Planning   ACP discussion was held with the patient during this visit. Patient does not have an advance directive, declines further assistance.      Electronically signed by:

## 2021-09-09 NOTE — PATIENT INSTRUCTIONS

## 2021-11-29 RX ORDER — AMLODIPINE BESYLATE 5 MG/1
5 TABLET ORAL DAILY
Qty: 30 TABLET | Refills: 5 | Status: SHIPPED | OUTPATIENT
Start: 2021-11-29 | End: 2022-09-08 | Stop reason: ALTCHOICE

## 2021-12-06 ENCOUNTER — TELEPHONE (OUTPATIENT)
Dept: CARDIOLOGY | Facility: CLINIC | Age: 71
End: 2021-12-06

## 2021-12-06 RX ORDER — PRAVASTATIN SODIUM 40 MG
40 TABLET ORAL DAILY
Qty: 90 TABLET | Refills: 3 | Status: SHIPPED | OUTPATIENT
Start: 2021-12-06 | End: 2022-09-08 | Stop reason: ALTCHOICE

## 2021-12-07 NOTE — TELEPHONE ENCOUNTER
error   38 year old female, fell down (4) steps, denies head trauma , LOC, C spine tenderness. C/O pain to R lower extremity, including mid tib/fib, ankle over medial malleolus and dorsal aspect of R foot. X-ray reveals...... 38 year old female, fell down (4) steps, denies head trauma , LOC, C spine tenderness. C/O pain to R lower extremity, including mid tib/fib, ankle over medial malleolus and dorsal aspect of R foot. X-ray reveals proximal fibula fracture and foot fracture. Splint placed and orthopedist follow up recommended. Patient to remain non weight-bearing with the aide of crutches.

## 2022-03-08 ENCOUNTER — OFFICE VISIT (OUTPATIENT)
Dept: CARDIOLOGY | Facility: CLINIC | Age: 72
End: 2022-03-08

## 2022-03-08 VITALS
OXYGEN SATURATION: 97 % | WEIGHT: 201 LBS | BODY MASS INDEX: 29.77 KG/M2 | HEIGHT: 69 IN | DIASTOLIC BLOOD PRESSURE: 87 MMHG | HEART RATE: 86 BPM | SYSTOLIC BLOOD PRESSURE: 150 MMHG

## 2022-03-08 DIAGNOSIS — R06.02 SOB (SHORTNESS OF BREATH): ICD-10-CM

## 2022-03-08 DIAGNOSIS — I10 ESSENTIAL HYPERTENSION: ICD-10-CM

## 2022-03-08 DIAGNOSIS — I25.10 CORONARY ARTERY DISEASE INVOLVING NATIVE CORONARY ARTERY OF NATIVE HEART WITHOUT ANGINA PECTORIS: Primary | ICD-10-CM

## 2022-03-08 PROBLEM — R42 VERTIGO: Status: ACTIVE | Noted: 2022-03-08

## 2022-03-08 PROBLEM — H93.13 BILATERAL TINNITUS: Status: ACTIVE | Noted: 2022-03-08

## 2022-03-08 PROBLEM — H91.93 BILATERAL HEARING LOSS: Status: ACTIVE | Noted: 2022-03-08

## 2022-03-08 PROCEDURE — 99213 OFFICE O/P EST LOW 20 MIN: CPT | Performed by: PHYSICIAN ASSISTANT

## 2022-03-08 RX ORDER — PANTOPRAZOLE SODIUM 40 MG/1
40 TABLET, DELAYED RELEASE ORAL 2 TIMES DAILY
COMMUNITY
Start: 2022-03-04 | End: 2022-09-08 | Stop reason: ALTCHOICE

## 2022-03-08 RX ORDER — AMOXICILLIN 500 MG/1
500 CAPSULE ORAL 2 TIMES DAILY
COMMUNITY
Start: 2022-03-04 | End: 2022-09-08 | Stop reason: ALTCHOICE

## 2022-03-08 RX ORDER — CLARITHROMYCIN 500 MG/1
500 TABLET, COATED ORAL 2 TIMES DAILY
COMMUNITY
Start: 2022-03-04

## 2022-03-08 RX ORDER — MONTELUKAST SODIUM 10 MG/1
10 TABLET ORAL NIGHTLY
COMMUNITY

## 2022-03-08 RX ORDER — MEMANTINE HYDROCHLORIDE 5 MG/1
5 TABLET ORAL DAILY
COMMUNITY

## 2022-03-08 RX ORDER — CEPHALEXIN 500 MG/1
CAPSULE ORAL
COMMUNITY
Start: 2022-03-01 | End: 2022-03-08

## 2022-03-08 NOTE — PROGRESS NOTES
Problem list     Subjective   Ye Butler is a 72 y.o. male     Chief Complaint   Patient presents with   • Coronary Artery Disease   • Hypertension   Problem list  1.  Coronary artery disease  1.1 cardiac catheterization March 2018 because of refractory angina despite negative stress testing demonstrated high-grade RCA disease status post stenting.  Patient underwent stenting ×2 of the circumflex with 70% distal LAD disease and medical management recommended.  1.2 stress test October 2018 demonstrates no evidence of ischemia and preserved LV function  1.3 stress test September 2020 with no evidence of ischemia preserved LV function  2.  Accelerated Idioventricular rhythm  2.1 EKG at primary office demonstrated idioventricular rhythm at 90 bpm.  This was prior to catheterization  2.2 event monitor June 2021 with sinus rhythm and occasional PVCs.  No other arrhythmias noted  3.  Hypertension  4..  Dyslipidemia  5.  Type 2 diabetes mellitus  6.  Family history of premature coronary artery disease and a brother with sudden cardiac death    HPI    Patient is a 72-year-old male who presents back to the office for routine follow-up    As above, he has history of coronary disease.  He has done remarkably well since his stenting procedures.  Most recent stress test in September 2020 with no evidence of ischemia    Patient describes feeling remarkably well without any chest discomfort at all.  He can experience mild levels of dyspnea but this is more of a chronic shortness of breath.  He does not describe any progressive dyspnea at all.  His functional status appears to be relatively stable.  He does not describe any PND, orthopnea or significant edema    He does not note any palpitations.  He does not describe any dizziness, presyncope or syncope.  He does not describe any strokelike symptoms.  Patient describes feeling remarkably well at this point    Current Outpatient Medications on File Prior to Visit   Medication  Sig Dispense Refill   • amLODIPine (NORVASC) 5 MG tablet Take 1 tablet by mouth Daily. 30 tablet 5   • amoxicillin (AMOXIL) 500 MG capsule 500 mg 2 (Two) Times a Day.     • aspirin 81 MG tablet Take 1 tablet by mouth Daily. 30 tablet 11   • clarithromycin (BIAXIN) 500 MG tablet Take 500 mg by mouth 2 (Two) Times a Day.     • glimepiride (AMARYL) 4 MG tablet Take 1 tablet by mouth Daily.     • levothyroxine (SYNTHROID, LEVOTHROID) 50 MCG tablet Take 50 mcg by mouth Daily.  0   • lisinopril (PRINIVIL,ZESTRIL) 20 MG tablet Take 1 tablet by mouth 2 (Two) Times a Day. 60 tablet 5   • memantine (NAMENDA) 5 MG tablet Take 5 mg by mouth Daily.     • metFORMIN (GLUCOPHAGE) 500 MG tablet Take 500 mg by mouth 2 (Two) Times a Day With Meals.  0   • metoprolol succinate XL (TOPROL-XL) 25 MG 24 hr tablet Take 1 tablet by mouth Daily. 30 tablet 11   • montelukast (SINGULAIR) 10 MG tablet Take 10 mg by mouth Every Night.     • nitroglycerin (NITROSTAT) 0.4 MG SL tablet 1 under the tongue as needed for angina, may repeat q5mins for up three doses 100 tablet 11   • NON FORMULARY Morphine pump     • pantoprazole (PROTONIX) 40 MG EC tablet Take 40 mg by mouth 2 (Two) Times a Day.     • pravastatin (Pravachol) 40 MG tablet Take 1 tablet by mouth Daily. 90 tablet 3   • Sennosides-Docusate Sodium (SENNA PLUS PO) Take  by mouth 3 (Three) Times a Day.     • sertraline (ZOLOFT) 25 MG tablet Take 1 tablet by mouth Daily.     • [DISCONTINUED] cephalexin (KEFLEX) 500 MG capsule      • [DISCONTINUED] donepezil (ARICEPT) 5 MG tablet Take 1 tablet by mouth Daily.     • [DISCONTINUED] famotidine (PEPCID) 40 MG tablet Take 40 mg by mouth every night at bedtime.     • [DISCONTINUED] omeprazole (priLOSEC) 40 MG capsule Take 40 mg by mouth Daily.     • [DISCONTINUED] ondansetron ODT (ZOFRAN-ODT) 4 MG disintegrating tablet Take 1 tablet by mouth 4 (Four) Times a Day. 15 tablet 0   • [DISCONTINUED] Probiotic Product (PROBIOTIC DAILY PO) Take  by mouth  Daily.     • [DISCONTINUED] tamsulosin (FLOMAX) 0.4 MG capsule 24 hr capsule Take 1 capsule by mouth Daily.     • [DISCONTINUED] traMADol (ULTRAM) 50 MG tablet Take 1 tablet by mouth Every 6 (Six) Hours As Needed for Moderate Pain . 60 tablet 2     No current facility-administered medications on file prior to visit.       Claritin [loratadine] and Codeine    Past Medical History:   Diagnosis Date   • Appendicitis    • Arthritis    • COPD (chronic obstructive pulmonary disease) (HCC)    • Coronary artery disease    • COVID-19 2021   • Diabetes mellitus (HCC)    • Disease of thyroid gland    • Diverticulitis    • Elevated cholesterol    • Emphysema lung (HCC)    • Emphysema lung (HCC)    • Emphysema lung (HCC)    • GERD (gastroesophageal reflux disease)    • Hyperlipidemia    • Hypertension    • Kidney stone    • MI (myocardial infarction) (HCC)    • PONV (postoperative nausea and vomiting)    • Stomach ulcer    • Stroke (HCC) 2018       Social History     Socioeconomic History   • Marital status:    Tobacco Use   • Smoking status: Former Smoker     Years: 20.00   • Smokeless tobacco: Never Used   • Tobacco comment: quit 20+ yrs ago    Vaping Use   • Vaping Use: Never used   Substance and Sexual Activity   • Alcohol use: No     Comment: former use 20 + yrs ago    • Drug use: No   • Sexual activity: Defer       Family History   Problem Relation Age of Onset   • Cancer Mother    • Heart disease Father    • Stroke Father        Review of Systems   Constitutional: Negative.  Negative for chills and fever.   HENT: Negative.  Negative for congestion and sinus pressure.    Respiratory: Positive for shortness of breath (emphysema). Negative for chest tightness.    Cardiovascular: Negative.  Negative for chest pain, palpitations and leg swelling.   Gastrointestinal: Negative.  Negative for abdominal pain, blood in stool, constipation, diarrhea, nausea and vomiting.   Endocrine: Negative.  Negative for cold intolerance.  "  Genitourinary: Negative.  Negative for dysuria, frequency, hematuria and urgency.   Neurological: Positive for dizziness (if standing too quickly). Negative for syncope and light-headedness.   Psychiatric/Behavioral: Negative.  Negative for sleep disturbance (denies waking with soa or cp).       Objective   Vitals:    03/08/22 0820   BP: 150/87   BP Location: Left arm   Patient Position: Sitting   Pulse: 86   SpO2: 97%   Weight: 91.2 kg (201 lb)   Height: 175.3 cm (69\")      /87 (BP Location: Left arm, Patient Position: Sitting)   Pulse 86   Ht 175.3 cm (69\")   Wt 91.2 kg (201 lb)   SpO2 97%   BMI 29.68 kg/m²     Lab Results (most recent)     None          Physical Exam  Vitals and nursing note reviewed.   Constitutional:       General: He is not in acute distress.     Appearance: Normal appearance. He is well-developed.   HENT:      Head: Normocephalic and atraumatic.   Eyes:      General: No scleral icterus.        Right eye: No discharge.         Left eye: No discharge.      Conjunctiva/sclera: Conjunctivae normal.   Neck:      Vascular: No carotid bruit.   Cardiovascular:      Rate and Rhythm: Normal rate and regular rhythm.      Heart sounds: Normal heart sounds. No murmur heard.    No friction rub. No gallop.   Pulmonary:      Effort: Pulmonary effort is normal. No respiratory distress.      Breath sounds: Normal breath sounds. No wheezing or rales.   Chest:      Chest wall: No tenderness.   Musculoskeletal:      Right lower leg: No edema.      Left lower leg: No edema.   Skin:     General: Skin is warm and dry.      Coloration: Skin is not pale.      Findings: No erythema or rash.   Neurological:      Mental Status: He is alert and oriented to person, place, and time.      Cranial Nerves: No cranial nerve deficit.   Psychiatric:         Behavior: Behavior normal.         Procedure   Procedures       Assessment/Plan     Problems Addressed this Visit        Cardiac and Vasculature    Essential " hypertension    Coronary artery disease involving native coronary artery of native heart without angina pectoris - Primary       Pulmonary and Pneumonias    SOB (shortness of breath)      Diagnoses       Codes Comments    Coronary artery disease involving native coronary artery of native heart without angina pectoris    -  Primary ICD-10-CM: I25.10  ICD-9-CM: 414.01     SOB (shortness of breath)     ICD-10-CM: R06.02  ICD-9-CM: 786.05     Essential hypertension     ICD-10-CM: I10  ICD-9-CM: 401.9         Recommendation  1.  Patient is a 72-year-old male that has history of coronary disease and is doing remarkably well at this point without any ischemic or failure symptoms.  For now we will continue to monitor    2.  Patient with dyslipidemia and diabetes mellitus.  Labs have been monitored routinely by his primary and according to the patient, his diabetes is doing remarkable.  I do not have recent laboratories.  They are monitoring lipids as well    3.  Blood pressure has been consistently elevated here but he may have a degree of whitecoat hypertension.  For now we will monitor but may need to consider increasing his antihypertensive regimen if this continues to be the case    4.  Otherwise dyspnea is mild.  He does not describe any progressive dyspnea to suggest ischemia or failure is contributing factor.  Overall he is doing well.  We will see him back for follow-up in 6 months or sooner as symptoms discussed.  Follow-up with primary as scheduled           Patient brought in medicine list to appointment, it's been reviewed with patient and med list was updated in the chart.     Advance Care Planning   ACP discussion was held with the patient during this visit. Patient does not have an advance directive, declines further assistance.      Electronically signed by:

## 2022-09-08 ENCOUNTER — OFFICE VISIT (OUTPATIENT)
Dept: CARDIOLOGY | Facility: CLINIC | Age: 72
End: 2022-09-08

## 2022-09-08 VITALS
HEIGHT: 69 IN | WEIGHT: 204 LBS | DIASTOLIC BLOOD PRESSURE: 84 MMHG | BODY MASS INDEX: 30.21 KG/M2 | OXYGEN SATURATION: 98 % | HEART RATE: 58 BPM | SYSTOLIC BLOOD PRESSURE: 131 MMHG

## 2022-09-08 DIAGNOSIS — I25.10 CORONARY ARTERY DISEASE INVOLVING NATIVE CORONARY ARTERY OF NATIVE HEART WITHOUT ANGINA PECTORIS: Primary | ICD-10-CM

## 2022-09-08 DIAGNOSIS — I10 ESSENTIAL HYPERTENSION: ICD-10-CM

## 2022-09-08 DIAGNOSIS — E78.5 DYSLIPIDEMIA: ICD-10-CM

## 2022-09-08 PROCEDURE — 99213 OFFICE O/P EST LOW 20 MIN: CPT | Performed by: PHYSICIAN ASSISTANT

## 2022-09-08 PROCEDURE — 93000 ELECTROCARDIOGRAM COMPLETE: CPT | Performed by: PHYSICIAN ASSISTANT

## 2022-09-08 RX ORDER — ASPIRIN 81 MG
100 TABLET, DELAYED RELEASE (ENTERIC COATED) ORAL AS NEEDED
COMMUNITY

## 2022-09-08 RX ORDER — PRAVASTATIN SODIUM 40 MG
40 TABLET ORAL NIGHTLY
COMMUNITY
End: 2022-12-05

## 2022-09-08 RX ORDER — LINACLOTIDE 145 UG/1
CAPSULE, GELATIN COATED ORAL
COMMUNITY
Start: 2022-08-29

## 2022-09-08 RX ORDER — TAMSULOSIN HYDROCHLORIDE 0.4 MG/1
1 CAPSULE ORAL DAILY
COMMUNITY
Start: 2022-08-29

## 2022-09-08 RX ORDER — TESTOSTERONE CYPIONATE 200 MG/ML
200 INJECTION, SOLUTION INTRAMUSCULAR
COMMUNITY
Start: 2022-08-18

## 2022-09-08 RX ORDER — FAMOTIDINE 40 MG/1
40 TABLET, FILM COATED ORAL DAILY
COMMUNITY

## 2022-09-08 RX ORDER — AMLODIPINE BESYLATE 2.5 MG/1
2.5 TABLET ORAL NIGHTLY
COMMUNITY
Start: 2022-08-20

## 2022-09-08 NOTE — PROGRESS NOTES
Problem list     Subjective   Ye Butler is a 73 y.o. male     Chief Complaint   Patient presents with   • Coronary Artery Disease   Problem list  1.  Coronary artery disease  1.1 cardiac catheterization March 2018 because of refractory angina despite negative stress testing demonstrated high-grade RCA disease status post stenting.  Patient underwent stenting ×2 of the circumflex with 70% distal LAD disease and medical management recommended.  1.2 stress test October 2018 demonstrates no evidence of ischemia and preserved LV function  1.3 stress test September 2020 with no evidence of ischemia preserved LV function  2.  Accelerated Idioventricular rhythm  2.1 EKG at primary office demonstrated idioventricular rhythm at 90 bpm.  This was prior to catheterization  2.2 event monitor June 2021 with sinus rhythm and occasional PVCs.  No other arrhythmias noted  3.  Hypertension  4..  Dyslipidemia  5.  Type 2 diabetes mellitus  6.  Family history of premature coronary artery disease and a brother with sudden cardiac death    HPI    Patient is a 73-year-old male who presents back to the office for follow-up.    Patient has felt remarkably well since his last visit from a cardiac standpoint.  He does not describe any chest pain.  He can experience mild dyspnea but it is not something that has been progressive.  He does not describe PND orthopnea.    He does not describe palpitating nor does he complain of any dysrhythmic symptoms.  He appears remarkably well from a cardiac standpoint.              Current Outpatient Medications on File Prior to Visit   Medication Sig Dispense Refill   • amLODIPine (NORVASC) 2.5 MG tablet 2.5 mg Every Night.     • aspirin 81 MG tablet Take 1 tablet by mouth Daily. 30 tablet 11   • Docusate Sodium (Stool Softener) 100 MG capsule Take 100 mg by mouth As Needed for Constipation.     • famotidine (PEPCID) 40 MG tablet Take 40 mg by mouth Daily.     • glimepiride (AMARYL) 4 MG tablet Take 1  tablet by mouth Daily.     • levothyroxine (SYNTHROID, LEVOTHROID) 50 MCG tablet Take 50 mcg by mouth Daily.  0   • lisinopril (PRINIVIL,ZESTRIL) 20 MG tablet Take 1 tablet by mouth 2 (Two) Times a Day. 60 tablet 5   • memantine (NAMENDA) 5 MG tablet Take 5 mg by mouth Daily.     • metFORMIN (GLUCOPHAGE) 500 MG tablet Take 500 mg by mouth 2 (Two) Times a Day With Meals.  0   • metoprolol tartrate (LOPRESSOR) 25 MG tablet 25 mg 2 (Two) Times a Day.     • montelukast (SINGULAIR) 10 MG tablet Take 10 mg by mouth Every Night.     • nitroglycerin (NITROSTAT) 0.4 MG SL tablet 1 under the tongue as needed for angina, may repeat q5mins for up three doses 100 tablet 11   • NON FORMULARY Morphine pump     • OMEPRAZOLE PO Take 40 mg by mouth Daily.     • pravastatin (PRAVACHOL) 40 MG tablet Take 40 mg by mouth Every Night.     • sertraline (ZOLOFT) 25 MG tablet Take 1 tablet by mouth Daily.     • tamsulosin (FLOMAX) 0.4 MG capsule 24 hr capsule 1 capsule Daily.     • Testosterone Cypionate (DEPOTESTOTERONE CYPIONATE) 200 MG/ML injection Inject 200 mg into the appropriate muscle as directed by prescriber Every 21 (Twenty-One) Days.     • clarithromycin (BIAXIN) 500 MG tablet Take 500 mg by mouth 2 (Two) Times a Day.     • Linzess 145 MCG capsule capsule      • [DISCONTINUED] amLODIPine (NORVASC) 5 MG tablet Take 1 tablet by mouth Daily. 30 tablet 5   • [DISCONTINUED] amoxicillin (AMOXIL) 500 MG capsule 500 mg 2 (Two) Times a Day.     • [DISCONTINUED] metoprolol succinate XL (TOPROL-XL) 25 MG 24 hr tablet Take 1 tablet by mouth Daily. 30 tablet 11   • [DISCONTINUED] pantoprazole (PROTONIX) 40 MG EC tablet Take 40 mg by mouth 2 (Two) Times a Day.     • [DISCONTINUED] pravastatin (Pravachol) 40 MG tablet Take 1 tablet by mouth Daily. (Patient taking differently: Take 40 mg by mouth Every Night.) 90 tablet 3   • [DISCONTINUED] Sennosides-Docusate Sodium (SENNA PLUS PO) Take  by mouth 3 (Three) Times a Day.       No current  facility-administered medications on file prior to visit.       Claritin [loratadine] and Codeine    Past Medical History:   Diagnosis Date   • Appendicitis    • Arthritis    • COPD (chronic obstructive pulmonary disease) (HCC)    • Coronary artery disease    • COVID-19 2021   • Diabetes mellitus (HCC)    • Disease of thyroid gland    • Diverticulitis    • Elevated cholesterol    • Emphysema lung (HCC)    • Emphysema lung (HCC)    • Emphysema lung (HCC)    • GERD (gastroesophageal reflux disease)    • Hyperlipidemia    • Hypertension    • Kidney stone    • MI (myocardial infarction) (HCC)    • PONV (postoperative nausea and vomiting)    • Stomach ulcer    • Stroke (HCC) 2018       Social History     Socioeconomic History   • Marital status:    Tobacco Use   • Smoking status: Former Smoker     Years: 20.00   • Smokeless tobacco: Never Used   • Tobacco comment: quit 20+ yrs ago    Vaping Use   • Vaping Use: Never used   Substance and Sexual Activity   • Alcohol use: No     Comment: former use 20 + yrs ago    • Drug use: No   • Sexual activity: Defer       Family History   Problem Relation Age of Onset   • Cancer Mother    • Heart disease Father    • Stroke Father        Review of Systems   Constitutional: Negative.  Negative for chills and fever.   HENT: Negative.  Negative for congestion and sinus pressure.    Respiratory: Positive for shortness of breath (at times). Negative for chest tightness.    Cardiovascular: Negative.  Negative for chest pain, palpitations and leg swelling.   Gastrointestinal: Negative.  Negative for abdominal pain, blood in stool, constipation, diarrhea, nausea and vomiting.   Endocrine: Negative.  Negative for cold intolerance and heat intolerance.   Genitourinary: Negative.  Negative for dysuria, frequency, hematuria and urgency.   Neurological: Positive for dizziness (with position change at times). Negative for syncope and light-headedness.   Psychiatric/Behavioral: Negative.   "Negative for sleep disturbance (denies waking with soa or cp).       Objective   Vitals:    09/08/22 0842   BP: 131/84   BP Location: Left arm   Patient Position: Sitting   Pulse: 58   SpO2: 98%   Weight: 92.5 kg (204 lb)   Height: 175.3 cm (69\")      /84 (BP Location: Left arm, Patient Position: Sitting)   Pulse 58   Ht 175.3 cm (69\")   Wt 92.5 kg (204 lb)   SpO2 98%   BMI 30.13 kg/m²     Lab Results (most recent)     None          Physical Exam  Vitals and nursing note reviewed.   Constitutional:       General: He is not in acute distress.     Appearance: Normal appearance. He is well-developed.   HENT:      Head: Normocephalic and atraumatic.   Eyes:      General: No scleral icterus.        Right eye: No discharge.         Left eye: No discharge.      Conjunctiva/sclera: Conjunctivae normal.   Neck:      Vascular: No carotid bruit.   Cardiovascular:      Rate and Rhythm: Normal rate and regular rhythm.      Heart sounds: Normal heart sounds. No murmur heard.    No friction rub. No gallop.   Pulmonary:      Effort: Pulmonary effort is normal. No respiratory distress.      Breath sounds: Normal breath sounds. No wheezing or rales.   Chest:      Chest wall: No tenderness.   Musculoskeletal:      Right lower leg: No edema.      Left lower leg: No edema.   Skin:     General: Skin is warm and dry.      Coloration: Skin is not pale.      Findings: No erythema or rash.   Neurological:      Mental Status: He is alert and oriented to person, place, and time.      Cranial Nerves: No cranial nerve deficit.   Psychiatric:         Behavior: Behavior normal.         Procedure     ECG 12 Lead    Date/Time: 9/8/2022 8:52 AM  Performed by: Slade Stanley PA  Authorized by: Slade Stanley PA   Comparison: compared with previous ECG from 2/13/2020  Comments: EKG demonstrates sinus bradycardia at 52 bpm with no acute ST changes               Assessment & Plan     Problems Addressed this Visit        Cardiac and " Vasculature    Essential hypertension    Relevant Medications    metoprolol tartrate (LOPRESSOR) 25 MG tablet    amLODIPine (NORVASC) 2.5 MG tablet    Coronary artery disease involving native coronary artery of native heart without angina pectoris - Primary    Relevant Medications    metoprolol tartrate (LOPRESSOR) 25 MG tablet    amLODIPine (NORVASC) 2.5 MG tablet    Dyslipidemia      Diagnoses       Codes Comments    Coronary artery disease involving native coronary artery of native heart without angina pectoris    -  Primary ICD-10-CM: I25.10  ICD-9-CM: 414.01     Essential hypertension     ICD-10-CM: I10  ICD-9-CM: 401.9     Dyslipidemia     ICD-10-CM: E78.5  ICD-9-CM: 272.4         Recommendation  1.  Patient is a 73-year-old male who presents to the office for follow-up with history of coronary disease.  Patient feels remarkably well without angina.  For now, I feel patient is doing well.  We will continue medications    2.  Patient's blood pressure controlled on current medical regimen and for now, we will make no changes but will refill medications    3.  Patient with dyslipidemia currently on statin therapy.  The last laboratories in regards to lipids, looked relatively controlled.  We can try to receive an updated laboratory panel in this patient.  This is monitored by primary.    4.  For now, we will make no changes and see him back for follow-up in 6 months or sooner as symptoms discussed.  He is to follow with primary as scheduled             Ye Butler  reports that he has quit smoking. He quit after 20.00 years of use. He has never used smokeless tobacco..     Patient brought in medicine list to appointment, it's been reviewed with patient and med list was updated in the chart.     Advance Care Planning   ACP discussion was held with the patient during this visit. Patient does not have an advance directive, declines further assistance.       Electronically signed by:

## 2022-12-05 RX ORDER — PRAVASTATIN SODIUM 40 MG
TABLET ORAL
Qty: 90 TABLET | Refills: 3 | Status: SHIPPED | OUTPATIENT
Start: 2022-12-05

## 2022-12-05 NOTE — TELEPHONE ENCOUNTER
Rx Refill Note  Requested Prescriptions     Pending Prescriptions Disp Refills   • pravastatin (PRAVACHOL) 40 MG tablet [Pharmacy Med Name: PRAVASTATIN SODIUM 40 MG TAB] 90 tablet      Sig: TAKE ONE TABLET BY MOUTH DAILY      Last office visit with prescribing clinician: 9/8/2022   Last telemedicine visit with prescribing clinician: 3/14/2023   Next office visit with prescribing clinician: 3/14/2023                         Would you like a call back once the refill request has been completed: [] Yes [] No    If the office needs to give you a call back, can they leave a voicemail: [] Yes [] No    Linda Cloud LPN  12/05/22, 10:42 EST     [facility]-CoA Reductase Inhibitors (Statins) Protocol Failed 12/03/2022 06:51 AM   Protocol Details  Lipid panel in past 12 months    ALK [facility] in past 12 months    ALT in past 12 months    AST in past 12 months

## 2023-03-14 ENCOUNTER — OFFICE VISIT (OUTPATIENT)
Dept: CARDIOLOGY | Facility: CLINIC | Age: 73
End: 2023-03-14
Payer: MEDICARE

## 2023-03-14 VITALS
DIASTOLIC BLOOD PRESSURE: 87 MMHG | WEIGHT: 207.2 LBS | OXYGEN SATURATION: 99 % | HEART RATE: 63 BPM | HEIGHT: 69 IN | SYSTOLIC BLOOD PRESSURE: 157 MMHG | BODY MASS INDEX: 30.69 KG/M2

## 2023-03-14 DIAGNOSIS — I25.10 CORONARY ARTERY DISEASE INVOLVING NATIVE CORONARY ARTERY OF NATIVE HEART WITHOUT ANGINA PECTORIS: ICD-10-CM

## 2023-03-14 DIAGNOSIS — H81.10 BENIGN PAROXYSMAL POSITIONAL VERTIGO, UNSPECIFIED LATERALITY: Primary | ICD-10-CM

## 2023-03-14 DIAGNOSIS — R07.9 CHEST PAIN, UNSPECIFIED TYPE: ICD-10-CM

## 2023-03-14 DIAGNOSIS — R06.02 SOB (SHORTNESS OF BREATH): ICD-10-CM

## 2023-03-14 DIAGNOSIS — R00.2 PALPITATIONS: ICD-10-CM

## 2023-03-14 PROCEDURE — 3077F SYST BP >= 140 MM HG: CPT | Performed by: PHYSICIAN ASSISTANT

## 2023-03-14 PROCEDURE — 3079F DIAST BP 80-89 MM HG: CPT | Performed by: PHYSICIAN ASSISTANT

## 2023-03-14 PROCEDURE — 99214 OFFICE O/P EST MOD 30 MIN: CPT | Performed by: PHYSICIAN ASSISTANT

## 2023-03-14 RX ORDER — PANTOPRAZOLE SODIUM 40 MG/1
40 TABLET, DELAYED RELEASE ORAL DAILY
COMMUNITY

## 2023-03-14 NOTE — PROGRESS NOTES
"Problem list     Subjective   Ye Butler is a 73 y.o. male     Chief Complaint   Patient presents with   • Follow-up     6 MTH F/U    Problem list  1.  Coronary artery disease  1.1 cardiac catheterization March 2018 because of refractory angina despite negative stress testing demonstrated high-grade RCA disease status post stenting.  Patient underwent stenting ×2 of the circumflex with 70% distal LAD disease and medical management recommended.  1.2 stress test October 2018 demonstrates no evidence of ischemia and preserved LV function  1.3 stress test September 2020 with no evidence of ischemia preserved LV function  2.  Accelerated Idioventricular rhythm  2.1 EKG at primary office demonstrated idioventricular rhythm at 90 bpm.  This was prior to catheterization  2.2 event monitor June 2021 with sinus rhythm and occasional PVCs.  No other arrhythmias noted  3.  Hypertension  4..  Dyslipidemia  5.  Type 2 diabetes mellitus  6.  Family history of premature coronary artery disease and a brother with sudden cardiac death       HPI    Patient is a 73-year-old male who presents to the office for evaluation.  As above, patient has history of coronary disease with multivessel stenting in the past.  Patient did have residual 70% distal LAD disease.  He also had an idioventricular rhythm on monitoring prior to catheterization.    Since that time he has done well.  However, he has had some discomfort in his chest described as a \"pinpricking\" sensation on the left side of his chest that seems atypical by description.  He does have shortness of breath when exerting or do activity but this appears to be chronic without any progression.  He does not describe any decline in functional status.  No PND orthopnea    He does occasionally palpitate.  He does not describe any associated dizziness presyncope or syncope.  This will happen spontaneously and last for \"a second or 2\" and then stop.  He does not describe any symptoms to " suggest cerebral Bolick events.    Patient describes having issues with what appears to be vertigo.  Whenever he turns over in bed or moves his head he will feel as if he is lightheaded or dizzy with room is moving.  Otherwise, patient is stable.      Current Outpatient Medications on File Prior to Visit   Medication Sig Dispense Refill   • amLODIPine (NORVASC) 2.5 MG tablet 1 tablet Every Night.     • aspirin 81 MG tablet Take 1 tablet by mouth Daily. 30 tablet 11   • Docusate Sodium 100 MG capsule Take 100 mg by mouth As Needed for Constipation.     • famotidine (PEPCID) 40 MG tablet Take 1 tablet by mouth Daily.     • glimepiride (AMARYL) 4 MG tablet Take 1 tablet by mouth Daily.     • levothyroxine (SYNTHROID, LEVOTHROID) 50 MCG tablet Take 1 tablet by mouth Daily.  0   • lisinopril (PRINIVIL,ZESTRIL) 20 MG tablet Take 1 tablet by mouth 2 (Two) Times a Day. 60 tablet 5   • memantine (NAMENDA) 5 MG tablet Take 1 tablet by mouth Daily.     • metFORMIN (GLUCOPHAGE) 500 MG tablet Take 1 tablet by mouth 2 (Two) Times a Day With Meals.  0   • metoprolol tartrate (LOPRESSOR) 25 MG tablet 1 tablet 2 (Two) Times a Day.     • montelukast (SINGULAIR) 10 MG tablet Take 1 tablet by mouth Every Night.     • nitroglycerin (NITROSTAT) 0.4 MG SL tablet 1 under the tongue as needed for angina, may repeat q5mins for up three doses 100 tablet 11   • NON FORMULARY Morphine pump     • pantoprazole (PROTONIX) 40 MG EC tablet Take 1 tablet by mouth Daily.     • pravastatin (PRAVACHOL) 40 MG tablet TAKE ONE TABLET BY MOUTH DAILY 90 tablet 3   • sertraline (ZOLOFT) 25 MG tablet Take 1 tablet by mouth Daily.     • tamsulosin (FLOMAX) 0.4 MG capsule 24 hr capsule 1 capsule Daily.     • Testosterone Cypionate (DEPOTESTOTERONE CYPIONATE) 200 MG/ML injection Inject 1 mL into the appropriate muscle as directed by prescriber Every 21 (Twenty-One) Days.     • Zolpidem Tartrate 5 MG/ACT solution Take  by mouth.     • clarithromycin (BIAXIN) 500  MG tablet Take 500 mg by mouth 2 (Two) Times a Day. (Patient not taking: Reported on 3/14/2023)     • Linzess 145 MCG capsule capsule  (Patient not taking: Reported on 3/14/2023)     • OMEPRAZOLE PO Take 40 mg by mouth Daily. (Patient not taking: Reported on 3/14/2023)       No current facility-administered medications on file prior to visit.       Claritin [loratadine] and Codeine    Past Medical History:   Diagnosis Date   • Appendicitis    • Arthritis    • COPD (chronic obstructive pulmonary disease) (HCC)    • Coronary artery disease    • COVID-19 2021   • Diabetes mellitus (HCC)    • Disease of thyroid gland    • Diverticulitis    • Elevated cholesterol    • Emphysema lung (HCC)    • Emphysema lung (HCC)    • Emphysema lung (HCC)    • GERD (gastroesophageal reflux disease)    • Hyperlipidemia    • Hypertension    • Kidney stone    • MI (myocardial infarction) (HCC)    • PONV (postoperative nausea and vomiting)    • Stomach ulcer    • Stroke (HCC) 2018       Social History     Socioeconomic History   • Marital status:    Tobacco Use   • Smoking status: Former     Years: 20.00     Types: Cigarettes   • Smokeless tobacco: Never   • Tobacco comments:     quit 20+ yrs ago    Vaping Use   • Vaping Use: Never used   Substance and Sexual Activity   • Alcohol use: No     Comment: former use 20 + yrs ago    • Drug use: No   • Sexual activity: Defer       Family History   Problem Relation Age of Onset   • Cancer Mother    • Heart disease Father    • Stroke Father        Review of Systems   Constitutional: Negative for appetite change, chills, diaphoresis and fever.   HENT: Positive for sore throat. Negative for dental problem, drooling, ear discharge, ear pain, rhinorrhea, sinus pressure, sinus pain, sneezing and tinnitus.    Eyes: Negative for pain, redness, itching and visual disturbance.   Respiratory: Negative for cough, choking, shortness of breath and wheezing.    Cardiovascular: Positive for chest pain and  "palpitations. Negative for leg swelling.   Gastrointestinal: Negative for blood in stool, constipation, diarrhea and nausea.   Genitourinary: Negative.  Negative for difficulty urinating.   Musculoskeletal: Positive for arthralgias and back pain. Negative for neck pain.   Skin: Negative for rash and wound.   Neurological: Positive for dizziness. Negative for weakness and numbness.   Psychiatric/Behavioral: Positive for sleep disturbance.       Objective   Vitals:    03/14/23 0907   BP: 157/87   Pulse: 63   SpO2: 99%   Weight: 94 kg (207 lb 3.2 oz)   Height: 175.3 cm (69.02\")      /87   Pulse 63   Ht 175.3 cm (69.02\")   Wt 94 kg (207 lb 3.2 oz)   SpO2 99%   BMI 30.58 kg/m²     Lab Results (most recent)     None          Physical Exam  Vitals and nursing note reviewed.   Constitutional:       General: He is not in acute distress.     Appearance: Normal appearance. He is well-developed.   HENT:      Head: Normocephalic and atraumatic.   Eyes:      General: No scleral icterus.        Right eye: No discharge.         Left eye: No discharge.      Conjunctiva/sclera: Conjunctivae normal.   Neck:      Vascular: No carotid bruit.   Cardiovascular:      Rate and Rhythm: Normal rate and regular rhythm.      Heart sounds: Normal heart sounds. No murmur heard.    No friction rub. No gallop.   Pulmonary:      Effort: Pulmonary effort is normal. No respiratory distress.      Breath sounds: Normal breath sounds. No wheezing or rales.   Chest:      Chest wall: No tenderness.   Musculoskeletal:      Right lower leg: No edema.      Left lower leg: No edema.   Skin:     General: Skin is warm and dry.      Coloration: Skin is not pale.      Findings: No erythema or rash.   Neurological:      Mental Status: He is alert and oriented to person, place, and time.      Cranial Nerves: No cranial nerve deficit.   Psychiatric:         Behavior: Behavior normal.         Procedure   Procedures       Assessment & Plan     Problems " Addressed this Visit        Cardiac and Vasculature    Chest pain    Relevant Orders    Adult Transthoracic Echo Complete W/ Cont if Necessary Per Protocol    Stress Test With Myocardial Perfusion One Day    Palpitations    Relevant Orders    Adult Transthoracic Echo Complete W/ Cont if Necessary Per Protocol    Stress Test With Myocardial Perfusion One Day    Coronary artery disease involving native coronary artery of native heart without angina pectoris    Relevant Orders    Adult Transthoracic Echo Complete W/ Cont if Necessary Per Protocol    Stress Test With Myocardial Perfusion One Day       ENT    Benign paroxysmal positional vertigo - Primary    Relevant Orders    Ambulatory Referral to Physical Therapy Vestibular    Adult Transthoracic Echo Complete W/ Cont if Necessary Per Protocol    Stress Test With Myocardial Perfusion One Day       Pulmonary and Pneumonias    SOB (shortness of breath)    Relevant Orders    Adult Transthoracic Echo Complete W/ Cont if Necessary Per Protocol    Stress Test With Myocardial Perfusion One Day   Diagnoses       Codes Comments    Benign paroxysmal positional vertigo, unspecified laterality    -  Primary ICD-10-CM: H81.10  ICD-9-CM: 386.11     Coronary artery disease involving native coronary artery of native heart without angina pectoris     ICD-10-CM: I25.10  ICD-9-CM: 414.01     SOB (shortness of breath)     ICD-10-CM: R06.02  ICD-9-CM: 786.05     Palpitations     ICD-10-CM: R00.2  ICD-9-CM: 785.1     Chest pain, unspecified type     ICD-10-CM: R07.9  ICD-9-CM: 786.50         Recommendation  1.  Patient is a 73-year-old male who presents to the office for evaluation.  Patient has atypical chest pain but can experience mild dyspnea.  He also has had palpitations.  He has residual 70% distal LAD disease.  Because of this, I would like to schedule repeat testing.    2.  He would like to have repeat cardiac assessment I will schedule for nuclear stress test for  evaluation.    3.  Echo to assess and evaluate LV systolic, diastolic function, valvular structures etc.    4.  Patient would like treatment regards to his vertigo and vestibular issues.  I will make referral to PT to see if that will help and he can follow-up further with PCP.    5.  We discussed event monitoring although he has no symptoms to suggest malignant arrhythmia.  He is not interested at this time.    6.  We will see him back for follow-up with above testing.  He has diabetes and lipids have been monitored by primary.  I do have her lipid panel from a few years ago in which they were relatively controlled with an LDL in the 70s.  They are being monitored.  We will see him back for follow-up after testing to recommend further.  Follow-up with primary as scheduled.             Ye Butler  reports that he has quit smoking. His smoking use included cigarettes. He has never used smokeless tobacco..           Advance Care Planning   ACP discussion was declined by the patient. Patient does not have an advance directive, declines further assistance.        Electronically signed by:

## 2023-04-03 ENCOUNTER — TELEPHONE (OUTPATIENT)
Dept: CARDIOLOGY | Facility: CLINIC | Age: 73
End: 2023-04-03
Payer: MEDICARE

## 2023-04-03 NOTE — TELEPHONE ENCOUNTER
Caller: Becca Butler    Relationship: Emergency Contact    Best call back number: [phone]    What was the call regarding: PTS WIFE REPORTS PT IS HAVING SORE THROAT AND COUGHING - SHE IS WONDERING IF THIS WILL INTERFER WITH HIS STRESS TEST TOMORROW - PT REPORTS NO OTHER SYMPTOMS AT THIS TIME & HE HAS AN APPT WITH HIS PCP THIS AFTERNOON    Do you require a callback: NO

## 2023-04-03 NOTE — TELEPHONE ENCOUNTER
I called and informed pt's wife that unless he has a fever, he should be able to proceed w/ testing. She verbalized understanding.

## 2023-04-04 ENCOUNTER — HOSPITAL ENCOUNTER (OUTPATIENT)
Dept: CARDIOLOGY | Facility: HOSPITAL | Age: 73
Discharge: HOME | End: 2023-04-04
Payer: MEDICARE

## 2023-04-04 VITALS — WEIGHT: 207.23 LBS | BODY MASS INDEX: 30.69 KG/M2 | HEIGHT: 69 IN

## 2023-04-04 DIAGNOSIS — R06.02 SOB (SHORTNESS OF BREATH): ICD-10-CM

## 2023-04-04 DIAGNOSIS — H81.10 BENIGN PAROXYSMAL POSITIONAL VERTIGO, UNSPECIFIED LATERALITY: ICD-10-CM

## 2023-04-04 DIAGNOSIS — I25.10 CORONARY ARTERY DISEASE INVOLVING NATIVE CORONARY ARTERY OF NATIVE HEART WITHOUT ANGINA PECTORIS: ICD-10-CM

## 2023-04-04 DIAGNOSIS — R00.2 PALPITATIONS: ICD-10-CM

## 2023-04-04 DIAGNOSIS — R07.9 CHEST PAIN, UNSPECIFIED TYPE: ICD-10-CM

## 2023-04-04 LAB
AORTIC DIMENSIONLESS INDEX: 0.7 (DI)
BH CV ECHO MEAS - ACS: 1.87 CM
BH CV ECHO MEAS - AO MAX PG: 8.5 MMHG
BH CV ECHO MEAS - AO MEAN PG: 4.5 MMHG
BH CV ECHO MEAS - AO ROOT DIAM: 3.4 CM
BH CV ECHO MEAS - AO V2 MAX: 145.9 CM/SEC
BH CV ECHO MEAS - AO V2 VTI: 31.2 CM
BH CV ECHO MEAS - EDV(CUBED): 144.9 ML
BH CV ECHO MEAS - ESV(CUBED): 56.3 ML
BH CV ECHO MEAS - FS: 27 %
BH CV ECHO MEAS - IVS/LVPW: 1 CM
BH CV ECHO MEAS - IVSD: 1.26 CM
BH CV ECHO MEAS - LA DIMENSION: 4.5 CM
BH CV ECHO MEAS - LAT PEAK E' VEL: 7.4 CM/SEC
BH CV ECHO MEAS - LV MASS(C)D: 270 GRAMS
BH CV ECHO MEAS - LV MAX PG: 4.1 MMHG
BH CV ECHO MEAS - LV MEAN PG: 1.9 MMHG
BH CV ECHO MEAS - LV V1 MAX: 101.8 CM/SEC
BH CV ECHO MEAS - LV V1 VTI: 22.9 CM
BH CV ECHO MEAS - LVIDD: 5.3 CM
BH CV ECHO MEAS - LVIDS: 3.8 CM
BH CV ECHO MEAS - LVPWD: 1.26 CM
BH CV ECHO MEAS - MED PEAK E' VEL: 6.5 CM/SEC
BH CV ECHO MEAS - MV A MAX VEL: 88.7 CM/SEC
BH CV ECHO MEAS - MV DEC SLOPE: 292.1 CM/SEC2
BH CV ECHO MEAS - MV DEC TIME: 0.25 MSEC
BH CV ECHO MEAS - MV E MAX VEL: 67.4 CM/SEC
BH CV ECHO MEAS - MV E/A: 0.76
BH CV ECHO MEAS - MV MAX PG: 3.5 MMHG
BH CV ECHO MEAS - MV MEAN PG: 1.36 MMHG
BH CV ECHO MEAS - MV P1/2T: 88.2 MSEC
BH CV ECHO MEAS - MV V2 VTI: 35.7 CM
BH CV ECHO MEAS - MVA(P1/2T): 2.5 CM2
BH CV ECHO MEAS - PA V2 MAX: 99.5 CM/SEC
BH CV ECHO MEAS - RAP SYSTOLE: 8 MMHG
BH CV ECHO MEAS - RV MAX PG: 1.7 MMHG
BH CV ECHO MEAS - RV V1 MAX: 65.3 CM/SEC
BH CV ECHO MEAS - RV V1 VTI: 15.3 CM
BH CV ECHO MEAS - RVDD: 3.5 CM
BH CV ECHO MEAS - RVSP: 29.3 MMHG
BH CV ECHO MEAS - TAPSE (>1.6): 1.58 CM
BH CV ECHO MEAS - TR MAX PG: 21.3 MMHG
BH CV ECHO MEAS - TR MAX VEL: 230.9 CM/SEC
BH CV ECHO MEASUREMENTS AVERAGE E/E' RATIO: 9.7
BH CV XLRA - TDI S': 14.1 CM/SEC
LV EF 2D ECHO EST: 52 %
MAXIMAL PREDICTED HEART RATE: 149 BPM
SINUS: 3 CM
STRESS TARGET HR: 127 BPM

## 2023-04-04 PROCEDURE — 78452 HT MUSCLE IMAGE SPECT MULT: CPT | Performed by: INTERNAL MEDICINE

## 2023-04-04 PROCEDURE — 93017 CV STRESS TEST TRACING ONLY: CPT

## 2023-04-04 PROCEDURE — 93306 TTE W/DOPPLER COMPLETE: CPT

## 2023-04-04 PROCEDURE — 25010000002 REGADENOSON 0.4 MG/5ML SOLUTION: Performed by: INTERNAL MEDICINE

## 2023-04-04 PROCEDURE — 93018 CV STRESS TEST I&R ONLY: CPT | Performed by: INTERNAL MEDICINE

## 2023-04-04 PROCEDURE — 0 TECHNETIUM SESTAMIBI: Performed by: INTERNAL MEDICINE

## 2023-04-04 PROCEDURE — 78452 HT MUSCLE IMAGE SPECT MULT: CPT

## 2023-04-04 PROCEDURE — A9500 TC99M SESTAMIBI: HCPCS | Performed by: INTERNAL MEDICINE

## 2023-04-04 RX ADMIN — TECHNETIUM TC 99M SESTAMIBI 1 DOSE: 1 INJECTION INTRAVENOUS at 13:13

## 2023-04-04 RX ADMIN — REGADENOSON 0.4 MG: 0.08 INJECTION, SOLUTION INTRAVENOUS at 13:13

## 2023-04-04 RX ADMIN — TECHNETIUM TC 99M SESTAMIBI 1 DOSE: 1 INJECTION INTRAVENOUS at 11:03

## 2023-04-10 LAB
BH CV REST NUCLEAR ISOTOPE DOSE: 10 MCI
BH CV STRESS COMMENTS STAGE 1: NORMAL
BH CV STRESS DOSE REGADENOSON STAGE 1: 0.4
BH CV STRESS DURATION MIN STAGE 1: 0
BH CV STRESS DURATION SEC STAGE 1: 10
BH CV STRESS NUCLEAR ISOTOPE DOSE: 30 MCI
BH CV STRESS PROTOCOL 1: NORMAL
BH CV STRESS RECOVERY BP: NORMAL MMHG
BH CV STRESS RECOVERY HR: 76 BPM
BH CV STRESS STAGE 1: 1
MAXIMAL PREDICTED HEART RATE: 149 BPM
PERCENT MAX PREDICTED HR: 46.98 %
STRESS BASELINE BP: NORMAL MMHG
STRESS BASELINE HR: 62 BPM
STRESS PERCENT HR: 55 %
STRESS POST PEAK BP: NORMAL MMHG
STRESS POST PEAK HR: 70 BPM
STRESS TARGET HR: 127 BPM

## 2023-04-12 ENCOUNTER — TELEPHONE (OUTPATIENT)
Dept: CARDIOLOGY | Facility: CLINIC | Age: 73
End: 2023-04-12
Payer: MEDICARE

## 2023-04-12 NOTE — TELEPHONE ENCOUNTER
STRESS  Called patient to notify of no acute findings or abnormalities. Keep follow up as scheduled. If you have any problem between now and then give our office a call.   ----- Message from THONY Lind sent at 4/12/2023  3:29 PM EDT -----  Routine follow-up

## 2023-04-13 NOTE — TELEPHONE ENCOUNTER
Patient left message that he missed a call and would like a call back.    Patient missed call regarding stress test, routine follow up.     Interpretation Summary    1.  No scintigraphic evidence of ischemia.    Attempted to call patient however voicemail not set up.

## 2023-04-19 ENCOUNTER — APPOINTMENT (OUTPATIENT)
Dept: CT IMAGING | Facility: HOSPITAL | Age: 73
End: 2023-04-19
Payer: MEDICARE

## 2023-04-19 ENCOUNTER — HOSPITAL ENCOUNTER (EMERGENCY)
Facility: HOSPITAL | Age: 73
Discharge: HOME | End: 2023-04-19
Attending: EMERGENCY MEDICINE | Admitting: EMERGENCY MEDICINE
Payer: MEDICARE

## 2023-04-19 VITALS
RESPIRATION RATE: 18 BRPM | DIASTOLIC BLOOD PRESSURE: 89 MMHG | SYSTOLIC BLOOD PRESSURE: 159 MMHG | HEART RATE: 73 BPM | OXYGEN SATURATION: 98 % | WEIGHT: 205 LBS | HEIGHT: 69 IN | TEMPERATURE: 97.7 F | BODY MASS INDEX: 30.36 KG/M2

## 2023-04-19 DIAGNOSIS — N23 RENAL COLIC ON RIGHT SIDE: Primary | ICD-10-CM

## 2023-04-19 LAB
ALBUMIN SERPL-MCNC: 3.6 G/DL (ref 3.5–5.2)
ALBUMIN/GLOB SERPL: 1.2 G/DL
ALP SERPL-CCNC: 74 U/L (ref 39–117)
ALT SERPL W P-5'-P-CCNC: 12 U/L (ref 1–41)
ANION GAP SERPL CALCULATED.3IONS-SCNC: 10.6 MMOL/L (ref 5–15)
AST SERPL-CCNC: 25 U/L (ref 1–40)
BACTERIA UR QL AUTO: ABNORMAL /HPF
BASOPHILS # BLD AUTO: 0.05 10*3/MM3 (ref 0–0.2)
BASOPHILS NFR BLD AUTO: 0.7 % (ref 0–1.5)
BILIRUB SERPL-MCNC: 0.3 MG/DL (ref 0–1.2)
BILIRUB UR QL STRIP: NEGATIVE
BUN SERPL-MCNC: 10 MG/DL (ref 8–23)
BUN/CREAT SERPL: 9.7 (ref 7–25)
CALCIUM SPEC-SCNC: 8.7 MG/DL (ref 8.6–10.5)
CHLORIDE SERPL-SCNC: 106 MMOL/L (ref 98–107)
CLARITY UR: CLEAR
CO2 SERPL-SCNC: 22.4 MMOL/L (ref 22–29)
COLOR UR: ABNORMAL
CREAT SERPL-MCNC: 1.03 MG/DL (ref 0.76–1.27)
CRP SERPL-MCNC: <0.3 MG/DL (ref 0–0.5)
DEPRECATED RDW RBC AUTO: 46.4 FL (ref 37–54)
EGFRCR SERPLBLD CKD-EPI 2021: 77.7 ML/MIN/1.73
EOSINOPHIL # BLD AUTO: 0.18 10*3/MM3 (ref 0–0.4)
EOSINOPHIL NFR BLD AUTO: 2.5 % (ref 0.3–6.2)
ERYTHROCYTE [DISTWIDTH] IN BLOOD BY AUTOMATED COUNT: 15.2 % (ref 12.3–15.4)
ERYTHROCYTE [SEDIMENTATION RATE] IN BLOOD: 2 MM/HR (ref 0–20)
GLOBULIN UR ELPH-MCNC: 2.9 GM/DL
GLUCOSE SERPL-MCNC: 160 MG/DL (ref 65–99)
GLUCOSE UR STRIP-MCNC: ABNORMAL MG/DL
HCT VFR BLD AUTO: 50 % (ref 37.5–51)
HGB BLD-MCNC: 16.4 G/DL (ref 13–17.7)
HGB UR QL STRIP.AUTO: ABNORMAL
HOLD SPECIMEN: NORMAL
HOLD SPECIMEN: NORMAL
HYALINE CASTS UR QL AUTO: ABNORMAL /LPF
IMM GRANULOCYTES # BLD AUTO: 0.02 10*3/MM3 (ref 0–0.05)
IMM GRANULOCYTES NFR BLD AUTO: 0.3 % (ref 0–0.5)
KETONES UR QL STRIP: ABNORMAL
LEUKOCYTE ESTERASE UR QL STRIP.AUTO: NEGATIVE
LIPASE SERPL-CCNC: 17 U/L (ref 13–60)
LYMPHOCYTES # BLD AUTO: 1.06 10*3/MM3 (ref 0.7–3.1)
LYMPHOCYTES NFR BLD AUTO: 14.6 % (ref 19.6–45.3)
MAGNESIUM SERPL-MCNC: 1.6 MG/DL (ref 1.6–2.4)
MCH RBC QN AUTO: 27.7 PG (ref 26.6–33)
MCHC RBC AUTO-ENTMCNC: 32.8 G/DL (ref 31.5–35.7)
MCV RBC AUTO: 84.6 FL (ref 79–97)
MONOCYTES # BLD AUTO: 0.46 10*3/MM3 (ref 0.1–0.9)
MONOCYTES NFR BLD AUTO: 6.4 % (ref 5–12)
NEUTROPHILS NFR BLD AUTO: 5.47 10*3/MM3 (ref 1.7–7)
NEUTROPHILS NFR BLD AUTO: 75.5 % (ref 42.7–76)
NITRITE UR QL STRIP: NEGATIVE
NRBC BLD AUTO-RTO: 0 /100 WBC (ref 0–0.2)
PH UR STRIP.AUTO: 5.5 [PH] (ref 5–8)
PLATELET # BLD AUTO: 200 10*3/MM3 (ref 140–450)
PMV BLD AUTO: 11.7 FL (ref 6–12)
POTASSIUM SERPL-SCNC: 4.7 MMOL/L (ref 3.5–5.2)
PROT SERPL-MCNC: 6.5 G/DL (ref 6–8.5)
PROT UR QL STRIP: ABNORMAL
RBC # BLD AUTO: 5.91 10*6/MM3 (ref 4.14–5.8)
RBC # UR STRIP: ABNORMAL /HPF
REF LAB TEST METHOD: ABNORMAL
SODIUM SERPL-SCNC: 139 MMOL/L (ref 136–145)
SP GR UR STRIP: 1.02 (ref 1–1.03)
SQUAMOUS #/AREA URNS HPF: ABNORMAL /HPF
UROBILINOGEN UR QL STRIP: ABNORMAL
WBC # UR STRIP: ABNORMAL /HPF
WBC NRBC COR # BLD: 7.24 10*3/MM3 (ref 3.4–10.8)
WHOLE BLOOD HOLD COAG: NORMAL
WHOLE BLOOD HOLD SPECIMEN: NORMAL

## 2023-04-19 PROCEDURE — 83735 ASSAY OF MAGNESIUM: CPT | Performed by: PHYSICIAN ASSISTANT

## 2023-04-19 PROCEDURE — 96361 HYDRATE IV INFUSION ADD-ON: CPT

## 2023-04-19 PROCEDURE — 86140 C-REACTIVE PROTEIN: CPT | Performed by: PHYSICIAN ASSISTANT

## 2023-04-19 PROCEDURE — 85025 COMPLETE CBC W/AUTO DIFF WBC: CPT | Performed by: PHYSICIAN ASSISTANT

## 2023-04-19 PROCEDURE — 74176 CT ABD & PELVIS W/O CONTRAST: CPT

## 2023-04-19 PROCEDURE — 85652 RBC SED RATE AUTOMATED: CPT | Performed by: PHYSICIAN ASSISTANT

## 2023-04-19 PROCEDURE — 83690 ASSAY OF LIPASE: CPT | Performed by: PHYSICIAN ASSISTANT

## 2023-04-19 PROCEDURE — 81001 URINALYSIS AUTO W/SCOPE: CPT | Performed by: PHYSICIAN ASSISTANT

## 2023-04-19 PROCEDURE — 74176 CT ABD & PELVIS W/O CONTRAST: CPT | Performed by: RADIOLOGY

## 2023-04-19 PROCEDURE — 96360 HYDRATION IV INFUSION INIT: CPT

## 2023-04-19 PROCEDURE — 99285 EMERGENCY DEPT VISIT HI MDM: CPT

## 2023-04-19 PROCEDURE — 80053 COMPREHEN METABOLIC PANEL: CPT | Performed by: PHYSICIAN ASSISTANT

## 2023-04-19 RX ORDER — ONDANSETRON 4 MG/1
4 TABLET, ORALLY DISINTEGRATING ORAL EVERY 6 HOURS PRN
Qty: 10 TABLET | Refills: 0 | Status: SHIPPED | OUTPATIENT
Start: 2023-04-19

## 2023-04-19 RX ORDER — OXYCODONE HYDROCHLORIDE AND ACETAMINOPHEN 5; 325 MG/1; MG/1
1 TABLET ORAL EVERY 6 HOURS PRN
Qty: 10 TABLET | Refills: 0 | Status: SHIPPED | OUTPATIENT
Start: 2023-04-19

## 2023-04-19 RX ORDER — SODIUM CHLORIDE 0.9 % (FLUSH) 0.9 %
10 SYRINGE (ML) INJECTION AS NEEDED
Status: DISCONTINUED | OUTPATIENT
Start: 2023-04-19 | End: 2023-04-19 | Stop reason: HOSPADM

## 2023-04-19 RX ORDER — SODIUM CHLORIDE 9 MG/ML
125 INJECTION, SOLUTION INTRAVENOUS CONTINUOUS
Status: DISCONTINUED | OUTPATIENT
Start: 2023-04-19 | End: 2023-04-19 | Stop reason: HOSPADM

## 2023-04-19 RX ORDER — OXYCODONE HYDROCHLORIDE AND ACETAMINOPHEN 5; 325 MG/1; MG/1
1 TABLET ORAL ONCE
Status: COMPLETED | OUTPATIENT
Start: 2023-04-19 | End: 2023-04-19

## 2023-04-19 RX ADMIN — SODIUM CHLORIDE 125 ML/HR: 9 INJECTION, SOLUTION INTRAVENOUS at 10:52

## 2023-04-19 RX ADMIN — OXYCODONE HYDROCHLORIDE AND ACETAMINOPHEN 1 TABLET: 5; 325 TABLET ORAL at 12:45

## 2023-04-19 NOTE — ED NOTES
Provided patient with a urine specimen cup and informed him we need a urine sample, Pt verbalized understanding and states he will provide one as soon as he feel he is able to urinate

## 2023-04-19 NOTE — DISCHARGE INSTRUCTIONS
Follow-up with the urology clinic at the next available appointment.  A prescription for pain and nausea medication has been sent to your pharmacy.  Continue your regular medications as prescribed.  Return to the ED at any time if symptoms change or worsen.

## 2023-04-19 NOTE — ED PROVIDER NOTES
Subjective   History of Present Illness  73-year-old male presents to the ED today for right flank pain.  He states he has been having intermittent right flank pain for about a week but it became worse this morning.  He states the pain is worse with movement.  He does have a history of bilateral intrarenal stones.  He states he has had large ureteral stones in the past that have required lithotripsy.  He is followed by Dr. Melendrez.  He denies any dysuria, urinary urgency or frequency.  He denies any gross hematuria.  He denies any fever.  He denies any nausea or vomiting.  He did not take any home medications for his symptoms.  He was given 15 mg of IV Toradol per EMS.  He states this has helped his pain.  He does have a morphine pain pump for his back.    History provided by:  Patient  Flank Pain  Pain location:  R flank  Pain quality: sharp    Pain radiates to:  Does not radiate  Pain severity:  Moderate  Onset quality:  Gradual  Duration:  1 week  Timing:  Intermittent  Progression:  Worsening  Chronicity:  New  Context: not trauma    Relieved by:  Nothing  Worsened by:  Movement  Ineffective treatments:  None tried  Associated symptoms: no anorexia, no chest pain, no chills, no constipation, no cough, no diarrhea, no dysuria, no fatigue, no fever, no hematemesis, no hematochezia, no hematuria, no melena, no nausea, no shortness of breath and no vomiting        Review of Systems   Constitutional: Negative.  Negative for chills, fatigue and fever.   HENT: Negative.    Eyes: Negative.    Respiratory: Negative.  Negative for cough and shortness of breath.    Cardiovascular: Negative for chest pain.   Gastrointestinal: Negative for anorexia, constipation, diarrhea, hematemesis, hematochezia, melena, nausea and vomiting.   Genitourinary: Positive for flank pain. Negative for difficulty urinating, dysuria, frequency, hematuria and urgency.   Musculoskeletal: Positive for back pain.   Skin: Negative.    Neurological:  Negative.    Psychiatric/Behavioral: Negative.    All other systems reviewed and are negative.      Past Medical History:   Diagnosis Date   • Appendicitis    • Arthritis    • COPD (chronic obstructive pulmonary disease)    • Coronary artery disease    • COVID-19 2021   • Diabetes mellitus    • Disease of thyroid gland    • Diverticulitis    • Elevated cholesterol    • Emphysema lung    • Emphysema lung    • Emphysema lung    • GERD (gastroesophageal reflux disease)    • Hyperlipidemia    • Hypertension    • Kidney stone    • MI (myocardial infarction)    • PONV (postoperative nausea and vomiting)    • Stomach ulcer    • Stroke 2018       Allergies   Allergen Reactions   • Claritin [Loratadine] Other (See Comments)     Possible kidney issues   • Codeine Other (See Comments)     Kidney issues        Past Surgical History:   Procedure Laterality Date   • APPENDECTOMY     • CARDIAC CATHETERIZATION     • COLONOSCOPY     • CORONARY STENT PLACEMENT     • ENDOSCOPY     • EXTRACORPOREAL SHOCK WAVE LITHOTRIPSY (ESWL) Right 7/23/2021    Procedure: EXTRACORPOREAL SHOCKWAVE LITHOTRIPSY;  Surgeon: Nabeel Melendrez MD;  Location: [facility];  Service: Urology;  Laterality: Right;   • PAIN PUMP TRIAL     • TURP / TRANSURETHRAL INCISION / DRAINAGE PROSTATE         Family History   Problem Relation Age of Onset   • Cancer Mother    • Heart disease Father    • Stroke Father        Social History     Socioeconomic History   • Marital status:    Tobacco Use   • Smoking status: Former     Years: 20.00     Types: Cigarettes   • Smokeless tobacco: Never   • Tobacco comments:     quit 20+ yrs ago    Vaping Use   • Vaping Use: Never used   Substance and Sexual Activity   • Alcohol use: No     Comment: former use 20 + yrs ago    • Drug use: No   • Sexual activity: Defer           Objective   Physical Exam  Vitals and nursing note reviewed.   Constitutional:       General: He is not in acute distress.     Appearance: He is  well-developed. He is not diaphoretic.   HENT:      Head: Normocephalic and atraumatic.   Eyes:      Extraocular Movements: Extraocular movements intact.      Pupils: Pupils are equal, round, and reactive to light.   Cardiovascular:      Rate and Rhythm: Normal rate and regular rhythm.      Heart sounds: Normal heart sounds.   Pulmonary:      Effort: Pulmonary effort is normal.      Breath sounds: Normal breath sounds. No wheezing or rhonchi.   Chest:      Chest wall: No tenderness.   Abdominal:      General: Bowel sounds are normal.      Palpations: Abdomen is soft.      Tenderness: There is abdominal tenderness (mild tenderness in right flank on exam). There is no right CVA tenderness, left CVA tenderness, guarding or rebound.   Skin:     General: Skin is warm and dry.      Capillary Refill: Capillary refill takes less than 2 seconds.   Neurological:      General: No focal deficit present.      Mental Status: He is alert and oriented to person, place, and time.   Psychiatric:         Mood and Affect: Mood normal.         Procedures        Results for orders placed or performed during the hospital encounter of 04/19/23   Comprehensive Metabolic Panel    Specimen: Blood   Result Value Ref Range    Glucose 160 (H) 65 - 99 mg/dL    BUN 10 8 - 23 mg/dL    Creatinine 1.03 0.76 - 1.27 mg/dL    Sodium 139 136 - 145 mmol/L    Potassium 4.7 3.5 - 5.2 mmol/L    Chloride 106 98 - 107 mmol/L    CO2 22.4 22.0 - 29.0 mmol/L    Calcium 8.7 8.6 - 10.5 mg/dL    Total Protein 6.5 6.0 - 8.5 g/dL    Albumin 3.6 3.5 - 5.2 g/dL    ALT (SGPT) 12 1 - 41 U/L    AST (SGOT) 25 1 - 40 U/L    Alkaline Phosphatase 74 39 - 117 U/L    Total Bilirubin 0.3 0.0 - 1.2 mg/dL    Globulin 2.9 gm/dL    A/G Ratio 1.2 g/dL    BUN/Creatinine Ratio 9.7 7.0 - 25.0    Anion Gap 10.6 5.0 - 15.0 mmol/L    eGFR 77.7 >60.0 mL/min/1.73   Lipase    Specimen: Blood   Result Value Ref Range    Lipase 17 13 - 60 U/L   Urinalysis With Culture If Indicated - Urine, Clean  Catch    Specimen: Urine, Clean Catch   Result Value Ref Range    Color, UA Dark Yellow (A) Yellow, Straw    Appearance, UA Clear Clear    pH, UA 5.5 5.0 - 8.0    Specific Gravity, UA 1.025 1.005 - 1.030    Glucose,  mg/dL (Trace) (A) Negative    Ketones, UA Trace (A) Negative    Bilirubin, UA Negative Negative    Blood, UA Large (3+) (A) Negative    Protein, UA 30 mg/dL (1+) (A) Negative    Leuk Esterase, UA Negative Negative    Nitrite, UA Negative Negative    Urobilinogen, UA 0.2 E.U./dL 0.2 - 1.0 E.U./dL   Sedimentation Rate    Specimen: Blood   Result Value Ref Range    Sed Rate 2 0 - 20 mm/hr   C-reactive Protein    Specimen: Blood   Result Value Ref Range    C-Reactive Protein <0.30 0.00 - 0.50 mg/dL   Magnesium    Specimen: Blood   Result Value Ref Range    Magnesium 1.6 1.6 - 2.4 mg/dL   CBC Auto Differential    Specimen: Blood   Result Value Ref Range    WBC 7.24 3.40 - 10.80 10*3/mm3    RBC 5.91 (H) 4.14 - 5.80 10*6/mm3    Hemoglobin 16.4 13.0 - 17.7 g/dL    Hematocrit 50.0 37.5 - 51.0 %    MCV 84.6 79.0 - 97.0 fL    MCH 27.7 26.6 - 33.0 pg    MCHC 32.8 31.5 - 35.7 g/dL    RDW 15.2 12.3 - 15.4 %    RDW-SD 46.4 37.0 - 54.0 fl    MPV 11.7 6.0 - 12.0 fL    Platelets 200 140 - 450 10*3/mm3    Neutrophil % 75.5 42.7 - 76.0 %    Lymphocyte % 14.6 (L) 19.6 - 45.3 %    Monocyte % 6.4 5.0 - 12.0 %    Eosinophil % 2.5 0.3 - 6.2 %    Basophil % 0.7 0.0 - 1.5 %    Immature Grans % 0.3 0.0 - 0.5 %    Neutrophils, Absolute 5.47 1.70 - 7.00 10*3/mm3    Lymphocytes, Absolute 1.06 0.70 - 3.10 10*3/mm3    Monocytes, Absolute 0.46 0.10 - 0.90 10*3/mm3    Eosinophils, Absolute 0.18 0.00 - 0.40 10*3/mm3    Basophils, Absolute 0.05 0.00 - 0.20 10*3/mm3    Immature Grans, Absolute 0.02 0.00 - 0.05 10*3/mm3    nRBC 0.0 0.0 - 0.2 /100 WBC   Urinalysis, Microscopic Only - Urine, Clean Catch    Specimen: Urine, Clean Catch   Result Value Ref Range    RBC, UA Too Numerous to Count (A) None Seen, 0-2 /HPF    WBC, UA 0-2 None  Seen, 0-2 /HPF    Bacteria, UA None Seen None Seen /HPF    Squamous Epithelial Cells, UA 0-2 None Seen, 0-2 /HPF    Hyaline Casts, UA None Seen None Seen /LPF    Methodology Automated Microscopy    Green Top (Gel)   Result Value Ref Range    Extra Tube Hold for add-ons.    Lavender Top   Result Value Ref Range    Extra Tube hold for add-on    Gold Top - SST   Result Value Ref Range    Extra Tube Hold for add-ons.    Light Blue Top   Result Value Ref Range    Extra Tube Hold for add-ons.          ED Course  ED Course as of 04/19/23 1251   Wed Apr 19, 2023   1225 CT Abdomen Pelvis Without Contrast  IMPRESSION:     1. Nonobstructing stones in both kidneys     2.Large stool burden [AH]      ED Course User Index  [AH] Marielos Johnston, PA                                           Medical Decision Making  73-year-old male who presents to the ED today for a 1 week history of right flank pain that got worse today.  The patient has a known history of bilateral intrarenal stones.  He is followed by urology here in [place].  Patient received IV Toradol per EMS prior to arrival and that did help with his pain.  Patient was noted to have too numerous to count red blood cells on his urinalysis, no evidence of infection.  CT of the abdomen pelvis showed bilateral intrarenal stones, but no ureteral stones or evidence of obstruction/hydronephrosis.  His renal function was within normal limits.  Discussed with patient about a possible recently passed stone versus small ureteral stone that may not have been seen on CT.  Plan is to discharge home on pain and nausea medication.  He is already on Flomax at home.  He will follow-up outpatient with urology.  He will return to the ED if symptoms change or worsen.    Renal colic on right side: complicated acute illness or injury  Amount and/or Complexity of Data Reviewed  Labs: ordered.  Radiology: ordered. Decision-making details documented in ED Course.      Risk  Prescription drug  management.          Final diagnoses:   Renal colic on right side       ED Disposition  ED Disposition     ED Disposition   Discharge    Condition   Stable    Comment   --             Nabeel Melendrez MD  [address] [facility] [address]  [facility] [address]  [phone]    Schedule an appointment as soon as possible for a visit in 2 days           Medication List      New Prescriptions    ondansetron ODT 4 MG disintegrating tablet  Commonly known as: ZOFRAN-ODT  Place 1 tablet on the tongue Every 6 (Six) Hours As Needed for Nausea or Vomiting.     oxyCODONE-acetaminophen 5-325 MG per tablet  Commonly known as: PERCOCET  Take 1 tablet by mouth Every 6 (Six) Hours As Needed for Severe Pain.        Stop    clarithromycin 500 MG tablet  Commonly known as: BIAXIN           Where to Get Your Medications      These medications were sent to [facility] [id number] - [place], [place] - [address] [place] [address] - [phone]  - [phone] [place] [address] [address] [address]    Phone: [phone]   · ondansetron ODT 4 MG disintegrating tablet  · oxyCODONE-acetaminophen 5-325 MG per tablet          Marielos Johnston PA  04/19/23 8104

## 2023-04-27 ENCOUNTER — TELEPHONE (OUTPATIENT)
Dept: CARDIOLOGY | Facility: CLINIC | Age: 73
End: 2023-04-27
Payer: MEDICARE

## 2023-06-14 ENCOUNTER — HOSPITAL ENCOUNTER (OUTPATIENT)
Dept: GENERAL RADIOLOGY | Facility: HOSPITAL | Age: 73
Discharge: HOME | End: 2023-06-14
Payer: MEDICARE

## 2023-06-14 ENCOUNTER — OFFICE VISIT (OUTPATIENT)
Dept: UROLOGY | Facility: CLINIC | Age: 73
End: 2023-06-14
Payer: MEDICARE

## 2023-06-14 VITALS
HEART RATE: 84 BPM | HEIGHT: 69 IN | BODY MASS INDEX: 30.13 KG/M2 | SYSTOLIC BLOOD PRESSURE: 145 MMHG | DIASTOLIC BLOOD PRESSURE: 84 MMHG | WEIGHT: 203.4 LBS

## 2023-06-14 DIAGNOSIS — R35.0 FREQUENCY OF URINATION: ICD-10-CM

## 2023-06-14 DIAGNOSIS — N20.0 KIDNEY STONE: Primary | ICD-10-CM

## 2023-06-14 LAB
BILIRUB BLD-MCNC: ABNORMAL MG/DL
CLARITY, POC: ABNORMAL
COLOR UR: YELLOW
EXPIRATION DATE: ABNORMAL
GLUCOSE UR STRIP-MCNC: NEGATIVE MG/DL
KETONES UR QL: ABNORMAL
LEUKOCYTE EST, POC: NEGATIVE
Lab: ABNORMAL
NITRITE UR-MCNC: NEGATIVE MG/ML
PH UR: 5.5 [PH] (ref 5–8)
PROT UR STRIP-MCNC: ABNORMAL MG/DL
RBC # UR STRIP: ABNORMAL /UL
SP GR UR: 1.03 (ref 1–1.03)
UROBILINOGEN UR QL: NORMAL

## 2023-06-14 PROCEDURE — 74018 RADEX ABDOMEN 1 VIEW: CPT

## 2023-06-14 PROCEDURE — 74018 RADEX ABDOMEN 1 VIEW: CPT | Performed by: RADIOLOGY

## 2023-06-14 PROCEDURE — 87086 URINE CULTURE/COLONY COUNT: CPT

## 2023-06-14 RX ORDER — KETOROLAC TROMETHAMINE 10 MG/1
10 TABLET, FILM COATED ORAL EVERY 6 HOURS PRN
Qty: 16 TABLET | Refills: 2 | Status: SHIPPED | OUTPATIENT
Start: 2023-06-14

## 2023-06-14 RX ORDER — HYDROCODONE BITARTRATE AND ACETAMINOPHEN 10; 325 MG/1; MG/1
10 TABLET ORAL
COMMUNITY
Start: 2023-06-09

## 2023-06-14 RX ORDER — KETOROLAC TROMETHAMINE 30 MG/ML
30 INJECTION, SOLUTION INTRAMUSCULAR; INTRAVENOUS ONCE
Status: COMPLETED | OUTPATIENT
Start: 2023-06-14 | End: 2023-06-14

## 2023-06-14 RX ADMIN — KETOROLAC TROMETHAMINE 30 MG: 30 INJECTION, SOLUTION INTRAMUSCULAR; INTRAVENOUS at 11:55

## 2023-06-14 NOTE — PROGRESS NOTES
"Chief Complaint:    Chief Complaint   Patient presents with    Nephrolithiasis     Year Follow up     Flank Pain    Blood in Urine       Vital Signs:   /84   Pulse 84   Ht 175.3 cm (69\")   Wt 92.3 kg (203 lb 6.4 oz)   BMI 30.04 kg/m²   Body mass index is 30.04 kg/m².      HPI:  Ye Butler is a 73 y.o. male who presents today for follow up    History of Present Illness  Presents to the clinic for a follow-up.  He was last seen by Dr. Melendrez in 2021 for nephrolithiasis.  He is underwent a extracortical shockwave lithotripsy in the past.  Significant stone burden's.  He had a recent CT scan completed in April that showed bilateral nonobstructing intrarenal stones.  His largest on the right was roughly 10 mm.  Largest on the left was roughly 13 mm.  Does report significant right-sided back and flank pain.  Denies any left-sided back or flank pain.  He also endorses gross hematuria, frequency, urgency, dysuria..  UA today shows 3+ blood.  Repeat KUB today confirms bilateral intrarenal stones.  He also has notable constipation.    Past Medical History:  Past Medical History:   Diagnosis Date    Appendicitis     Arthritis     COPD (chronic obstructive pulmonary disease)     Coronary artery disease     COVID-19 2021    Diabetes mellitus     Disease of thyroid gland     Diverticulitis     Elevated cholesterol     Emphysema lung     Emphysema lung     Emphysema lung     GERD (gastroesophageal reflux disease)     Hyperlipidemia     Hypertension     Kidney stone     MI (myocardial infarction)     PONV (postoperative nausea and vomiting)     Stomach ulcer     Stroke 2018       Current Meds:  Current Outpatient Medications   Medication Sig Dispense Refill    amLODIPine (NORVASC) 2.5 MG tablet 1 tablet Every Night.      aspirin 81 MG tablet Take 1 tablet by mouth Daily. 30 tablet 11    Docusate Sodium 100 MG capsule Take 100 mg by mouth As Needed for Constipation.      famotidine (PEPCID) 40 MG tablet Take 1 " tablet by mouth Daily.      glimepiride (AMARYL) 4 MG tablet Take 1 tablet by mouth Daily.      HYDROcodone-acetaminophen (NORCO)  MG per tablet 10 tablets.      levothyroxine (SYNTHROID, LEVOTHROID) 50 MCG tablet Take 1 tablet by mouth Daily.  0    Linzess 145 MCG capsule capsule       lisinopril (PRINIVIL,ZESTRIL) 20 MG tablet Take 1 tablet by mouth 2 (Two) Times a Day. 60 tablet 5    memantine (NAMENDA) 5 MG tablet Take 1 tablet by mouth Daily.      metFORMIN (GLUCOPHAGE) 500 MG tablet Take 1 tablet by mouth 2 (Two) Times a Day With Meals.  0    metoprolol tartrate (LOPRESSOR) 25 MG tablet 1 tablet 2 (Two) Times a Day.      montelukast (SINGULAIR) 10 MG tablet Take 1 tablet by mouth Every Night.      nitroglycerin (NITROSTAT) 0.4 MG SL tablet 1 under the tongue as needed for angina, may repeat q5mins for up three doses 100 tablet 11    NON FORMULARY Morphine pump      OMEPRAZOLE PO Take 40 mg by mouth Daily.      ondansetron ODT (ZOFRAN-ODT) 4 MG disintegrating tablet Place 1 tablet on the tongue Every 6 (Six) Hours As Needed for Nausea or Vomiting. 10 tablet 0    oxyCODONE-acetaminophen (PERCOCET) 5-325 MG per tablet Take 1 tablet by mouth Every 6 (Six) Hours As Needed for Severe Pain. 10 tablet 0    pantoprazole (PROTONIX) 40 MG EC tablet Take 1 tablet by mouth Daily.      pravastatin (PRAVACHOL) 40 MG tablet TAKE ONE TABLET BY MOUTH DAILY 90 tablet 3    sertraline (ZOLOFT) 25 MG tablet Take 1 tablet by mouth Daily.      tamsulosin (FLOMAX) 0.4 MG capsule 24 hr capsule 1 capsule Daily.      Testosterone Cypionate (DEPOTESTOTERONE CYPIONATE) 200 MG/ML injection Inject 1 mL into the appropriate muscle as directed by prescriber Every 21 (Twenty-One) Days.      Zolpidem Tartrate 5 MG/ACT solution Take  by mouth.      ketorolac (TORADOL) 10 MG tablet Take 1 tablet by mouth Every 6 (Six) Hours As Needed for Moderate Pain. 16 tablet 2     No current facility-administered medications for this visit.         Allergies:   Allergies   Allergen Reactions    Claritin [Loratadine] Other (See Comments)     Possible kidney issues    Codeine Other (See Comments)     Kidney issues         Past Surgical History:  Past Surgical History:   Procedure Laterality Date    APPENDECTOMY      CARDIAC CATHETERIZATION      COLONOSCOPY      CORONARY STENT PLACEMENT      ENDOSCOPY      EXTRACORPOREAL SHOCK WAVE LITHOTRIPSY (ESWL) Right 7/23/2021    Procedure: EXTRACORPOREAL SHOCKWAVE LITHOTRIPSY;  Surgeon: Nabeel Melendrez MD;  Location: [facility];  Service: Urology;  Laterality: Right;    PAIN PUMP TRIAL      TURP / TRANSURETHRAL INCISION / DRAINAGE PROSTATE         Social History:  Social History     Socioeconomic History    Marital status:    Tobacco Use    Smoking status: Former     Years: 20.00     Types: Cigarettes    Smokeless tobacco: Never    Tobacco comments:     quit 20+ yrs ago    Vaping Use    Vaping Use: Never used   Substance and Sexual Activity    Alcohol use: No     Comment: former use 20 + yrs ago     Drug use: No    Sexual activity: Defer       Family History:  Family History   Problem Relation Age of Onset    Cancer Mother     Heart disease Father     Stroke Father        Review of Systems:  Review of Systems   Constitutional:  Positive for fatigue. Negative for chills and fever.   HENT:  Negative for congestion and sinus pressure.    Respiratory:  Negative for chest tightness and shortness of breath.    Cardiovascular:  Negative for chest pain.   Gastrointestinal:  Positive for constipation. Negative for abdominal pain, diarrhea, nausea and vomiting.   Genitourinary:  Positive for dysuria, flank pain, frequency, hematuria and urgency.   Musculoskeletal:  Positive for back pain. Negative for neck pain.   Skin:  Negative for rash.   Neurological:  Negative for dizziness and headaches.   Hematological:  Bruises/bleeds easily.   Psychiatric/Behavioral:  The patient is not nervous/anxious.      Physical  Exam:  Physical Exam  Constitutional:       General: He is not in acute distress.     Appearance: Normal appearance.   HENT:      Head: Normocephalic and atraumatic.      Nose: Nose normal.      Mouth/Throat:      Mouth: Mucous membranes are moist.   Eyes:      Conjunctiva/sclera: Conjunctivae normal.   Cardiovascular:      Rate and Rhythm: Normal rate and regular rhythm.      Pulses: Normal pulses.      Heart sounds: Normal heart sounds.   Pulmonary:      Effort: Pulmonary effort is normal.      Breath sounds: Normal breath sounds.   Abdominal:      General: Bowel sounds are normal.      Palpations: Abdomen is soft.      Tenderness: There is no right CVA tenderness or left CVA tenderness.   Musculoskeletal:         General: Normal range of motion.      Cervical back: Normal range of motion.   Skin:     General: Skin is warm.   Neurological:      General: No focal deficit present.      Mental Status: He is alert and oriented to person, place, and time.   Psychiatric:         Mood and Affect: Mood normal.         Behavior: Behavior normal.         Thought Content: Thought content normal.         Judgment: Judgment normal.       IPSS Questionnaire (AUA-7):  IPSS Questionnaire (AUA-7):                  IPSS Questionnaire (AUA-7):  Over the past month…    1)  Incomplete Emptying  How often have you had a sensation of not emptying your bladder?  2 - Less than half the time   2)  Frequency  How often have you had to urinate less than every two hours? 2 - Less than half the time   3)  Intermittency  How often have you found you stopped and started again several times when you urinated?  1 - Less than 1 time in 5   4) Urgency  How often have you found it difficult to postpone urination?  2 - Less than half the time   5) Weak Stream  How often have you had a weak urinary stream?  3 - About half the time   6) Straining  How often have you had to push or strain to begin urination?  1 - Less than 1 time in 5   7) Nocturia  How  many times did you typically get up at night to urinate?  2 - 2 times   Total Score:  13   The International Prostate Symptom Score (IPSS) is used to screen, diagnose, track symptoms of benign prostatic hyperplasia (BPH).    0-7 pts (Mild Symptoms)  / 8-19 pts (Moderate) / 20-35 (Severe)    Quality of life due to urinary symptoms:  If you were to spend the rest of your life with your urinary condition the way it is now, how would you feel about that? 3-Mixed   Urine Leakage (Incontinence) 1-Mild (A few drops a day, no pad use)       Recent Image (CT and/or KUB):   CT Abdomen and Pelvis: No results found for this or any previous visit.     CT Stone Protocol: Results for orders placed during the hospital encounter of 07/19/21    CT Abdomen Pelvis Stone Protocol    Narrative  EXAM:  CT Abdomen and Pelvis Without Intravenous Contrast    EXAM DATE:  7/19/2021 12:39 PM    CLINICAL HISTORY:  Flank pain, kidney stone suspected    TECHNIQUE:  Axial computed tomography images of the abdomen and pelvis without  intravenous contrast.  Sagittal and coronal reformatted images were  created and reviewed.  This CT exam was performed using one or more of  the following dose reduction techniques:  automated exposure control,  adjustment of the mA and/or kV according to patient size, and/or use of  iterative reconstruction technique.    COMPARISON:  12/07/2020    FINDINGS:  LUNG BASES:  Unremarkable.  No mass.  No consolidation.    ABDOMEN:  LIVER:  Unremarkable.  GALLBLADDER AND BILE DUCTS:  Unremarkable.  No calcified stones.  No  ductal dilation.  PANCREAS:  Unremarkable.  No ductal dilation.  SPLEEN:  Unremarkable.  No splenomegaly.  ADRENALS:  Unremarkable.  No mass.  KIDNEYS AND URETERS:  Right proximal ureteral calculus measuring about  1.3 cm causes moderate right hydronephrosis.  STOMACH AND BOWEL:  Unremarkable.  No obstruction.  No mucosal  thickening.    PELVIS:  APPENDIX:  No findings to suggest acute  appendicitis.  BLADDER:  Unremarkable.  No stones.  REPRODUCTIVE:  Unremarkable as visualized.    ABDOMEN and PELVIS:  INTRAPERITONEAL SPACE:  Unremarkable.  No free air.  No significant  fluid collection.  BONES/JOINTS:  No acute fracture.  No dislocation.  SOFT TISSUES:  Unremarkable.  VASCULATURE:  Unremarkable.  No abdominal aortic aneurysm.  LYMPH NODES:  Unremarkable.  No enlarged lymph nodes.    Impression  Right proximal ureteral calculus measuring about 1.3 cm causes  moderate right hydronephrosis.    This report was finalized on 7/19/2021 12:55 PM by Dr. Mario Beavers MD.     KUB: Results for orders placed during the hospital encounter of 09/03/21    XR Abdomen KUB    Narrative  EXAM:  XR Abdomen, 1 View    EXAM DATE:  9/3/2021 4:13 PM    CLINICAL HISTORY:  stone; N20.0-Calculus of kidney    TECHNIQUE:  Frontal supine view of the abdomen/pelvis.    COMPARISON:  07/30/2021    FINDINGS:  GASTROINTESTINAL TRACT:  Gaseous gastric distention.  ORGANS:  Possible right renal calculus measuring 4 mm.  BONES/JOINTS:  Unremarkable.    Impression  Possible right renal calculus measuring 4 mm.    This report was finalized on 9/3/2021 5:11 PM by Dr. Mario Beavers MD.       Labs:  Brief Urine Lab Results  (Last result in the past 365 days)        Color   Clarity   Blood   Leuk Est   Nitrite   Protein   CREAT   Urine HCG        06/14/23 1128 Yellow   Cloudy   3+   Negative   Negative   1+                 Office Visit on 06/14/2023   Component Date Value Ref Range Status    Color 06/14/2023 Yellow  Yellow, Straw, Dark Yellow, Maggy Final    Clarity, UA 06/14/2023 Cloudy (A)  Clear Final    Specific Gravity  06/14/2023 1.030  1.005 - 1.030 Final    pH, Urine 06/14/2023 5.5  5.0 - 8.0 Final    Leukocytes 06/14/2023 Negative  Negative Final    Nitrite, UA 06/14/2023 Negative  Negative Final    Protein, POC 06/14/2023 1+ (A)  Negative mg/dL Final    Glucose, UA 06/14/2023 Negative  Negative mg/dL Final    Ketones, UA  06/14/2023 1+ (A)  Negative Final    Urobilinogen, UA 06/14/2023 Normal  Normal, 0.2 E.U./dL Final    Bilirubin 06/14/2023 1 mg/dL (A)  Negative Final    Blood, UA 06/14/2023 3+ (A)  Negative Final    Lot Number 06/14/2023 n   Final    Expiration Date 06/14/2023 n   Final    Urine Culture 06/14/2023 No growth   Preliminary   Admission on 04/19/2023, Discharged on 04/19/2023   Component Date Value Ref Range Status    Glucose 04/19/2023 160 (H)  65 - 99 mg/dL Final    BUN 04/19/2023 10  8 - 23 mg/dL Final    Creatinine 04/19/2023 1.03  0.76 - 1.27 mg/dL Final    Sodium 04/19/2023 139  136 - 145 mmol/L Final    Potassium 04/19/2023 4.7  3.5 - 5.2 mmol/L Final    Specimen hemolyzed.  Results may be affected.1+ Hemolysis        Chloride 04/19/2023 106  98 - 107 mmol/L Final    CO2 04/19/2023 22.4  22.0 - 29.0 mmol/L Final    Calcium 04/19/2023 8.7  8.6 - 10.5 mg/dL Final    Total Protein 04/19/2023 6.5  6.0 - 8.5 g/dL Final    Albumin 04/19/2023 3.6  3.5 - 5.2 g/dL Final    ALT (SGPT) 04/19/2023 12  1 - 41 U/L Final    Specimen hemolyzed.  Results may be affected.    AST (SGOT) 04/19/2023 25  1 - 40 U/L Final    Alkaline Phosphatase 04/19/2023 74  39 - 117 U/L Final    Total Bilirubin 04/19/2023 0.3  0.0 - 1.2 mg/dL Final    Globulin 04/19/2023 2.9  gm/dL Final    A/G Ratio 04/19/2023 1.2  g/dL Final    BUN/Creatinine Ratio 04/19/2023 9.7  7.0 - 25.0 Final    Anion Gap 04/19/2023 10.6  5.0 - 15.0 mmol/L Final    eGFR 04/19/2023 77.7  >60.0 mL/min/1.73 Final    Lipase 04/19/2023 17  13 - 60 U/L Final    Color, UA 04/19/2023 Dark Yellow (A)  Yellow, Straw Final    Appearance, UA 04/19/2023 Clear  Clear Final    pH, UA 04/19/2023 5.5  5.0 - 8.0 Final    Specific Gravity, UA 04/19/2023 1.025  1.005 - 1.030 Final    Glucose, UA 04/19/2023 100 mg/dL (Trace) (A)  Negative Final    Ketones, UA 04/19/2023 Trace (A)  Negative Final    Bilirubin, UA 04/19/2023 Negative  Negative Final    Blood, UA 04/19/2023 Large (3+) (A)   Negative Final    Protein, UA 04/19/2023 30 mg/dL (1+) (A)  Negative Final    Leuk Esterase, UA 04/19/2023 Negative  Negative Final    Nitrite, UA 04/19/2023 Negative  Negative Final    Urobilinogen, UA 04/19/2023 0.2 E.U./dL  0.2 - 1.0 E.U./dL Final    Sed Rate 04/19/2023 2  0 - 20 mm/hr Final    C-Reactive Protein 04/19/2023 <0.30  0.00 - 0.50 mg/dL Final    Magnesium 04/19/2023 1.6  1.6 - 2.4 mg/dL Final    WBC 04/19/2023 7.24  3.40 - 10.80 10*3/mm3 Final    RBC 04/19/2023 5.91 (H)  4.14 - 5.80 10*6/mm3 Final    Hemoglobin 04/19/2023 16.4  13.0 - 17.7 g/dL Final    Hematocrit 04/19/2023 50.0  37.5 - 51.0 % Final    MCV 04/19/2023 84.6  79.0 - 97.0 fL Final    MCH 04/19/2023 27.7  26.6 - 33.0 pg Final    MCHC 04/19/2023 32.8  31.5 - 35.7 g/dL Final    RDW 04/19/2023 15.2  12.3 - 15.4 % Final    RDW-SD 04/19/2023 46.4  37.0 - 54.0 fl Final    MPV 04/19/2023 11.7  6.0 - 12.0 fL Final    Platelets 04/19/2023 200  140 - 450 10*3/mm3 Final    Neutrophil % 04/19/2023 75.5  42.7 - 76.0 % Final    Lymphocyte % 04/19/2023 14.6 (L)  19.6 - 45.3 % Final    Monocyte % 04/19/2023 6.4  5.0 - 12.0 % Final    Eosinophil % 04/19/2023 2.5  0.3 - 6.2 % Final    Basophil % 04/19/2023 0.7  0.0 - 1.5 % Final    Immature Grans % 04/19/2023 0.3  0.0 - 0.5 % Final    Neutrophils, Absolute 04/19/2023 5.47  1.70 - 7.00 10*3/mm3 Final    Lymphocytes, Absolute 04/19/2023 1.06  0.70 - 3.10 10*3/mm3 Final    Monocytes, Absolute 04/19/2023 0.46  0.10 - 0.90 10*3/mm3 Final    Eosinophils, Absolute 04/19/2023 0.18  0.00 - 0.40 10*3/mm3 Final    Basophils, Absolute 04/19/2023 0.05  0.00 - 0.20 10*3/mm3 Final    Immature Grans, Absolute 04/19/2023 0.02  0.00 - 0.05 10*3/mm3 Final    nRBC 04/19/2023 0.0  0.0 - 0.2 /100 WBC Final    Extra Tube 04/19/2023 Hold for add-ons.   Final    Auto resulted.    Extra Tube 04/19/2023 hold for add-on   Final    Auto resulted    Extra Tube 04/19/2023 Hold for add-ons.   Final    Auto resulted.    Extra Tube  04/19/2023 Hold for add-ons.   Final    Auto resulted    RBC, UA 04/19/2023 Too Numerous to Count (A)  None Seen, 0-2 /HPF Final    WBC, UA 04/19/2023 0-2  None Seen, 0-2 /HPF Final    Urine culture not indicated.    Bacteria, UA 04/19/2023 None Seen  None Seen /HPF Final    Squamous Epithelial Cells, UA 04/19/2023 0-2  None Seen, 0-2 /HPF Final    Hyaline Casts, UA 04/19/2023 None Seen  None Seen /LPF Final    Methodology 04/19/2023 Automated Microscopy   Final   Hospital Outpatient Visit on 04/04/2023   Component Date Value Ref Range Status    Target HR (85%) 04/04/2023 127  bpm Final    Max. Pred. HR (100%) 04/04/2023 149  bpm Final    LVIDd 04/04/2023 5.3  cm Final    LVIDs 04/04/2023 3.8  cm Final    IVSd 04/04/2023 1.26  cm Final    LVPWd 04/04/2023 1.26  cm Final    FS 04/04/2023 27.0  % Final    IVS/LVPW 04/04/2023 1.00  cm Final    ESV(cubed) 04/04/2023 56.3  ml Final    EDV(cubed) 04/04/2023 144.9  ml Final    LV mass(C)d 04/04/2023 270.0  grams Final    MV E max monster 04/04/2023 67.4  cm/sec Final    MV A max monster 04/04/2023 88.7  cm/sec Final    MV dec time 04/04/2023 0.25  msec Final    MV E/A 04/04/2023 0.76   Final    Med Peak E' Monster 04/04/2023 6.5  cm/sec Final    Lat Peak E' Monster 04/04/2023 7.4  cm/sec Final    Avg E/e' ratio 04/04/2023 9.70   Final    RVIDd 04/04/2023 3.5  cm Final    TAPSE (>1.6) 04/04/2023 1.58  cm Final    RV S' 04/04/2023 14.1  cm/sec Final    LA dimension (2D)  04/04/2023 4.5  cm Final    LV V1 max 04/04/2023 101.8  cm/sec Final    LV V1 max PG 04/04/2023 4.1  mmHg Final    LV V1 mean PG 04/04/2023 1.90  mmHg Final    LV V1 VTI 04/04/2023 22.9  cm Final    Ao pk monster 04/04/2023 145.9  cm/sec Final    Ao max PG 04/04/2023 8.5  mmHg Final    Ao mean PG 04/04/2023 4.5  mmHg Final    Ao V2 VTI 04/04/2023 31.2  cm Final    MV max PG 04/04/2023 3.5  mmHg Final    MV mean PG 04/04/2023 1.36  mmHg Final    MV V2 VTI 04/04/2023 35.7  cm Final    MV P1/2t 04/04/2023 88.2  msec Final     MVA(P1/2t) 04/04/2023 2.50  cm2 Final    MV dec slope 04/04/2023 292.1  cm/sec2 Final    TR max alivia 04/04/2023 230.9  cm/sec Final    TR max PG 04/04/2023 21.3  mmHg Final    RVSP(TR) 04/04/2023 29.3  mmHg Final    RAP systole 04/04/2023 8.0  mmHg Final    RV V1 max PG 04/04/2023 1.70  mmHg Final    RV V1 max 04/04/2023 65.3  cm/sec Final    RV V1 VTI 04/04/2023 15.3  cm Final    PA V2 max 04/04/2023 99.5  cm/sec Final    Ao root diam 04/04/2023 3.4  cm Final    ACS 04/04/2023 1.87  cm Final    Sinus 04/04/2023 3.0  cm Final    Echo EF Estimated 04/04/2023 52.0  % Final    Dimensionless Index 04/04/2023 0.70  (DI) Final   Hospital Outpatient Visit on 04/04/2023   Component Date Value Ref Range Status    Target HR (85%) 04/04/2023 127  bpm Final    Max. Pred. HR (100%) 04/04/2023 149  bpm Final     CV REST NUCLEAR ISOTOPE DOSE 04/04/2023 10.0  mCi Final     CV STRESS NUCLEAR ISOTOPE DOSE 04/04/2023 30.0  mCi Final     CV STRESS PROTOCOL 1 04/04/2023 Pharmacologic   Final    Stage 1 04/04/2023 1   Final    Duration Min Stage 1 04/04/2023 0   Final    Duration Sec Stage 1 04/04/2023 10   Final    Stress Dose Regadenoson Stage 1 04/04/2023 0.4   Final    Stress Comments Stage 1 04/04/2023 10 sec bolus injection   Final    Baseline HR 04/04/2023 62  bpm Final    Baseline BP 04/04/2023 147/81  mmHg Final    Peak HR 04/04/2023 70  bpm Final    Percent Max Pred HR 04/04/2023 46.98  % Final    Percent Target HR 04/04/2023 55  % Final    Peak BP 04/04/2023 169/87  mmHg Final    Recovery HR 04/04/2023 76  bpm Final    Recovery BP 04/04/2023 170/85  mmHg Final        Procedure: None  Procedures     I have reviewed and agree with the above PMH, PSH, FMH, social history, medications, allergies, and labs.     Assessment/Plan:   Problem List Items Addressed This Visit          Genitourinary and Reproductive     Kidney stone - Primary    Overview     Added automatically from request for surgery [id number]         Relevant  Medications    ketorolac (TORADOL) 10 MG tablet    ketorolac (TORADOL) injection 30 mg (Completed)    Other Relevant Orders    XR Abdomen KUB (Completed)    Case Request (Completed)    CBC (No Diff)    Basic Metabolic Panel     Other Visit Diagnoses       Frequency of urination        Relevant Orders    POC Urinalysis Dipstick, Automated (Completed)    Urine Culture - Urine, Urine, Random Void (Completed)            Health Maintenance:   Health Maintenance Due   Topic Date Due    COLORECTAL CANCER SCREENING  Never done    Pneumococcal Vaccine 65+ (1 - PCV) Never done    HEPATITIS C SCREENING  Never done    ANNUAL WELLNESS VISIT  Never done    LIPID PANEL  05/03/2019    COVID-19 Vaccine (6 - Moderna series) 03/22/2023        Smoking Counseling:Former smoker.  Never used smokeless tobacco.    Urine Incontinence: Patient reports that he is not currently experiencing any symptoms of urinary incontinence.    Patient was given instructions and counseling regarding his condition or for health maintenance advice. Please see specific information pulled into the AVS if appropriate.    Patient Education:   Renal calculus - It was discussed with the patient the presence of a multiple nonobstructing bilateral intrarenal calculi first found on CT in April We discussed the various therapeutic options available including percutaneous nephrostolithotomy, ureteroscopy and extracorporeal shockwave  lithotripsy.  We discussed the risks of lithotripsy including the passage of stones leading to a 3% chance of Steinstrasse or a large string of stones in the distal ureter. In this incidence the patient was informed that a ureteroscopy is indicated for obstructing fragments.  Patient was informed of an extremely rare incidence of renal hematoma and the significance of this.  Patient was educated on percutaneous nephrostolithotomy and its use as well as the risks and benefits such as the need for postoperative hospitalization, and the risk  of damage to the kidney and the remote risk of a nephrectomy.  We also discussed the use of ureteroscopy in the upper tracts and its decreased success rate to completely remove the stones likely causing stent placement leading to an additional procedure for removal.  We discussed the absolute relative indicators for intervention including the presence of sepsis and uncontrollable pain leading to need for urgent intervention.  We discussed placement of a stent if indicated and the management of the stent as well.  Given patient's significant pain I am recommending extracortical shockwave lithotripsy in the outpatient setting.  I will give the patient a shot of Toradol in office today.  I will send in Toradol 10 mg tablets to take as needed for moderate to severe pain.  Advised him to not take any longer than 5 consecutive days and to not take with other anti-inflammatory/NSAID medications.  Also advised patient to increase water intake 2 to 3 L/day.  Educated him to discontinue the use of soda/sweet or unsweet tea.  I will send the patient's urine off for culture.  Vies him return to the clinic sooner if needed.  Patient verbalized understanding and agreed to plan of care.    Visit Diagnoses:    ICD-10-CM ICD-9-CM   1. Kidney stone  N20.0 592.0   2. Frequency of urination  R35.0 788.41       Meds Ordered During Visit:  New Medications Ordered This Visit   Medications    ketorolac (TORADOL) 10 MG tablet     Sig: Take 1 tablet by mouth Every 6 (Six) Hours As Needed for Moderate Pain.     Dispense:  16 tablet     Refill:  2    ketorolac (TORADOL) injection 30 mg       Follow Up Appointment: [facility] on July 7  No follow-ups on file.      This document has been electronically signed by Domiinck Porter PA-C   Selina 15, 2023 08:44 EDT    Part of this note may be an electronic transcription/translation of spoken language to printed text using the Dragon Dictation System.

## 2023-06-15 LAB — BACTERIA SPEC AEROBE CULT: NO GROWTH

## 2023-06-15 RX ORDER — GENTAMICIN SULFATE 80 MG/100ML
80 INJECTION, SOLUTION INTRAVENOUS ONCE
OUTPATIENT
Start: 2023-06-15 | End: 2023-06-15

## 2023-07-28 ENCOUNTER — OFFICE VISIT (OUTPATIENT)
Dept: UROLOGY | Facility: CLINIC | Age: 73
End: 2023-07-28
Payer: MEDICARE

## 2023-07-28 ENCOUNTER — HOSPITAL ENCOUNTER (OUTPATIENT)
Dept: GENERAL RADIOLOGY | Facility: HOSPITAL | Age: 73
Discharge: HOME | End: 2023-07-28
Admitting: UROLOGY
Payer: MEDICARE

## 2023-07-28 VITALS
SYSTOLIC BLOOD PRESSURE: 165 MMHG | HEIGHT: 69 IN | HEART RATE: 63 BPM | BODY MASS INDEX: 30.33 KG/M2 | DIASTOLIC BLOOD PRESSURE: 84 MMHG | WEIGHT: 204.8 LBS

## 2023-07-28 DIAGNOSIS — N20.0 KIDNEY STONE: Primary | ICD-10-CM

## 2023-07-28 DIAGNOSIS — Z12.5 SCREENING PSA (PROSTATE SPECIFIC ANTIGEN): ICD-10-CM

## 2023-07-28 DIAGNOSIS — N20.0 KIDNEY STONE: ICD-10-CM

## 2023-07-28 PROCEDURE — 74018 RADEX ABDOMEN 1 VIEW: CPT | Performed by: RADIOLOGY

## 2023-07-28 PROCEDURE — 74018 RADEX ABDOMEN 1 VIEW: CPT

## 2023-07-28 PROCEDURE — 80053 COMPREHEN METABOLIC PANEL: CPT

## 2023-07-28 RX ORDER — SIMVASTATIN 40 MG
TABLET ORAL
COMMUNITY

## 2023-07-28 RX ORDER — METOPROLOL SUCCINATE 25 MG/1
TABLET, EXTENDED RELEASE ORAL
COMMUNITY

## 2023-07-28 RX ORDER — TRAZODONE HYDROCHLORIDE 50 MG/1
TABLET ORAL
COMMUNITY

## 2023-07-28 NOTE — PROGRESS NOTES
"Chief Complaint:    Chief Complaint   Patient presents with    kidney stone        Vital Signs:   /84 (BP Location: Left arm, Patient Position: Sitting, Cuff Size: Adult)   Pulse 63   Ht 175.3 cm (69.02\")   Wt 92.9 kg (204 lb 12.8 oz)   BMI 30.23 kg/m²   Body mass index is 30.23 kg/m².      HPI:  Ye Butler is a 73 y.o. male who presents today for follow up    History of Present Illness  Mr. Butler presents to the clinic with his family for 3-week postop follow-up.  Patient underwent a right extracorporal shockwave lithotripsy for a painful right UPJ stone.  Patient reports since surgery he has had no complications.  He denies any significant right or left-sided back/flank pain.  Repeat KUB today shows good resolution of previous to large right ureteral calculus.  Does have a small fragmentation in the right kidney.  He still has a persistently large 14.6 mm left ureteral calculus.  He denies any symptoms at this time.  He is doing well.    Past Medical History:  Past Medical History:   Diagnosis Date    Appendicitis     Arthritis     COPD (chronic obstructive pulmonary disease)     Coronary artery disease     COVID-19 2021    Diabetes mellitus     Disease of thyroid gland     Diverticulitis     Elevated cholesterol     Emphysema lung     Emphysema lung     Emphysema lung     GERD (gastroesophageal reflux disease)     Hiatal hernia     Hyperlipidemia     Hypertension     Kidney stone     MI (myocardial infarction)     PONV (postoperative nausea and vomiting)     Stomach ulcer     Stroke 2018       Current Meds:  Current Outpatient Medications   Medication Sig Dispense Refill    aspirin 81 MG tablet Take 1 tablet by mouth Daily. 30 tablet 11    Docusate Sodium 100 MG capsule Take 100 mg by mouth As Needed for Constipation.      glimepiride (AMARYL) 4 MG tablet Take 1 tablet by mouth Daily.      ketorolac (TORADOL) 10 MG tablet Take 1 tablet by mouth Every 6 (Six) Hours As Needed for Moderate Pain. " 16 tablet 2    levothyroxine (SYNTHROID, LEVOTHROID) 50 MCG tablet Take 1 tablet by mouth Daily.  0    Linzess 145 MCG capsule capsule       lisinopril (PRINIVIL,ZESTRIL) 20 MG tablet Take 1 tablet by mouth 2 (Two) Times a Day. 60 tablet 5    memantine (NAMENDA) 5 MG tablet Take 1 tablet by mouth Daily.      metFORMIN (GLUCOPHAGE) 500 MG tablet Take 1 tablet by mouth 2 (Two) Times a Day With Meals.  0    metoprolol succinate XL (TOPROL-XL) 25 MG 24 hr tablet Take 1 tablet every day by oral route.      montelukast (SINGULAIR) 10 MG tablet Take 1 tablet by mouth Every Night.      nitroglycerin (NITROSTAT) 0.4 MG SL tablet 1 under the tongue as needed for angina, may repeat q5mins for up three doses 100 tablet 11    NON FORMULARY Morphine pump      Omeprazole Magnesium (PRILOSEC PO) 40 mg 1xday      ondansetron ODT (ZOFRAN-ODT) 4 MG disintegrating tablet Place 1 tablet on the tongue Every 6 (Six) Hours As Needed for Nausea or Vomiting. 10 tablet 0    sertraline (ZOLOFT) 25 MG tablet Take 1 tablet by mouth Daily.      simvastatin (ZOCOR) 40 MG tablet Take 1 tablet every day by oral route.      tamsulosin (FLOMAX) 0.4 MG capsule 24 hr capsule 1 capsule Daily.      Testosterone Cypionate (DEPOTESTOTERONE CYPIONATE) 200 MG/ML injection Inject 1 mL into the appropriate muscle as directed by prescriber Every 21 (Twenty-One) Days.      traZODone (DESYREL) 50 MG tablet       Zolpidem Tartrate 5 MG/ACT solution Take  by mouth.      HYDROcodone-acetaminophen (NORCO)  MG per tablet 10 tablets. (Patient not taking: Reported on 7/28/2023)      oxyCODONE-acetaminophen (PERCOCET) 5-325 MG per tablet Take 1 tablet by mouth Every 6 (Six) Hours As Needed for Severe Pain. (Patient not taking: Reported on 7/28/2023) 10 tablet 0     No current facility-administered medications for this visit.        Allergies:   Allergies   Allergen Reactions    Claritin [Loratadine] Other (See Comments)     Possible kidney issues    Codeine Other  (See Comments)     Kidney issues         Past Surgical History:  Past Surgical History:   Procedure Laterality Date    APPENDECTOMY      CARDIAC CATHETERIZATION      COLONOSCOPY      CORONARY STENT PLACEMENT      ENDOSCOPY      EXTRACORPOREAL SHOCK WAVE LITHOTRIPSY (ESWL) Right 7/23/2021    Procedure: EXTRACORPOREAL SHOCKWAVE LITHOTRIPSY;  Surgeon: Nabeel Melendrez MD;  Location: [facility];  Service: Urology;  Laterality: Right;    EXTRACORPOREAL SHOCK WAVE LITHOTRIPSY (ESWL) Right 7/7/2023    Procedure: EXTRACORPOREAL SHOCKWAVE LITHOTRIPSY;  Surgeon: Nabeel Melendrez MD;  Location: [facility];  Service: Urology;  Laterality: Right;    PAIN PUMP TRIAL      TURP / TRANSURETHRAL INCISION / DRAINAGE PROSTATE         Social History:  Social History     Socioeconomic History    Marital status:    Tobacco Use    Smoking status: Former     Years: 20.00     Types: Cigarettes    Smokeless tobacco: Never    Tobacco comments:     quit 20+ yrs ago    Vaping Use    Vaping Use: Never used   Substance and Sexual Activity    Alcohol use: No     Comment: former use 20 + yrs ago     Drug use: No    Sexual activity: Defer       Family History:  Family History   Problem Relation Age of Onset    Cancer Mother     Heart disease Father     Stroke Father        Review of Systems:  Review of Systems   Constitutional:  Positive for fatigue. Negative for chills, fever and unexpected weight change.   HENT:  Negative for congestion and sinus pressure.    Respiratory:  Negative for chest tightness and shortness of breath.    Cardiovascular:  Negative for chest pain.   Gastrointestinal:  Negative for abdominal pain, constipation, diarrhea, nausea and vomiting.   Genitourinary:  Negative for difficulty urinating, dysuria, flank pain, frequency, hematuria and urgency.   Musculoskeletal:  Negative for back pain and neck pain.   Skin:  Negative for rash.   Neurological:  Negative for dizziness and headaches.   Hematological:   Bruises/bleeds easily.   Psychiatric/Behavioral:  Negative for confusion and suicidal ideas. The patient is not nervous/anxious.      Physical Exam:  Physical Exam  Constitutional:       General: He is not in acute distress.     Appearance: Normal appearance.   HENT:      Head: Normocephalic.      Mouth/Throat:      Mouth: Mucous membranes are moist.      Pharynx: Oropharynx is clear.   Eyes:      Extraocular Movements: Extraocular movements intact.      Conjunctiva/sclera: Conjunctivae normal.   Cardiovascular:      Rate and Rhythm: Normal rate and regular rhythm.      Pulses: Normal pulses.      Heart sounds: Normal heart sounds.   Pulmonary:      Effort: Pulmonary effort is normal.      Breath sounds: Normal breath sounds.   Abdominal:      General: Abdomen is flat. Bowel sounds are normal.      Palpations: Abdomen is soft.      Tenderness: There is no right CVA tenderness or left CVA tenderness.   Genitourinary:     Rectum: Normal.   Musculoskeletal:      Cervical back: Normal range of motion.   Skin:     General: Skin is warm.   Neurological:      General: No focal deficit present.      Mental Status: He is alert and oriented to person, place, and time.   Psychiatric:         Mood and Affect: Mood normal.         Thought Content: Thought content normal.         Judgment: Judgment normal.         Recent Image (CT and/or KUB):   CT Abdomen and Pelvis: No results found for this or any previous visit.     CT Stone Protocol: Results for orders placed during the hospital encounter of 07/19/21    CT Abdomen Pelvis Stone Protocol    Narrative  EXAM:  CT Abdomen and Pelvis Without Intravenous Contrast    EXAM DATE:  7/19/2021 12:39 PM    CLINICAL HISTORY:  Flank pain, kidney stone suspected    TECHNIQUE:  Axial computed tomography images of the abdomen and pelvis without  intravenous contrast.  Sagittal and coronal reformatted images were  created and reviewed.  This CT exam was performed using one or more of  the  following dose reduction techniques:  automated exposure control,  adjustment of the mA and/or kV according to patient size, and/or use of  iterative reconstruction technique.    COMPARISON:  12/07/2020    FINDINGS:  LUNG BASES:  Unremarkable.  No mass.  No consolidation.    ABDOMEN:  LIVER:  Unremarkable.  GALLBLADDER AND BILE DUCTS:  Unremarkable.  No calcified stones.  No  ductal dilation.  PANCREAS:  Unremarkable.  No ductal dilation.  SPLEEN:  Unremarkable.  No splenomegaly.  ADRENALS:  Unremarkable.  No mass.  KIDNEYS AND URETERS:  Right proximal ureteral calculus measuring about  1.3 cm causes moderate right hydronephrosis.  STOMACH AND BOWEL:  Unremarkable.  No obstruction.  No mucosal  thickening.    PELVIS:  APPENDIX:  No findings to suggest acute appendicitis.  BLADDER:  Unremarkable.  No stones.  REPRODUCTIVE:  Unremarkable as visualized.    ABDOMEN and PELVIS:  INTRAPERITONEAL SPACE:  Unremarkable.  No free air.  No significant  fluid collection.  BONES/JOINTS:  No acute fracture.  No dislocation.  SOFT TISSUES:  Unremarkable.  VASCULATURE:  Unremarkable.  No abdominal aortic aneurysm.  LYMPH NODES:  Unremarkable.  No enlarged lymph nodes.    Impression  Right proximal ureteral calculus measuring about 1.3 cm causes  moderate right hydronephrosis.    This report was finalized on 7/19/2021 12:55 PM by Dr. Mario Beavers MD.     KUB: Results for orders placed in visit on 06/14/23    XR Abdomen KUB    Narrative  EXAM:  XR Abdomen, 1 View    EXAM DATE:  6/14/2023 11:12 AM    CLINICAL HISTORY:  flank pain; N20.0-Calculus of kidney    TECHNIQUE:  Frontal supine view of the abdomen/pelvis.    COMPARISON:  No relevant prior studies available.    FINDINGS:  Gastrointestinal tract:  Unremarkable.  No dilation.  Organs:  Bilateral calcifications which project in the region of the  kidneys.  Bones/joints:  Unremarkable.    Impression  Bilateral kidney stones.    This report was finalized on 6/14/2023 11:27 AM by   Magdy Daley MD.       Labs:  Brief Urine Lab Results  (Last result in the past 365 days)        Color   Clarity   Blood   Leuk Est   Nitrite   Protein   CREAT   Urine HCG        06/14/23 1128 Yellow   Cloudy   3+   Negative   Negative   1+                 Office Visit on 07/28/2023   Component Date Value Ref Range Status    Glucose 07/28/2023 137 (H)  65 - 99 mg/dL Final    BUN 07/28/2023 14  8 - 23 mg/dL Final    Creatinine 07/28/2023 1.29 (H)  0.76 - 1.27 mg/dL Final    Sodium 07/28/2023 139  136 - 145 mmol/L Final    Potassium 07/28/2023 5.7 (H)  3.5 - 5.2 mmol/L Final    Chloride 07/28/2023 104  98 - 107 mmol/L Final    CO2 07/28/2023 22.0  22.0 - 29.0 mmol/L Final    Calcium 07/28/2023 9.8  8.6 - 10.5 mg/dL Final    Total Protein 07/28/2023 7.1  6.0 - 8.5 g/dL Final    Albumin 07/28/2023 4.5  3.5 - 5.2 g/dL Final    ALT (SGPT) 07/28/2023 12  1 - 41 U/L Final    AST (SGOT) 07/28/2023 18  1 - 40 U/L Final    Alkaline Phosphatase 07/28/2023 82  39 - 117 U/L Final    Total Bilirubin 07/28/2023 0.4  0.0 - 1.2 mg/dL Final    Globulin 07/28/2023 2.6  gm/dL Final    A/G Ratio 07/28/2023 1.7  g/dL Final    BUN/Creatinine Ratio 07/28/2023 10.9  7.0 - 25.0 Final    Anion Gap 07/28/2023 13.0  5.0 - 15.0 mmol/L Final    eGFR 07/28/2023 59.3 (L)  >60.0 mL/min/1.73 Final   Admission on 07/07/2023, Discharged on 07/07/2023   Component Date Value Ref Range Status    Glucose 07/07/2023 112  70 - 130 mg/dL Final    Meter: [id number] : [id number] lavern farrar   Pre-Admission Testing on 07/05/2023   Component Date Value Ref Range Status    WBC 07/05/2023 8.74  3.40 - 10.80 10*3/mm3 Final    RBC 07/05/2023 5.91 (H)  4.14 - 5.80 10*6/mm3 Final    Hemoglobin 07/05/2023 15.7  13.0 - 17.7 g/dL Final    Hematocrit 07/05/2023 48.5  37.5 - 51.0 % Final    MCV 07/05/2023 82.1  79.0 - 97.0 fL Final    MCH 07/05/2023 26.6  26.6 - 33.0 pg Final    MCHC 07/05/2023 32.4  31.5 - 35.7 g/dL Final    RDW 07/05/2023 14.9  12.3 - 15.4 %  Final    RDW-SD 07/05/2023 45.0  37.0 - 54.0 fl Final    MPV 07/05/2023 11.3  6.0 - 12.0 fL Final    Platelets 07/05/2023 259  140 - 450 10*3/mm3 Final    Glucose 07/05/2023 65  65 - 99 mg/dL Final    BUN 07/05/2023 17  8 - 23 mg/dL Final    Creatinine 07/05/2023 1.54 (H)  0.76 - 1.27 mg/dL Final    Sodium 07/05/2023 135 (L)  136 - 145 mmol/L Final    Potassium 07/05/2023 4.7  3.5 - 5.2 mmol/L Final    Slight hemolysis detected by analyzer. Results may be affected.    Chloride 07/05/2023 104  98 - 107 mmol/L Final    CO2 07/05/2023 21.5 (L)  22.0 - 29.0 mmol/L Final    Calcium 07/05/2023 9.2  8.6 - 10.5 mg/dL Final    BUN/Creatinine Ratio 07/05/2023 11.0  7.0 - 25.0 Final    Anion Gap 07/05/2023 9.5  5.0 - 15.0 mmol/L Final    eGFR 07/05/2023 47.9 (L)  >60.0 mL/min/1.73 Final    QT Interval 07/05/2023 402  ms Final    QTC Interval 07/05/2023 402  ms Final   Office Visit on 06/14/2023   Component Date Value Ref Range Status    Color 06/14/2023 Yellow  Yellow, Straw, Dark Yellow, Maggy Final    Clarity, UA 06/14/2023 Cloudy (A)  Clear Final    Specific Gravity  06/14/2023 1.030  1.005 - 1.030 Final    pH, Urine 06/14/2023 5.5  5.0 - 8.0 Final    Leukocytes 06/14/2023 Negative  Negative Final    Nitrite, UA 06/14/2023 Negative  Negative Final    Protein, POC 06/14/2023 1+ (A)  Negative mg/dL Final    Glucose, UA 06/14/2023 Negative  Negative mg/dL Final    Ketones, UA 06/14/2023 1+ (A)  Negative Final    Urobilinogen, UA 06/14/2023 Normal  Normal, 0.2 E.U./dL Final    Bilirubin 06/14/2023 1 mg/dL (A)  Negative Final    Blood, UA 06/14/2023 3+ (A)  Negative Final    Lot Number 06/14/2023 n   Final    Expiration Date 06/14/2023 n   Final    Urine Culture 06/14/2023 No growth   Final        Procedure: None  Procedures    I have reviewed and agree with the above PMH, PSH, FMH, social history, medications, allergies, and labs.      Assessment/Plan:   Problem List Items Addressed This Visit          Genitourinary and  Reproductive     Kidney stone - Primary    Overview     Added automatically from request for surgery [id number]         Relevant Orders    Comprehensive Metabolic Panel (Completed)     Other Visit Diagnoses       Screening PSA (prostate specific antigen)                Health Maintenance:   Health Maintenance Due   Topic Date Due    COLORECTAL CANCER SCREENING  Never done    Pneumococcal Vaccine 65+ (1 - PCV) Never done    HEPATITIS C SCREENING  Never done    ANNUAL WELLNESS VISIT  Never done    LIPID PANEL  05/03/2019    COVID-19 Vaccine (6 - Moderna series) 03/22/2023        Smoking Counseling: Former smoker.  Never used smokeless tobacco.  Counseling done.    Urine Incontinence: Patient reports that he is not currently experiencing any symptoms of urinary incontinence.    Patient was given instructions and counseling regarding his condition or for health maintenance advice. Please see specific information pulled into the AVS if appropriate.    Patient Education:   Kidney stone -patient is roughly 3 weeks postop of a right extracorporal shockwave lithotripsy signs of decreased stone burden.  He does have a small right intrarenal fragmentation.  I believe the patient will pass this in time.  I also advised him of his large roughly 14 to 15 mm left intrarenal calculi.  Given the patient is not having any symptoms at this time recommending observation.  Did discuss the use of a left ESWL if he begins to get any symptoms of back/flank pain, gross hematuria, microscopic hematuria, increase in lower urinary tract symptoms, or nausea/vomiting.  I will repeat a CMP in office today to evaluate kidney function.  I will call with results if abnormal or worsened.  Patient verbalized understanding.    Visit Diagnoses:    ICD-10-CM ICD-9-CM   1. Kidney stone  N20.0 592.0   2. Screening PSA (prostate specific antigen)  Z12.5 V76.44       Meds Ordered During Visit:  No orders of the defined types were placed in this  encounter.      Follow Up Appointment: 3 months  No follow-ups on file.      This document has been electronically signed by Dominick Porter PA-C   July 30, 2023 18:34 EDT    Part of this note may be an electronic transcription/translation of spoken language to printed text using the Dragon Dictation System.

## 2023-07-29 LAB
ALBUMIN SERPL-MCNC: 4.5 G/DL (ref 3.5–5.2)
ALBUMIN/GLOB SERPL: 1.7 G/DL
ALP SERPL-CCNC: 82 U/L (ref 39–117)
ALT SERPL W P-5'-P-CCNC: 12 U/L (ref 1–41)
ANION GAP SERPL CALCULATED.3IONS-SCNC: 13 MMOL/L (ref 5–15)
AST SERPL-CCNC: 18 U/L (ref 1–40)
BILIRUB SERPL-MCNC: 0.4 MG/DL (ref 0–1.2)
BUN SERPL-MCNC: 14 MG/DL (ref 8–23)
BUN/CREAT SERPL: 10.9 (ref 7–25)
CALCIUM SPEC-SCNC: 9.8 MG/DL (ref 8.6–10.5)
CHLORIDE SERPL-SCNC: 104 MMOL/L (ref 98–107)
CO2 SERPL-SCNC: 22 MMOL/L (ref 22–29)
CREAT SERPL-MCNC: 1.29 MG/DL (ref 0.76–1.27)
EGFRCR SERPLBLD CKD-EPI 2021: 59.3 ML/MIN/1.73
GLOBULIN UR ELPH-MCNC: 2.6 GM/DL
GLUCOSE SERPL-MCNC: 137 MG/DL (ref 65–99)
POTASSIUM SERPL-SCNC: 5.7 MMOL/L (ref 3.5–5.2)
PROT SERPL-MCNC: 7.1 G/DL (ref 6–8.5)
SODIUM SERPL-SCNC: 139 MMOL/L (ref 136–145)

## 2023-08-01 ENCOUNTER — TELEPHONE (OUTPATIENT)
Dept: UROLOGY | Facility: CLINIC | Age: 73
End: 2023-08-01
Payer: MEDICARE

## 2023-08-12 ENCOUNTER — APPOINTMENT (OUTPATIENT)
Dept: CT IMAGING | Facility: HOSPITAL | Age: 73
End: 2023-08-12
Payer: MEDICARE

## 2023-08-12 ENCOUNTER — HOSPITAL ENCOUNTER (EMERGENCY)
Facility: HOSPITAL | Age: 73
Discharge: HOME | End: 2023-08-12
Attending: FAMILY MEDICINE
Payer: MEDICARE

## 2023-08-12 VITALS
BODY MASS INDEX: 29.62 KG/M2 | HEART RATE: 63 BPM | WEIGHT: 200 LBS | DIASTOLIC BLOOD PRESSURE: 90 MMHG | OXYGEN SATURATION: 97 % | TEMPERATURE: 98.1 F | HEIGHT: 69 IN | SYSTOLIC BLOOD PRESSURE: 175 MMHG | RESPIRATION RATE: 16 BRPM

## 2023-08-12 DIAGNOSIS — R33.8 ACUTE URINARY RETENTION: Primary | ICD-10-CM

## 2023-08-12 LAB
ALBUMIN SERPL-MCNC: 4.2 G/DL (ref 3.5–5.2)
ALBUMIN/GLOB SERPL: 1.5 G/DL
ALP SERPL-CCNC: 88 U/L (ref 39–117)
ALT SERPL W P-5'-P-CCNC: 10 U/L (ref 1–41)
ANION GAP SERPL CALCULATED.3IONS-SCNC: 10.8 MMOL/L (ref 5–15)
AST SERPL-CCNC: 18 U/L (ref 1–40)
BACTERIA UR QL AUTO: ABNORMAL /HPF
BASOPHILS # BLD AUTO: 0.07 10*3/MM3 (ref 0–0.2)
BASOPHILS NFR BLD AUTO: 0.7 % (ref 0–1.5)
BILIRUB SERPL-MCNC: 0.3 MG/DL (ref 0–1.2)
BILIRUB UR QL STRIP: NEGATIVE
BUN SERPL-MCNC: 18 MG/DL (ref 8–23)
BUN/CREAT SERPL: 11.1 (ref 7–25)
CALCIUM SPEC-SCNC: 9.3 MG/DL (ref 8.6–10.5)
CHLORIDE SERPL-SCNC: 103 MMOL/L (ref 98–107)
CLARITY UR: CLEAR
CO2 SERPL-SCNC: 25.2 MMOL/L (ref 22–29)
COLOR UR: YELLOW
CREAT SERPL-MCNC: 1.62 MG/DL (ref 0.76–1.27)
DEPRECATED RDW RBC AUTO: 47.7 FL (ref 37–54)
EGFRCR SERPLBLD CKD-EPI 2021: 45.1 ML/MIN/1.73
EOSINOPHIL # BLD AUTO: 0.23 10*3/MM3 (ref 0–0.4)
EOSINOPHIL NFR BLD AUTO: 2.4 % (ref 0.3–6.2)
ERYTHROCYTE [DISTWIDTH] IN BLOOD BY AUTOMATED COUNT: 16.1 % (ref 12.3–15.4)
GLOBULIN UR ELPH-MCNC: 2.8 GM/DL
GLUCOSE SERPL-MCNC: 80 MG/DL (ref 65–99)
GLUCOSE UR STRIP-MCNC: NEGATIVE MG/DL
HCT VFR BLD AUTO: 48.3 % (ref 37.5–51)
HGB BLD-MCNC: 15.7 G/DL (ref 13–17.7)
HGB UR QL STRIP.AUTO: ABNORMAL
HOLD SPECIMEN: NORMAL
HOLD SPECIMEN: NORMAL
HYALINE CASTS UR QL AUTO: ABNORMAL /LPF
IMM GRANULOCYTES # BLD AUTO: 0.03 10*3/MM3 (ref 0–0.05)
IMM GRANULOCYTES NFR BLD AUTO: 0.3 % (ref 0–0.5)
KETONES UR QL STRIP: ABNORMAL
LEUKOCYTE ESTERASE UR QL STRIP.AUTO: ABNORMAL
LYMPHOCYTES # BLD AUTO: 1.76 10*3/MM3 (ref 0.7–3.1)
LYMPHOCYTES NFR BLD AUTO: 18 % (ref 19.6–45.3)
MCH RBC QN AUTO: 26.8 PG (ref 26.6–33)
MCHC RBC AUTO-ENTMCNC: 32.5 G/DL (ref 31.5–35.7)
MCV RBC AUTO: 82.6 FL (ref 79–97)
MONOCYTES # BLD AUTO: 0.52 10*3/MM3 (ref 0.1–0.9)
MONOCYTES NFR BLD AUTO: 5.3 % (ref 5–12)
NEUTROPHILS NFR BLD AUTO: 7.17 10*3/MM3 (ref 1.7–7)
NEUTROPHILS NFR BLD AUTO: 73.3 % (ref 42.7–76)
NITRITE UR QL STRIP: NEGATIVE
NRBC BLD AUTO-RTO: 0 /100 WBC (ref 0–0.2)
PH UR STRIP.AUTO: 6 [PH] (ref 5–8)
PLATELET # BLD AUTO: 232 10*3/MM3 (ref 140–450)
PMV BLD AUTO: 11 FL (ref 6–12)
POTASSIUM SERPL-SCNC: 4.2 MMOL/L (ref 3.5–5.2)
PROT SERPL-MCNC: 7 G/DL (ref 6–8.5)
PROT UR QL STRIP: ABNORMAL
RBC # BLD AUTO: 5.85 10*6/MM3 (ref 4.14–5.8)
RBC # UR STRIP: ABNORMAL /HPF
REF LAB TEST METHOD: ABNORMAL
SODIUM SERPL-SCNC: 139 MMOL/L (ref 136–145)
SP GR UR STRIP: 1.01 (ref 1–1.03)
SQUAMOUS #/AREA URNS HPF: ABNORMAL /HPF
UROBILINOGEN UR QL STRIP: ABNORMAL
WBC # UR STRIP: ABNORMAL /HPF
WBC NRBC COR # BLD: 9.78 10*3/MM3 (ref 3.4–10.8)
WHOLE BLOOD HOLD COAG: NORMAL
WHOLE BLOOD HOLD SPECIMEN: NORMAL

## 2023-08-12 PROCEDURE — 80053 COMPREHEN METABOLIC PANEL: CPT | Performed by: PHYSICIAN ASSISTANT

## 2023-08-12 PROCEDURE — 81001 URINALYSIS AUTO W/SCOPE: CPT | Performed by: PHYSICIAN ASSISTANT

## 2023-08-12 PROCEDURE — 74176 CT ABD & PELVIS W/O CONTRAST: CPT

## 2023-08-12 PROCEDURE — 51702 INSERT TEMP BLADDER CATH: CPT

## 2023-08-12 PROCEDURE — 36415 COLL VENOUS BLD VENIPUNCTURE: CPT

## 2023-08-12 PROCEDURE — 74176 CT ABD & PELVIS W/O CONTRAST: CPT | Performed by: RADIOLOGY

## 2023-08-12 PROCEDURE — 51798 US URINE CAPACITY MEASURE: CPT

## 2023-08-12 PROCEDURE — 85025 COMPLETE CBC W/AUTO DIFF WBC: CPT | Performed by: PHYSICIAN ASSISTANT

## 2023-08-12 PROCEDURE — 99284 EMERGENCY DEPT VISIT MOD MDM: CPT

## 2023-08-12 NOTE — ED NOTES
Patient unable to urinate. Patient states the last time he urinated was around 8 or 9 this morning.

## 2023-08-12 NOTE — ED PROVIDER NOTES
Subjective   History of Present Illness  Patient is a 73-year-old male presents emerged today complaint of dysuria.  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  Patient reports he does not use a Mortensen anymore or self cath but is able to urinate most of the time.  Patient recently had a procedure regularly and is now states he feels like he is passing some.  Patient denies pain currently.  Patient reports he is unable to urinate since early this morning.  Patient denies any leaving worsening factors.      Review of Systems   Constitutional: Negative.    HENT: Negative.     Eyes: Negative.    Respiratory: Negative.     Cardiovascular: Negative.    Gastrointestinal: Negative.    Endocrine: Negative.    Genitourinary:  Positive for difficulty urinating.   Musculoskeletal: Negative.    Skin: Negative.    Allergic/Immunologic: Negative.    Neurological: Negative.    Hematological: Negative.    Psychiatric/Behavioral: Negative.       Past Medical History:   Diagnosis Date    Appendicitis     Arthritis     COPD (chronic obstructive pulmonary disease)     Coronary artery disease     COVID-19 2021    Diabetes mellitus     Disease of thyroid gland     Diverticulitis     Elevated cholesterol     Emphysema lung     Emphysema lung     Emphysema lung     GERD (gastroesophageal reflux disease)     Hiatal hernia     Hyperlipidemia     Hypertension     Kidney stone     MI (myocardial infarction)     PONV (postoperative nausea and vomiting)     Stomach ulcer     Stroke 2018       Allergies   Allergen Reactions    Claritin [Loratadine] Other (See Comments)     Possible kidney issues    Codeine Other (See Comments)     Kidney issues        Past Surgical History:   Procedure Laterality Date    APPENDECTOMY      CARDIAC CATHETERIZATION      COLONOSCOPY      CORONARY STENT PLACEMENT      ENDOSCOPY      EXTRACORPOREAL SHOCK WAVE LITHOTRIPSY (ESWL) Right  7/23/2021    Procedure: EXTRACORPOREAL SHOCKWAVE LITHOTRIPSY;  Surgeon: Nabeel Melendrez MD;  Location:  [facility];  Service: Urology;  Laterality: Right;    EXTRACORPOREAL SHOCK WAVE LITHOTRIPSY (ESWL) Right 7/7/2023    Procedure: EXTRACORPOREAL SHOCKWAVE LITHOTRIPSY;  Surgeon: Nabeel Melendrez MD;  Location: [facility] OR;  Service: Urology;  Laterality: Right;    PAIN PUMP TRIAL      TURP / TRANSURETHRAL INCISION / DRAINAGE PROSTATE         Family History   Problem Relation Age of Onset    Cancer Mother     Heart disease Father     Stroke Father        Social History     Socioeconomic History    Marital status:    Tobacco Use    Smoking status: Former     Years: 20.00     Types: Cigarettes    Smokeless tobacco: Never    Tobacco comments:     quit 20+ yrs ago    Vaping Use    Vaping Use: Never used   Substance and Sexual Activity    Alcohol use: No     Comment: former use 20 + yrs ago     Drug use: No    Sexual activity: Defer           Objective   Physical Exam  Vitals and nursing note reviewed.   Constitutional:       Appearance: He is well-developed.   HENT:      Head: Normocephalic.      Right Ear: External ear normal.      Left Ear: External ear normal.   Eyes:      Conjunctiva/sclera: Conjunctivae normal.      Pupils: Pupils are equal, round, and reactive to light.   Cardiovascular:      Rate and Rhythm: Normal rate and regular rhythm.      Heart sounds: Normal heart sounds.   Pulmonary:      Effort: Pulmonary effort is normal.      Breath sounds: Normal breath sounds.   Abdominal:      General: Bowel sounds are normal.      Palpations: Abdomen is soft.   Musculoskeletal:         General: Normal range of motion.      Cervical back: Normal range of motion and neck supple.   Skin:     General: Skin is warm and dry.      Capillary Refill: Capillary refill takes less than 2 seconds.   Neurological:      Mental Status: He is alert and oriented to person, place, and time.   Psychiatric:          Behavior: Behavior normal.         Thought Content: Thought content normal.       Procedures           ED Course                                           Medical Decision Making  Amount and/or Complexity of Data Reviewed  Labs: ordered.  Radiology: ordered.        Final diagnoses:   Acute urinary retention       ED Disposition  ED Disposition       ED Disposition   Discharge    Condition   Stable    Comment   --               Nabeel Melendrez MD  [address] [place] [address] [address]  [facility] [address]  [phone]    Schedule an appointment as soon as possible for a visit   For further evaluation         Medication List      No changes were made to your prescriptions during this visit.            Manjit Finney P, APRN  08/12/23 1825

## 2023-08-12 NOTE — ED NOTES
MEDICAL SCREENING:    Reason for Visit: hx of kidney stones, R flank pain radiating into groin, decreased urination.     Patient initially seen in triage.  The patient was advised further evaluation and diagnostic testing will be needed, some of the treatment and testing will be initiated in the lobby in order to begin the process.  The patient will be returned to the waiting area for the time being and possibly be re-assessed by a subsequent ED provider.  The patient will be brought back to the treatment area in as timely manner as possible.       Laura Encarnacion PA  08/12/23 4728

## 2023-08-14 ENCOUNTER — OFFICE VISIT (OUTPATIENT)
Dept: UROLOGY | Facility: CLINIC | Age: 73
End: 2023-08-14
Payer: MEDICARE

## 2023-08-14 VITALS
BODY MASS INDEX: 30.48 KG/M2 | SYSTOLIC BLOOD PRESSURE: 155 MMHG | HEART RATE: 62 BPM | WEIGHT: 205.8 LBS | HEIGHT: 69 IN | DIASTOLIC BLOOD PRESSURE: 87 MMHG

## 2023-08-14 DIAGNOSIS — N20.1 RIGHT URETERAL STONE: Primary | ICD-10-CM

## 2023-08-14 DIAGNOSIS — R33.8 ACUTE URINARY RETENTION: ICD-10-CM

## 2023-08-14 PROCEDURE — 99024 POSTOP FOLLOW-UP VISIT: CPT

## 2023-08-14 PROCEDURE — 3079F DIAST BP 80-89 MM HG: CPT

## 2023-08-14 PROCEDURE — 1159F MED LIST DOCD IN RCRD: CPT

## 2023-08-14 PROCEDURE — 1160F RVW MEDS BY RX/DR IN RCRD: CPT

## 2023-08-14 PROCEDURE — 3077F SYST BP >= 140 MM HG: CPT

## 2023-08-14 RX ORDER — GENTAMICIN SULFATE 80 MG/100ML
80 INJECTION, SOLUTION INTRAVENOUS ONCE
OUTPATIENT
Start: 2023-08-14 | End: 2023-08-14

## 2023-08-14 NOTE — H&P (VIEW-ONLY)
"Chief Complaint:    Chief Complaint   Patient presents with    Urinary Retention     ER follow up       Vital Signs:   /87   Pulse 62   Ht 175.3 cm (69.02\")   Wt 93.4 kg (205 lb 12.8 oz)   BMI 30.38 kg/mý   Body mass index is 30.38 kg/mý.      HPI:  Ye Butler is a 73 y.o. male who presents today for hospital follow up    History of Present Illness  Mr. Butler presents to the clinic for an ER follow-up.  Patient has a past medical history significant for nephrolithiasis.  She has underwent a right extracortical shockwave lithotripsy by Dr. Melendrez roughly 2 months ago.  He was recently seen in office on 7/27/2023 and underwent KUB that showed a left nonobstructing intrarenal stone with no other stones noted.  Presented to the ER on 8/12/2023 for right-sided back and flank pain as well as difficulty urinating.  He had a CT scan of the abdomen pelvis completed that showed a distal 6 mm right UVJ calculus causing moderate obstructive uropathy.  He is well-known left ureteral calculus was unchanged.  There were also small 1 to 2 mm ureteral calculus proximal to this large 6 mm right UVJ stone.  Patient was intermittently cath that ER visit however could not urinate afterwards so a Mortensen catheter was anchored.  States he has had some slight blood clots since but has not identified any passage of the stone.  States that pain is well controlled at this time.  He is desirous to undergo surgery for stone removal.  We will schedule him for a right uteroscopy with laser lithotripsy on 8/23/2023.  Urinary Retention  Associated symptoms include fatigue. Pertinent negatives include no abdominal pain, chest pain, chills, congestion, fever, headaches, nausea, neck pain, rash or vomiting.     Past Medical History:  Past Medical History:   Diagnosis Date    Appendicitis     Arthritis     COPD (chronic obstructive pulmonary disease)     Coronary artery disease     COVID-19 2021    Diabetes mellitus     Disease of " thyroid gland     Diverticulitis     Dizzy     Elevated cholesterol     Emphysema lung     Emphysema lung     Emphysema lung     GERD (gastroesophageal reflux disease)     Hiatal hernia     Hyperlipidemia     Hypertension     Kidney stone     MI (myocardial infarction)     PONV (postoperative nausea and vomiting)     Stomach ulcer     Stroke 2018       Current Meds:  Current Outpatient Medications   Medication Sig Dispense Refill    aspirin 81 MG tablet Take 1 tablet by mouth Daily. 30 tablet 11    Docusate Sodium 100 MG capsule Take 100 mg by mouth As Needed for Constipation.      glimepiride (AMARYL) 4 MG tablet Take 1 tablet by mouth Daily.      HYDROcodone-acetaminophen (NORCO)  MG per tablet 10 tablets.      ketorolac (TORADOL) 10 MG tablet Take 1 tablet by mouth Every 6 (Six) Hours As Needed for Moderate Pain. 16 tablet 2    levothyroxine (SYNTHROID, LEVOTHROID) 50 MCG tablet Take 1 tablet by mouth Daily.  0    Linzess 145 MCG capsule capsule       lisinopril (PRINIVIL,ZESTRIL) 20 MG tablet Take 1 tablet by mouth 2 (Two) Times a Day. 60 tablet 5    memantine (NAMENDA) 5 MG tablet Take 1 tablet by mouth Daily.      metFORMIN (GLUCOPHAGE) 500 MG tablet Take 1 tablet by mouth 2 (Two) Times a Day With Meals.  0    metoprolol succinate XL (TOPROL-XL) 25 MG 24 hr tablet Take 1 tablet every day by oral route.      montelukast (SINGULAIR) 10 MG tablet Take 1 tablet by mouth Every Night.      nitroglycerin (NITROSTAT) 0.4 MG SL tablet 1 under the tongue as needed for angina, may repeat q5mins for up three doses 100 tablet 11    NON FORMULARY Morphine pump      Omeprazole Magnesium (PRILOSEC PO) 40 mg 1xday      ondansetron ODT (ZOFRAN-ODT) 4 MG disintegrating tablet Place 1 tablet on the tongue Every 6 (Six) Hours As Needed for Nausea or Vomiting. 10 tablet 0    sertraline (ZOLOFT) 25 MG tablet Take 1 tablet by mouth Daily.      simvastatin (ZOCOR) 40 MG tablet Take 1 tablet every day by oral route.       tamsulosin (FLOMAX) 0.4 MG capsule 24 hr capsule 1 capsule Daily.      Testosterone Cypionate (DEPOTESTOTERONE CYPIONATE) 200 MG/ML injection Inject 1 mL into the appropriate muscle as directed by prescriber Every 21 (Twenty-One) Days.      traZODone (DESYREL) 50 MG tablet       Zolpidem Tartrate 5 MG/ACT solution Take  by mouth.      amLODIPine (NORVASC) 5 MG tablet Take 1 tablet by mouth Daily.       No current facility-administered medications for this visit.        Allergies:   Allergies   Allergen Reactions    Claritin [Loratadine] Other (See Comments)     Possible kidney issues    Codeine Other (See Comments)     Kidney issues         Past Surgical History:  Past Surgical History:   Procedure Laterality Date    APPENDECTOMY      CARDIAC CATHETERIZATION      COLONOSCOPY      CORONARY STENT PLACEMENT      ENDOSCOPY      EXTRACORPOREAL SHOCK WAVE LITHOTRIPSY (ESWL) Right 7/23/2021    Procedure: EXTRACORPOREAL SHOCKWAVE LITHOTRIPSY;  Surgeon: Nabeel Melendrez MD;  Location: [facility];  Service: Urology;  Laterality: Right;    EXTRACORPOREAL SHOCK WAVE LITHOTRIPSY (ESWL) Right 7/7/2023    Procedure: EXTRACORPOREAL SHOCKWAVE LITHOTRIPSY;  Surgeon: Nabeel Melendrez MD;  Location: [facility];  Service: Urology;  Laterality: Right;    PAIN PUMP TRIAL      TURP / TRANSURETHRAL INCISION / DRAINAGE PROSTATE         Social History:  Social History     Socioeconomic History    Marital status:    Tobacco Use    Smoking status: Former     Years: 20.00     Types: Cigarettes    Smokeless tobacco: Never    Tobacco comments:     quit 20+ yrs ago    Vaping Use    Vaping Use: Never used   Substance and Sexual Activity    Alcohol use: No     Comment: former use 20 + yrs ago     Drug use: No    Sexual activity: Defer       Family History:  Family History   Problem Relation Age of Onset    Cancer Mother     Heart disease Father     Stroke Father        Review of Systems:  Review of Systems   Constitutional:   Positive for fatigue. Negative for chills, fever and unexpected weight change.   HENT:  Negative for congestion and sinus pressure.    Respiratory:  Negative for chest tightness and shortness of breath.    Cardiovascular:  Negative for chest pain.   Gastrointestinal:  Negative for abdominal pain, constipation, diarrhea, nausea and vomiting.   Genitourinary:  Negative for difficulty urinating, dysuria, flank pain, frequency, hematuria and urgency.   Musculoskeletal:  Negative for back pain and neck pain.   Skin:  Negative for rash.   Neurological:  Negative for dizziness and headaches.   Hematological:  Bruises/bleeds easily.   Psychiatric/Behavioral:  Negative for confusion and suicidal ideas. The patient is not nervous/anxious.      Physical Exam:  Physical Exam  Constitutional:       General: He is not in acute distress.     Appearance: Normal appearance.   HENT:      Head: Normocephalic and atraumatic.      Nose: Nose normal.      Mouth/Throat:      Mouth: Mucous membranes are moist.   Eyes:      Conjunctiva/sclera: Conjunctivae normal.   Cardiovascular:      Rate and Rhythm: Normal rate and regular rhythm.      Pulses: Normal pulses.      Heart sounds: Normal heart sounds.   Pulmonary:      Effort: Pulmonary effort is normal.      Breath sounds: Normal breath sounds.   Abdominal:      General: Bowel sounds are normal.      Palpations: Abdomen is soft.      Tenderness: There is no right CVA tenderness or left CVA tenderness.   Musculoskeletal:         General: Normal range of motion.      Cervical back: Normal range of motion.   Skin:     General: Skin is warm.   Neurological:      General: No focal deficit present.      Mental Status: He is alert and oriented to person, place, and time.   Psychiatric:         Mood and Affect: Mood normal.         Behavior: Behavior normal.         Thought Content: Thought content normal.         Judgment: Judgment normal.       Recent Image (CT and/or KUB):   CT Abdomen and  Pelvis: No results found for this or any previous visit.     CT Stone Protocol: Results for orders placed during the hospital encounter of 08/12/23    CT Abdomen Pelvis Stone Protocol    Narrative  Comparison: April 19, 2023.    Lung bases are clear. Slight nodular contour the liver, question  underlying cirrhosis. The gallbladder, pancreas, spleen and adrenal  glands are unremarkable. Nonobstructive left renal stones with the  dominant stone measuring measures 1.2 cm. Stones distal right ureter  with the dominant stone at the distal right ureter measures 6 mm. More  proximal stones measuring 1-2 mm, findings causing moderate hydroureter  and hydronephrosis with perinephric stranding. Mild right renal scarring  with renal atrophy. No obstructive uropathy seen on the left. Small  fat-containing left inguinal hernia. Diverticulosis is noted without  diverticulitis. No bowel obstruction, free air or significant free fluid  seen. The appendix is not seen. Multilevel degenerative changes  thoracolumbar spine.    Impression  Impression:  1. Distal right ureter stone causing moderate obstructive uropathy.    This report was finalized on 8/12/2023 4:14 PM by Phil Lazar DO.     KUB: Results for orders placed during the hospital encounter of 07/28/23    XR Abdomen KUB    Narrative  EXAM:  XR Abdomen, 1 View    EXAM DATE:  7/28/2023 10:53 AM    CLINICAL HISTORY:  flank pain; N20.0-Calculus of kidney    TECHNIQUE:  Frontal supine view of the abdomen/pelvis.    COMPARISON:  06/14/2023    FINDINGS:  Gastrointestinal tract:  Unremarkable.  No dilation.  Organs:  Small nonobstructing right kidney stone.  14.6 mm stone in  the region of the lower pole left kidney.  Bones/joints:  Unremarkable.  Soft tissues:  Surgical clips lower pelvis.  Other findings:  Prostate calcifications noted.    Impression  1.  Small nonobstructing right kidney stone.  2.  14.6 mm stone in the region of the lower pole left kidney.    This report was  finalized on 7/28/2023 11:04 AM by Dr. Magdy Daley MD.       Labs:  Brief Urine Lab Results  (Last result in the past 365 days)        Color   Clarity   Blood   Leuk Est   Nitrite   Protein   CREAT   Urine HCG        08/12/23 1630 Yellow   Clear   Moderate (2+)   Small (1+)   Negative   Trace                 Admission on 08/12/2023, Discharged on 08/12/2023   Component Date Value Ref Range Status    Glucose 08/12/2023 80  65 - 99 mg/dL Final    BUN 08/12/2023 18  8 - 23 mg/dL Final    Creatinine 08/12/2023 1.62 (H)  0.76 - 1.27 mg/dL Final    Sodium 08/12/2023 139  136 - 145 mmol/L Final    Potassium 08/12/2023 4.2  3.5 - 5.2 mmol/L Final    Slight hemolysis detected by analyzer. Results may be affected.    Chloride 08/12/2023 103  98 - 107 mmol/L Final    CO2 08/12/2023 25.2  22.0 - 29.0 mmol/L Final    Calcium 08/12/2023 9.3  8.6 - 10.5 mg/dL Final    Total Protein 08/12/2023 7.0  6.0 - 8.5 g/dL Final    Albumin 08/12/2023 4.2  3.5 - 5.2 g/dL Final    ALT (SGPT) 08/12/2023 10  1 - 41 U/L Final    AST (SGOT) 08/12/2023 18  1 - 40 U/L Final    Alkaline Phosphatase 08/12/2023 88  39 - 117 U/L Final    Total Bilirubin 08/12/2023 0.3  0.0 - 1.2 mg/dL Final    Globulin 08/12/2023 2.8  gm/dL Final    A/G Ratio 08/12/2023 1.5  g/dL Final    BUN/Creatinine Ratio 08/12/2023 11.1  7.0 - 25.0 Final    Anion Gap 08/12/2023 10.8  5.0 - 15.0 mmol/L Final    eGFR 08/12/2023 45.1 (L)  >60.0 mL/min/1.73 Final    Color, UA 08/12/2023 Yellow  Yellow, Straw Final    Appearance, UA 08/12/2023 Clear  Clear Final    pH, UA 08/12/2023 6.0  5.0 - 8.0 Final    Specific Gravity, UA 08/12/2023 1.015  1.005 - 1.030 Final    Glucose, UA 08/12/2023 Negative  Negative Final    Ketones, UA 08/12/2023 Trace (A)  Negative Final    Bilirubin, UA 08/12/2023 Negative  Negative Final    Blood, UA 08/12/2023 Moderate (2+) (A)  Negative Final    Protein, UA 08/12/2023 Trace (A)  Negative Final    Leuk Esterase, UA 08/12/2023 Small (1+) (A)  Negative  Final    Nitrite, UA 08/12/2023 Negative  Negative Final    Urobilinogen, UA 08/12/2023 0.2 E.U./dL  0.2 - 1.0 E.U./dL Final    WBC 08/12/2023 9.78  3.40 - 10.80 10*3/mm3 Final    RBC 08/12/2023 5.85 (H)  4.14 - 5.80 10*6/mm3 Final    Hemoglobin 08/12/2023 15.7  13.0 - 17.7 g/dL Final    Hematocrit 08/12/2023 48.3  37.5 - 51.0 % Final    MCV 08/12/2023 82.6  79.0 - 97.0 fL Final    MCH 08/12/2023 26.8  26.6 - 33.0 pg Final    MCHC 08/12/2023 32.5  31.5 - 35.7 g/dL Final    RDW 08/12/2023 16.1 (H)  12.3 - 15.4 % Final    RDW-SD 08/12/2023 47.7  37.0 - 54.0 fl Final    MPV 08/12/2023 11.0  6.0 - 12.0 fL Final    Platelets 08/12/2023 232  140 - 450 10*3/mm3 Final    Neutrophil % 08/12/2023 73.3  42.7 - 76.0 % Final    Lymphocyte % 08/12/2023 18.0 (L)  19.6 - 45.3 % Final    Monocyte % 08/12/2023 5.3  5.0 - 12.0 % Final    Eosinophil % 08/12/2023 2.4  0.3 - 6.2 % Final    Basophil % 08/12/2023 0.7  0.0 - 1.5 % Final    Immature Grans % 08/12/2023 0.3  0.0 - 0.5 % Final    Neutrophils, Absolute 08/12/2023 7.17 (H)  1.70 - 7.00 10*3/mm3 Final    Lymphocytes, Absolute 08/12/2023 1.76  0.70 - 3.10 10*3/mm3 Final    Monocytes, Absolute 08/12/2023 0.52  0.10 - 0.90 10*3/mm3 Final    Eosinophils, Absolute 08/12/2023 0.23  0.00 - 0.40 10*3/mm3 Final    Basophils, Absolute 08/12/2023 0.07  0.00 - 0.20 10*3/mm3 Final    Immature Grans, Absolute 08/12/2023 0.03  0.00 - 0.05 10*3/mm3 Final    nRBC 08/12/2023 0.0  0.0 - 0.2 /100 WBC Final    Extra Tube 08/12/2023 Hold for add-ons.   Final    Auto resulted.    Extra Tube 08/12/2023 hold for add-on   Final    Auto resulted    Extra Tube 08/12/2023 Hold for add-ons.   Final    Auto resulted.    Extra Tube 08/12/2023 Hold for add-ons.   Final    Auto resulted    RBC, UA 08/12/2023 31-50 (A)  None Seen, 0-2 /HPF Final    WBC, UA 08/12/2023 0-2  None Seen, 0-2 /HPF Final    Bacteria, UA 08/12/2023 None Seen  None Seen /HPF Final    Squamous Epithelial Cells, UA 08/12/2023 0-2  None Seen,  0-2 /HPF Final    Hyaline Casts, UA 08/12/2023 None Seen  None Seen /LPF Final    Methodology 08/12/2023 Automated Microscopy   Final   Office Visit on 07/28/2023   Component Date Value Ref Range Status    Glucose 07/28/2023 137 (H)  65 - 99 mg/dL Final    BUN 07/28/2023 14  8 - 23 mg/dL Final    Creatinine 07/28/2023 1.29 (H)  0.76 - 1.27 mg/dL Final    Sodium 07/28/2023 139  136 - 145 mmol/L Final    Potassium 07/28/2023 5.7 (H)  3.5 - 5.2 mmol/L Final    Chloride 07/28/2023 104  98 - 107 mmol/L Final    CO2 07/28/2023 22.0  22.0 - 29.0 mmol/L Final    Calcium 07/28/2023 9.8  8.6 - 10.5 mg/dL Final    Total Protein 07/28/2023 7.1  6.0 - 8.5 g/dL Final    Albumin 07/28/2023 4.5  3.5 - 5.2 g/dL Final    ALT (SGPT) 07/28/2023 12  1 - 41 U/L Final    AST (SGOT) 07/28/2023 18  1 - 40 U/L Final    Alkaline Phosphatase 07/28/2023 82  39 - 117 U/L Final    Total Bilirubin 07/28/2023 0.4  0.0 - 1.2 mg/dL Final    Globulin 07/28/2023 2.6  gm/dL Final    A/G Ratio 07/28/2023 1.7  g/dL Final    BUN/Creatinine Ratio 07/28/2023 10.9  7.0 - 25.0 Final    Anion Gap 07/28/2023 13.0  5.0 - 15.0 mmol/L Final    eGFR 07/28/2023 59.3 (L)  >60.0 mL/min/1.73 Final   Admission on 07/07/2023, Discharged on 07/07/2023   Component Date Value Ref Range Status    Glucose 07/07/2023 112  70 - 130 mg/dL Final    Meter: [id number] : [id number] lavern farrar   Pre-Admission Testing on 07/05/2023   Component Date Value Ref Range Status    WBC 07/05/2023 8.74  3.40 - 10.80 10*3/mm3 Final    RBC 07/05/2023 5.91 (H)  4.14 - 5.80 10*6/mm3 Final    Hemoglobin 07/05/2023 15.7  13.0 - 17.7 g/dL Final    Hematocrit 07/05/2023 48.5  37.5 - 51.0 % Final    MCV 07/05/2023 82.1  79.0 - 97.0 fL Final    MCH 07/05/2023 26.6  26.6 - 33.0 pg Final    MCHC 07/05/2023 32.4  31.5 - 35.7 g/dL Final    RDW 07/05/2023 14.9  12.3 - 15.4 % Final    RDW-SD 07/05/2023 45.0  37.0 - 54.0 fl Final    MPV 07/05/2023 11.3  6.0 - 12.0 fL Final    Platelets 07/05/2023 259   140 - 450 10*3/mm3 Final    Glucose 07/05/2023 65  65 - 99 mg/dL Final    BUN 07/05/2023 17  8 - 23 mg/dL Final    Creatinine 07/05/2023 1.54 (H)  0.76 - 1.27 mg/dL Final    Sodium 07/05/2023 135 (L)  136 - 145 mmol/L Final    Potassium 07/05/2023 4.7  3.5 - 5.2 mmol/L Final    Slight hemolysis detected by analyzer. Results may be affected.    Chloride 07/05/2023 104  98 - 107 mmol/L Final    CO2 07/05/2023 21.5 (L)  22.0 - 29.0 mmol/L Final    Calcium 07/05/2023 9.2  8.6 - 10.5 mg/dL Final    BUN/Creatinine Ratio 07/05/2023 11.0  7.0 - 25.0 Final    Anion Gap 07/05/2023 9.5  5.0 - 15.0 mmol/L Final    eGFR 07/05/2023 47.9 (L)  >60.0 mL/min/1.73 Final    QT Interval 07/05/2023 402  ms Final    QTC Interval 07/05/2023 402  ms Final   Office Visit on 06/14/2023   Component Date Value Ref Range Status    Color 06/14/2023 Yellow  Yellow, Straw, Dark Yellow, Maggy Final    Clarity, UA 06/14/2023 Cloudy (A)  Clear Final    Specific Gravity  06/14/2023 1.030  1.005 - 1.030 Final    pH, Urine 06/14/2023 5.5  5.0 - 8.0 Final    Leukocytes 06/14/2023 Negative  Negative Final    Nitrite, UA 06/14/2023 Negative  Negative Final    Protein, POC 06/14/2023 1+ (A)  Negative mg/dL Final    Glucose, UA 06/14/2023 Negative  Negative mg/dL Final    Ketones, UA 06/14/2023 1+ (A)  Negative Final    Urobilinogen, UA 06/14/2023 Normal  Normal, 0.2 E.U./dL Final    Bilirubin 06/14/2023 1 mg/dL (A)  Negative Final    Blood, UA 06/14/2023 3+ (A)  Negative Final    Lot Number 06/14/2023 n   Final    Expiration Date 06/14/2023 n   Final    Urine Culture 06/14/2023 No growth   Final        Procedure: None  Procedures     I have reviewed and agree with the above PMH, PSH, FMH, social history, medications, allergies, and labs.     Assessment/Plan:   Problem List Items Addressed This Visit    None  Visit Diagnoses       Right ureteral stone    -  Primary    Relevant Orders    Case Request (Completed)    Acute urinary retention                Health  Maintenance:   Health Maintenance Due   Topic Date Due    COLORECTAL CANCER SCREENING  Never done    Pneumococcal Vaccine 65+ (1 - PCV) Never done    HEPATITIS C SCREENING  Never done    ANNUAL WELLNESS VISIT  Never done    LIPID PANEL  05/03/2019    COVID-19 Vaccine (6 - Moderna series) 03/22/2023        Smoking Counseling: Former smoker.  Never used smokeless tobacco.    Urine Incontinence: Patient reports that he is not currently experiencing any symptoms of urinary incontinence.    Patient was given instructions and counseling regarding his condition or for health maintenance advice. Please see specific information pulled into the AVS if appropriate.    Patient Education:   Right ureteral calculus- It was discussed with the patient the presence of a distal right 6 mm UVJ stone. We discussed the various therapeutic options available including percutaneous nephrostolithotomy, ureteroscopy and extracorporeal shockwave  lithotripsy.  We discussed the risks of lithotripsy including the passage of stones leading to a 3% chance of Steinstrasse or a large string of stones in the distal ureter. In this incidence the patient was informed that a ureteroscopy is indicated for obstructing fragments.  Patient was informed of an extremely rare incidence of renal hematoma and the significance of this.  Patient was educated on percutaneous nephrostolithotomy and its use as well as the risks and benefits such as the need for postoperative hospitalization, and the risk of damage to the kidney and the remote risk of a nephrectomy.  We also discussed the use of ureteroscopy in the upper tracts and its decreased success rate to completely remove the stones likely causing stent placement leading to an additional procedure for removal.  We discussed the absolute relative indicators for intervention including the presence of sepsis and uncontrollable pain leading to need for urgent intervention.  We discussed placement of a stent if  indicated and the management of the stent as well.  We will schedule the patient for right uteroscopy with laser lithotripsy and possible stent placement on 8/23/2023.  Vies patient to continue with Flomax once daily.  Advised him to increase water intake 2 to 3 L/day.  Advised on alternate ibuprofen and Tylenol as needed for pain.  Acute urinary retention -patient has chronic indwelling Mortensen catheter in place and wishes for removal at this time.  Based on patient's CT report on 812 I do not believe the patient was dealing with acute urinary retention..  I believe he was having significant frequency and urgency secondary to a distal right stone.  We will remove the patient's catheter in office today and advised him to intermittently cath if needed.  Advised him return to our clinic tomorrow if he is unable to urinate voluntarily.  Otherwise we will see him for his surgery.  Patient verbalized understanding and agreed to plan of care.    Visit Diagnoses:    ICD-10-CM ICD-9-CM   1. Right ureteral stone  N20.1 592.1   2. Acute urinary retention  R33.8 788.29       Meds Ordered During Visit:  No orders of the defined types were placed in this encounter.      Follow Up Appointment: right ureteroscopy  No follow-ups on file.      This document has been electronically signed by Dominick Porter PA-C   August 14, 2023 14:53 EDT    Part of this note may be an electronic transcription/translation of spoken language to printed text using the Dragon Dictation System.

## 2023-08-14 NOTE — PROGRESS NOTES
"Chief Complaint:    Chief Complaint   Patient presents with    Urinary Retention     ER follow up       Vital Signs:   /87   Pulse 62   Ht 175.3 cm (69.02\")   Wt 93.4 kg (205 lb 12.8 oz)   BMI 30.38 kg/mý   Body mass index is 30.38 kg/mý.      HPI:  Ye Butler is a 73 y.o. male who presents today for hospital follow up    History of Present Illness  Mr. Butler presents to the clinic for an ER follow-up.  Patient has a past medical history significant for nephrolithiasis.  She has underwent a right extracortical shockwave lithotripsy by Dr. Melendrez roughly 2 months ago.  He was recently seen in office on 7/27/2023 and underwent KUB that showed a left nonobstructing intrarenal stone with no other stones noted.  Presented to the ER on 8/12/2023 for right-sided back and flank pain as well as difficulty urinating.  He had a CT scan of the abdomen pelvis completed that showed a distal 6 mm right UVJ calculus causing moderate obstructive uropathy.  He is well-known left ureteral calculus was unchanged.  There were also small 1 to 2 mm ureteral calculus proximal to this large 6 mm right UVJ stone.  Patient was intermittently cath that ER visit however could not urinate afterwards so a Mortensen catheter was anchored.  States he has had some slight blood clots since but has not identified any passage of the stone.  States that pain is well controlled at this time.  He is desirous to undergo surgery for stone removal.  We will schedule him for a right uteroscopy with laser lithotripsy on 8/23/2023.  Urinary Retention  Associated symptoms include fatigue. Pertinent negatives include no abdominal pain, chest pain, chills, congestion, fever, headaches, nausea, neck pain, rash or vomiting.     Past Medical History:  Past Medical History:   Diagnosis Date    Appendicitis     Arthritis     COPD (chronic obstructive pulmonary disease)     Coronary artery disease     COVID-19 2021    Diabetes mellitus     Disease of " thyroid gland     Diverticulitis     Dizzy     Elevated cholesterol     Emphysema lung     Emphysema lung     Emphysema lung     GERD (gastroesophageal reflux disease)     Hiatal hernia     Hyperlipidemia     Hypertension     Kidney stone     MI (myocardial infarction)     PONV (postoperative nausea and vomiting)     Stomach ulcer     Stroke 2018       Current Meds:  Current Outpatient Medications   Medication Sig Dispense Refill    aspirin 81 MG tablet Take 1 tablet by mouth Daily. 30 tablet 11    Docusate Sodium 100 MG capsule Take 100 mg by mouth As Needed for Constipation.      glimepiride (AMARYL) 4 MG tablet Take 1 tablet by mouth Daily.      HYDROcodone-acetaminophen (NORCO)  MG per tablet 10 tablets.      ketorolac (TORADOL) 10 MG tablet Take 1 tablet by mouth Every 6 (Six) Hours As Needed for Moderate Pain. 16 tablet 2    levothyroxine (SYNTHROID, LEVOTHROID) 50 MCG tablet Take 1 tablet by mouth Daily.  0    Linzess 145 MCG capsule capsule       lisinopril (PRINIVIL,ZESTRIL) 20 MG tablet Take 1 tablet by mouth 2 (Two) Times a Day. 60 tablet 5    memantine (NAMENDA) 5 MG tablet Take 1 tablet by mouth Daily.      metFORMIN (GLUCOPHAGE) 500 MG tablet Take 1 tablet by mouth 2 (Two) Times a Day With Meals.  0    metoprolol succinate XL (TOPROL-XL) 25 MG 24 hr tablet Take 1 tablet every day by oral route.      montelukast (SINGULAIR) 10 MG tablet Take 1 tablet by mouth Every Night.      nitroglycerin (NITROSTAT) 0.4 MG SL tablet 1 under the tongue as needed for angina, may repeat q5mins for up three doses 100 tablet 11    NON FORMULARY Morphine pump      Omeprazole Magnesium (PRILOSEC PO) 40 mg 1xday      ondansetron ODT (ZOFRAN-ODT) 4 MG disintegrating tablet Place 1 tablet on the tongue Every 6 (Six) Hours As Needed for Nausea or Vomiting. 10 tablet 0    sertraline (ZOLOFT) 25 MG tablet Take 1 tablet by mouth Daily.      simvastatin (ZOCOR) 40 MG tablet Take 1 tablet every day by oral route.       tamsulosin (FLOMAX) 0.4 MG capsule 24 hr capsule 1 capsule Daily.      Testosterone Cypionate (DEPOTESTOTERONE CYPIONATE) 200 MG/ML injection Inject 1 mL into the appropriate muscle as directed by prescriber Every 21 (Twenty-One) Days.      traZODone (DESYREL) 50 MG tablet       Zolpidem Tartrate 5 MG/ACT solution Take  by mouth.      amLODIPine (NORVASC) 5 MG tablet Take 1 tablet by mouth Daily.       No current facility-administered medications for this visit.        Allergies:   Allergies   Allergen Reactions    Claritin [Loratadine] Other (See Comments)     Possible kidney issues    Codeine Other (See Comments)     Kidney issues         Past Surgical History:  Past Surgical History:   Procedure Laterality Date    APPENDECTOMY      CARDIAC CATHETERIZATION      COLONOSCOPY      CORONARY STENT PLACEMENT      ENDOSCOPY      EXTRACORPOREAL SHOCK WAVE LITHOTRIPSY (ESWL) Right 7/23/2021    Procedure: EXTRACORPOREAL SHOCKWAVE LITHOTRIPSY;  Surgeon: Nabeel Melendrez MD;  Location: [facility];  Service: Urology;  Laterality: Right;    EXTRACORPOREAL SHOCK WAVE LITHOTRIPSY (ESWL) Right 7/7/2023    Procedure: EXTRACORPOREAL SHOCKWAVE LITHOTRIPSY;  Surgeon: Nabeel Melendrez MD;  Location: [facility];  Service: Urology;  Laterality: Right;    PAIN PUMP TRIAL      TURP / TRANSURETHRAL INCISION / DRAINAGE PROSTATE         Social History:  Social History     Socioeconomic History    Marital status:    Tobacco Use    Smoking status: Former     Years: 20.00     Types: Cigarettes    Smokeless tobacco: Never    Tobacco comments:     quit 20+ yrs ago    Vaping Use    Vaping Use: Never used   Substance and Sexual Activity    Alcohol use: No     Comment: former use 20 + yrs ago     Drug use: No    Sexual activity: Defer       Family History:  Family History   Problem Relation Age of Onset    Cancer Mother     Heart disease Father     Stroke Father        Review of Systems:  Review of Systems   Constitutional:   Positive for fatigue. Negative for chills, fever and unexpected weight change.   HENT:  Negative for congestion and sinus pressure.    Respiratory:  Negative for chest tightness and shortness of breath.    Cardiovascular:  Negative for chest pain.   Gastrointestinal:  Negative for abdominal pain, constipation, diarrhea, nausea and vomiting.   Genitourinary:  Negative for difficulty urinating, dysuria, flank pain, frequency, hematuria and urgency.   Musculoskeletal:  Negative for back pain and neck pain.   Skin:  Negative for rash.   Neurological:  Negative for dizziness and headaches.   Hematological:  Bruises/bleeds easily.   Psychiatric/Behavioral:  Negative for confusion and suicidal ideas. The patient is not nervous/anxious.      Physical Exam:  Physical Exam  Constitutional:       General: He is not in acute distress.     Appearance: Normal appearance.   HENT:      Head: Normocephalic and atraumatic.      Nose: Nose normal.      Mouth/Throat:      Mouth: Mucous membranes are moist.   Eyes:      Conjunctiva/sclera: Conjunctivae normal.   Cardiovascular:      Rate and Rhythm: Normal rate and regular rhythm.      Pulses: Normal pulses.      Heart sounds: Normal heart sounds.   Pulmonary:      Effort: Pulmonary effort is normal.      Breath sounds: Normal breath sounds.   Abdominal:      General: Bowel sounds are normal.      Palpations: Abdomen is soft.      Tenderness: There is no right CVA tenderness or left CVA tenderness.   Musculoskeletal:         General: Normal range of motion.      Cervical back: Normal range of motion.   Skin:     General: Skin is warm.   Neurological:      General: No focal deficit present.      Mental Status: He is alert and oriented to person, place, and time.   Psychiatric:         Mood and Affect: Mood normal.         Behavior: Behavior normal.         Thought Content: Thought content normal.         Judgment: Judgment normal.       Recent Image (CT and/or KUB):   CT Abdomen and  Pelvis: No results found for this or any previous visit.     CT Stone Protocol: Results for orders placed during the hospital encounter of 08/12/23    CT Abdomen Pelvis Stone Protocol    Narrative  Comparison: April 19, 2023.    Lung bases are clear. Slight nodular contour the liver, question  underlying cirrhosis. The gallbladder, pancreas, spleen and adrenal  glands are unremarkable. Nonobstructive left renal stones with the  dominant stone measuring measures 1.2 cm. Stones distal right ureter  with the dominant stone at the distal right ureter measures 6 mm. More  proximal stones measuring 1-2 mm, findings causing moderate hydroureter  and hydronephrosis with perinephric stranding. Mild right renal scarring  with renal atrophy. No obstructive uropathy seen on the left. Small  fat-containing left inguinal hernia. Diverticulosis is noted without  diverticulitis. No bowel obstruction, free air or significant free fluid  seen. The appendix is not seen. Multilevel degenerative changes  thoracolumbar spine.    Impression  Impression:  1. Distal right ureter stone causing moderate obstructive uropathy.    This report was finalized on 8/12/2023 4:14 PM by Phil Lazar DO.     KUB: Results for orders placed during the hospital encounter of 07/28/23    XR Abdomen KUB    Narrative  EXAM:  XR Abdomen, 1 View    EXAM DATE:  7/28/2023 10:53 AM    CLINICAL HISTORY:  flank pain; N20.0-Calculus of kidney    TECHNIQUE:  Frontal supine view of the abdomen/pelvis.    COMPARISON:  06/14/2023    FINDINGS:  Gastrointestinal tract:  Unremarkable.  No dilation.  Organs:  Small nonobstructing right kidney stone.  14.6 mm stone in  the region of the lower pole left kidney.  Bones/joints:  Unremarkable.  Soft tissues:  Surgical clips lower pelvis.  Other findings:  Prostate calcifications noted.    Impression  1.  Small nonobstructing right kidney stone.  2.  14.6 mm stone in the region of the lower pole left kidney.    This report was  finalized on 7/28/2023 11:04 AM by Dr. Magdy Daley MD.       Labs:  Brief Urine Lab Results  (Last result in the past 365 days)        Color   Clarity   Blood   Leuk Est   Nitrite   Protein   CREAT   Urine HCG        08/12/23 1630 Yellow   Clear   Moderate (2+)   Small (1+)   Negative   Trace                 Admission on 08/12/2023, Discharged on 08/12/2023   Component Date Value Ref Range Status    Glucose 08/12/2023 80  65 - 99 mg/dL Final    BUN 08/12/2023 18  8 - 23 mg/dL Final    Creatinine 08/12/2023 1.62 (H)  0.76 - 1.27 mg/dL Final    Sodium 08/12/2023 139  136 - 145 mmol/L Final    Potassium 08/12/2023 4.2  3.5 - 5.2 mmol/L Final    Slight hemolysis detected by analyzer. Results may be affected.    Chloride 08/12/2023 103  98 - 107 mmol/L Final    CO2 08/12/2023 25.2  22.0 - 29.0 mmol/L Final    Calcium 08/12/2023 9.3  8.6 - 10.5 mg/dL Final    Total Protein 08/12/2023 7.0  6.0 - 8.5 g/dL Final    Albumin 08/12/2023 4.2  3.5 - 5.2 g/dL Final    ALT (SGPT) 08/12/2023 10  1 - 41 U/L Final    AST (SGOT) 08/12/2023 18  1 - 40 U/L Final    Alkaline Phosphatase 08/12/2023 88  39 - 117 U/L Final    Total Bilirubin 08/12/2023 0.3  0.0 - 1.2 mg/dL Final    Globulin 08/12/2023 2.8  gm/dL Final    A/G Ratio 08/12/2023 1.5  g/dL Final    BUN/Creatinine Ratio 08/12/2023 11.1  7.0 - 25.0 Final    Anion Gap 08/12/2023 10.8  5.0 - 15.0 mmol/L Final    eGFR 08/12/2023 45.1 (L)  >60.0 mL/min/1.73 Final    Color, UA 08/12/2023 Yellow  Yellow, Straw Final    Appearance, UA 08/12/2023 Clear  Clear Final    pH, UA 08/12/2023 6.0  5.0 - 8.0 Final    Specific Gravity, UA 08/12/2023 1.015  1.005 - 1.030 Final    Glucose, UA 08/12/2023 Negative  Negative Final    Ketones, UA 08/12/2023 Trace (A)  Negative Final    Bilirubin, UA 08/12/2023 Negative  Negative Final    Blood, UA 08/12/2023 Moderate (2+) (A)  Negative Final    Protein, UA 08/12/2023 Trace (A)  Negative Final    Leuk Esterase, UA 08/12/2023 Small (1+) (A)  Negative  Final    Nitrite, UA 08/12/2023 Negative  Negative Final    Urobilinogen, UA 08/12/2023 0.2 E.U./dL  0.2 - 1.0 E.U./dL Final    WBC 08/12/2023 9.78  3.40 - 10.80 10*3/mm3 Final    RBC 08/12/2023 5.85 (H)  4.14 - 5.80 10*6/mm3 Final    Hemoglobin 08/12/2023 15.7  13.0 - 17.7 g/dL Final    Hematocrit 08/12/2023 48.3  37.5 - 51.0 % Final    MCV 08/12/2023 82.6  79.0 - 97.0 fL Final    MCH 08/12/2023 26.8  26.6 - 33.0 pg Final    MCHC 08/12/2023 32.5  31.5 - 35.7 g/dL Final    RDW 08/12/2023 16.1 (H)  12.3 - 15.4 % Final    RDW-SD 08/12/2023 47.7  37.0 - 54.0 fl Final    MPV 08/12/2023 11.0  6.0 - 12.0 fL Final    Platelets 08/12/2023 232  140 - 450 10*3/mm3 Final    Neutrophil % 08/12/2023 73.3  42.7 - 76.0 % Final    Lymphocyte % 08/12/2023 18.0 (L)  19.6 - 45.3 % Final    Monocyte % 08/12/2023 5.3  5.0 - 12.0 % Final    Eosinophil % 08/12/2023 2.4  0.3 - 6.2 % Final    Basophil % 08/12/2023 0.7  0.0 - 1.5 % Final    Immature Grans % 08/12/2023 0.3  0.0 - 0.5 % Final    Neutrophils, Absolute 08/12/2023 7.17 (H)  1.70 - 7.00 10*3/mm3 Final    Lymphocytes, Absolute 08/12/2023 1.76  0.70 - 3.10 10*3/mm3 Final    Monocytes, Absolute 08/12/2023 0.52  0.10 - 0.90 10*3/mm3 Final    Eosinophils, Absolute 08/12/2023 0.23  0.00 - 0.40 10*3/mm3 Final    Basophils, Absolute 08/12/2023 0.07  0.00 - 0.20 10*3/mm3 Final    Immature Grans, Absolute 08/12/2023 0.03  0.00 - 0.05 10*3/mm3 Final    nRBC 08/12/2023 0.0  0.0 - 0.2 /100 WBC Final    Extra Tube 08/12/2023 Hold for add-ons.   Final    Auto resulted.    Extra Tube 08/12/2023 hold for add-on   Final    Auto resulted    Extra Tube 08/12/2023 Hold for add-ons.   Final    Auto resulted.    Extra Tube 08/12/2023 Hold for add-ons.   Final    Auto resulted    RBC, UA 08/12/2023 31-50 (A)  None Seen, 0-2 /HPF Final    WBC, UA 08/12/2023 0-2  None Seen, 0-2 /HPF Final    Bacteria, UA 08/12/2023 None Seen  None Seen /HPF Final    Squamous Epithelial Cells, UA 08/12/2023 0-2  None Seen,  0-2 /HPF Final    Hyaline Casts, UA 08/12/2023 None Seen  None Seen /LPF Final    Methodology 08/12/2023 Automated Microscopy   Final   Office Visit on 07/28/2023   Component Date Value Ref Range Status    Glucose 07/28/2023 137 (H)  65 - 99 mg/dL Final    BUN 07/28/2023 14  8 - 23 mg/dL Final    Creatinine 07/28/2023 1.29 (H)  0.76 - 1.27 mg/dL Final    Sodium 07/28/2023 139  136 - 145 mmol/L Final    Potassium 07/28/2023 5.7 (H)  3.5 - 5.2 mmol/L Final    Chloride 07/28/2023 104  98 - 107 mmol/L Final    CO2 07/28/2023 22.0  22.0 - 29.0 mmol/L Final    Calcium 07/28/2023 9.8  8.6 - 10.5 mg/dL Final    Total Protein 07/28/2023 7.1  6.0 - 8.5 g/dL Final    Albumin 07/28/2023 4.5  3.5 - 5.2 g/dL Final    ALT (SGPT) 07/28/2023 12  1 - 41 U/L Final    AST (SGOT) 07/28/2023 18  1 - 40 U/L Final    Alkaline Phosphatase 07/28/2023 82  39 - 117 U/L Final    Total Bilirubin 07/28/2023 0.4  0.0 - 1.2 mg/dL Final    Globulin 07/28/2023 2.6  gm/dL Final    A/G Ratio 07/28/2023 1.7  g/dL Final    BUN/Creatinine Ratio 07/28/2023 10.9  7.0 - 25.0 Final    Anion Gap 07/28/2023 13.0  5.0 - 15.0 mmol/L Final    eGFR 07/28/2023 59.3 (L)  >60.0 mL/min/1.73 Final   Admission on 07/07/2023, Discharged on 07/07/2023   Component Date Value Ref Range Status    Glucose 07/07/2023 112  70 - 130 mg/dL Final    Meter: [id number] : [id number] lavern farrar   Pre-Admission Testing on 07/05/2023   Component Date Value Ref Range Status    WBC 07/05/2023 8.74  3.40 - 10.80 10*3/mm3 Final    RBC 07/05/2023 5.91 (H)  4.14 - 5.80 10*6/mm3 Final    Hemoglobin 07/05/2023 15.7  13.0 - 17.7 g/dL Final    Hematocrit 07/05/2023 48.5  37.5 - 51.0 % Final    MCV 07/05/2023 82.1  79.0 - 97.0 fL Final    MCH 07/05/2023 26.6  26.6 - 33.0 pg Final    MCHC 07/05/2023 32.4  31.5 - 35.7 g/dL Final    RDW 07/05/2023 14.9  12.3 - 15.4 % Final    RDW-SD 07/05/2023 45.0  37.0 - 54.0 fl Final    MPV 07/05/2023 11.3  6.0 - 12.0 fL Final    Platelets 07/05/2023 259   140 - 450 10*3/mm3 Final    Glucose 07/05/2023 65  65 - 99 mg/dL Final    BUN 07/05/2023 17  8 - 23 mg/dL Final    Creatinine 07/05/2023 1.54 (H)  0.76 - 1.27 mg/dL Final    Sodium 07/05/2023 135 (L)  136 - 145 mmol/L Final    Potassium 07/05/2023 4.7  3.5 - 5.2 mmol/L Final    Slight hemolysis detected by analyzer. Results may be affected.    Chloride 07/05/2023 104  98 - 107 mmol/L Final    CO2 07/05/2023 21.5 (L)  22.0 - 29.0 mmol/L Final    Calcium 07/05/2023 9.2  8.6 - 10.5 mg/dL Final    BUN/Creatinine Ratio 07/05/2023 11.0  7.0 - 25.0 Final    Anion Gap 07/05/2023 9.5  5.0 - 15.0 mmol/L Final    eGFR 07/05/2023 47.9 (L)  >60.0 mL/min/1.73 Final    QT Interval 07/05/2023 402  ms Final    QTC Interval 07/05/2023 402  ms Final   Office Visit on 06/14/2023   Component Date Value Ref Range Status    Color 06/14/2023 Yellow  Yellow, Straw, Dark Yellow, Maggy Final    Clarity, UA 06/14/2023 Cloudy (A)  Clear Final    Specific Gravity  06/14/2023 1.030  1.005 - 1.030 Final    pH, Urine 06/14/2023 5.5  5.0 - 8.0 Final    Leukocytes 06/14/2023 Negative  Negative Final    Nitrite, UA 06/14/2023 Negative  Negative Final    Protein, POC 06/14/2023 1+ (A)  Negative mg/dL Final    Glucose, UA 06/14/2023 Negative  Negative mg/dL Final    Ketones, UA 06/14/2023 1+ (A)  Negative Final    Urobilinogen, UA 06/14/2023 Normal  Normal, 0.2 E.U./dL Final    Bilirubin 06/14/2023 1 mg/dL (A)  Negative Final    Blood, UA 06/14/2023 3+ (A)  Negative Final    Lot Number 06/14/2023 n   Final    Expiration Date 06/14/2023 n   Final    Urine Culture 06/14/2023 No growth   Final        Procedure: None  Procedures     I have reviewed and agree with the above PMH, PSH, FMH, social history, medications, allergies, and labs.     Assessment/Plan:   Problem List Items Addressed This Visit    None  Visit Diagnoses       Right ureteral stone    -  Primary    Relevant Orders    Case Request (Completed)    Acute urinary retention                Health  Maintenance:   Health Maintenance Due   Topic Date Due    COLORECTAL CANCER SCREENING  Never done    Pneumococcal Vaccine 65+ (1 - PCV) Never done    HEPATITIS C SCREENING  Never done    ANNUAL WELLNESS VISIT  Never done    LIPID PANEL  05/03/2019    COVID-19 Vaccine (6 - Moderna series) 03/22/2023        Smoking Counseling: Former smoker.  Never used smokeless tobacco.    Urine Incontinence: Patient reports that he is not currently experiencing any symptoms of urinary incontinence.    Patient was given instructions and counseling regarding his condition or for health maintenance advice. Please see specific information pulled into the AVS if appropriate.    Patient Education:   Right ureteral calculus- It was discussed with the patient the presence of a distal right 6 mm UVJ stone. We discussed the various therapeutic options available including percutaneous nephrostolithotomy, ureteroscopy and extracorporeal shockwave  lithotripsy.  We discussed the risks of lithotripsy including the passage of stones leading to a 3% chance of Steinstrasse or a large string of stones in the distal ureter. In this incidence the patient was informed that a ureteroscopy is indicated for obstructing fragments.  Patient was informed of an extremely rare incidence of renal hematoma and the significance of this.  Patient was educated on percutaneous nephrostolithotomy and its use as well as the risks and benefits such as the need for postoperative hospitalization, and the risk of damage to the kidney and the remote risk of a nephrectomy.  We also discussed the use of ureteroscopy in the upper tracts and its decreased success rate to completely remove the stones likely causing stent placement leading to an additional procedure for removal.  We discussed the absolute relative indicators for intervention including the presence of sepsis and uncontrollable pain leading to need for urgent intervention.  We discussed placement of a stent if  indicated and the management of the stent as well.  We will schedule the patient for right uteroscopy with laser lithotripsy and possible stent placement on 8/23/2023.  Vies patient to continue with Flomax once daily.  Advised him to increase water intake 2 to 3 L/day.  Advised on alternate ibuprofen and Tylenol as needed for pain.  Acute urinary retention -patient has chronic indwelling Mortensen catheter in place and wishes for removal at this time.  Based on patient's CT report on 812 I do not believe the patient was dealing with acute urinary retention..  I believe he was having significant frequency and urgency secondary to a distal right stone.  We will remove the patient's catheter in office today and advised him to intermittently cath if needed.  Advised him return to our clinic tomorrow if he is unable to urinate voluntarily.  Otherwise we will see him for his surgery.  Patient verbalized understanding and agreed to plan of care.    Visit Diagnoses:    ICD-10-CM ICD-9-CM   1. Right ureteral stone  N20.1 592.1   2. Acute urinary retention  R33.8 788.29       Meds Ordered During Visit:  No orders of the defined types were placed in this encounter.      Follow Up Appointment: right ureteroscopy  No follow-ups on file.      This document has been electronically signed by Dominick Porter PA-C   August 14, 2023 14:53 EDT    Part of this note may be an electronic transcription/translation of spoken language to printed text using the Dragon Dictation System.

## 2023-08-23 ENCOUNTER — HOSPITAL ENCOUNTER (OUTPATIENT)
Facility: HOSPITAL | Age: 73
Setting detail: HOSPITAL OUTPATIENT SURGERY
Discharge: HOME | End: 2023-08-23
Attending: UROLOGY | Admitting: UROLOGY
Payer: MEDICARE

## 2023-08-23 ENCOUNTER — ANESTHESIA EVENT (OUTPATIENT)
Dept: PERIOP | Facility: HOSPITAL | Age: 73
End: 2023-08-23
Payer: MEDICARE

## 2023-08-23 ENCOUNTER — ANESTHESIA (OUTPATIENT)
Dept: PERIOP | Facility: HOSPITAL | Age: 73
End: 2023-08-23
Payer: MEDICARE

## 2023-08-23 VITALS
TEMPERATURE: 97.6 F | DIASTOLIC BLOOD PRESSURE: 90 MMHG | SYSTOLIC BLOOD PRESSURE: 187 MMHG | WEIGHT: 203 LBS | RESPIRATION RATE: 18 BRPM | HEART RATE: 54 BPM | HEIGHT: 69 IN | OXYGEN SATURATION: 98 % | BODY MASS INDEX: 30.07 KG/M2

## 2023-08-23 DIAGNOSIS — N20.1 RIGHT URETERAL STONE: ICD-10-CM

## 2023-08-23 PROCEDURE — G0463 HOSPITAL OUTPT CLINIC VISIT: HCPCS | Performed by: UROLOGY

## 2023-08-23 PROCEDURE — 82365 CALCULUS SPECTROSCOPY: CPT | Performed by: UROLOGY

## 2023-08-23 RX ORDER — MIDAZOLAM HYDROCHLORIDE 1 MG/ML
0.5 INJECTION INTRAMUSCULAR; INTRAVENOUS
Status: DISCONTINUED | OUTPATIENT
Start: 2023-08-23 | End: 2023-08-28 | Stop reason: HOSPADM

## 2023-08-23 RX ORDER — SODIUM CHLORIDE 9 MG/ML
40 INJECTION, SOLUTION INTRAVENOUS AS NEEDED
Status: DISCONTINUED | OUTPATIENT
Start: 2023-08-23 | End: 2023-08-28 | Stop reason: HOSPADM

## 2023-08-23 RX ORDER — GENTAMICIN SULFATE 80 MG/100ML
80 INJECTION, SOLUTION INTRAVENOUS ONCE
Status: DISCONTINUED | OUTPATIENT
Start: 2023-08-23 | End: 2023-08-28 | Stop reason: HOSPADM

## 2023-08-23 RX ORDER — SODIUM CHLORIDE, SODIUM LACTATE, POTASSIUM CHLORIDE, CALCIUM CHLORIDE 600; 310; 30; 20 MG/100ML; MG/100ML; MG/100ML; MG/100ML
125 INJECTION, SOLUTION INTRAVENOUS ONCE
Status: DISCONTINUED | OUTPATIENT
Start: 2023-08-23 | End: 2023-08-28 | Stop reason: HOSPADM

## 2023-08-23 RX ORDER — SODIUM CHLORIDE 0.9 % (FLUSH) 0.9 %
10 SYRINGE (ML) INJECTION EVERY 12 HOURS SCHEDULED
Status: DISCONTINUED | OUTPATIENT
Start: 2023-08-23 | End: 2023-08-28 | Stop reason: HOSPADM

## 2023-08-23 RX ORDER — SODIUM CHLORIDE 0.9 % (FLUSH) 0.9 %
10 SYRINGE (ML) INJECTION AS NEEDED
Status: DISCONTINUED | OUTPATIENT
Start: 2023-08-23 | End: 2023-08-28 | Stop reason: HOSPADM

## 2023-08-23 NOTE — ANESTHESIA PREPROCEDURE EVALUATION
Anesthesia Evaluation     Patient summary reviewed and Nursing notes reviewed   history of anesthetic complications:  PONV  NPO Solid Status: > 8 hours  NPO Liquid Status: > 8 hours           Airway   Mallampati: II  TM distance: >3 FB  Neck ROM: full  No difficulty expected and Large neck circumference  Dental    (+) edentulous    Pulmonary - normal exam    breath sounds clear to auscultation  (+) a smoker Former, COPD,shortness of breath  Cardiovascular - normal exam    ECG reviewed  Patient on routine beta blocker and Beta blocker given within 24 hours of surgery  Rhythm: regular  Rate: normal    (+) hypertension, past MI , CAD, IVAN, hyperlipidemia      Neuro/Psych  (+) CVA, dizziness/light headedness  GI/Hepatic/Renal/Endo    (+) obesity, GERD, PUD, renal disease, diabetes mellitus type 2 poorly controlled, thyroid problem hypothyroidism    Musculoskeletal     Abdominal  - normal exam   Substance History - negative use     OB/GYN negative ob/gyn ROS         Other   arthritis,     ROS/Med Hx Other: Echo 4/4/2023:  1.  LV size, function, and wall motion are normal.  Visually estimated ejection fraction is 50 to 55%.  Mild concentric left ventricular hypertrophy with grade 1 diastolic dysfunction.  Mild to moderate left atrial and mild right atrial enlargement.  RV size and function are preserved.  No septal defect or intracavitary mass or thrombus.    2.  Valves are morphologically normal with no stenotic lesions or valve associated masses or thrombi.  There is trivial MR and TR.    3.  No pericardial or great vessel pathology.    4.  Pulmonary artery systolic pressures are estimated in the low 30s.                     Anesthesia Plan    ASA 3     general   total IV anesthesia  intravenous induction     Anesthetic plan, risks, benefits, and alternatives have been provided, discussed and informed consent has been obtained with: patient.  Pre-procedure education provided  Use of blood products discussed with   Consented to blood products.    Plan discussed with CRNA.    CODE STATUS:

## 2023-08-29 LAB
COLOR STONE: NORMAL
COM MFR STONE: 30 %
COMPN STONE: NORMAL
LABORATORY COMMENT REPORT: NORMAL
Lab: NORMAL
Lab: NORMAL
PHOTO: NORMAL
SIZE STONE: NORMAL MM
SPEC SOURCE SUBJ: NORMAL
URATE MFR STONE: 70 %
WT STONE: 198 MG

## 2023-09-19 ENCOUNTER — OFFICE VISIT (OUTPATIENT)
Dept: UROLOGY | Facility: CLINIC | Age: 73
End: 2023-09-19
Payer: MEDICARE

## 2023-09-19 VITALS
SYSTOLIC BLOOD PRESSURE: 186 MMHG | DIASTOLIC BLOOD PRESSURE: 77 MMHG | HEIGHT: 69 IN | WEIGHT: 205.6 LBS | HEART RATE: 57 BPM | BODY MASS INDEX: 30.45 KG/M2

## 2023-09-19 DIAGNOSIS — N20.0 LEFT RENAL STONE: Primary | ICD-10-CM

## 2023-09-19 PROBLEM — N20.1 RIGHT URETERAL STONE: Status: RESOLVED | Noted: 2023-08-14 | Resolved: 2023-09-19

## 2023-09-19 RX ORDER — GENTAMICIN SULFATE 80 MG/100ML
80 INJECTION, SOLUTION INTRAVENOUS ONCE
OUTPATIENT
Start: 2023-09-19 | End: 2023-09-19

## 2023-09-19 RX ORDER — PRAVASTATIN SODIUM 40 MG
TABLET ORAL
COMMUNITY
Start: 2023-08-30

## 2023-09-19 RX ORDER — PANTOPRAZOLE SODIUM 40 MG/1
TABLET, DELAYED RELEASE ORAL
COMMUNITY
Start: 2023-08-28

## 2023-09-19 NOTE — H&P (VIEW-ONLY)
"Chief Complaint:    Chief Complaint   Patient presents with    right ureteral stone        Vital Signs:   BP (!) 186/77 (BP Location: Left arm, Patient Position: Sitting, Cuff Size: Adult)   Pulse 57   Ht 175.3 cm (69.02\")   Wt 93.3 kg (205 lb 9.6 oz)   BMI 30.35 kg/m²   Body mass index is 30.35 kg/m².      HPI:  Ye Butler is a 73 y.o. male who presents today for follow up    History of Present Illness  Mr. Butler presents to the clinic for follow-up for nephrolithiasis.  Patient was recently scheduled to undergo a uteroscopy with laser lithotripsy by Dr. Melendrez for a distal 6 mm right ureteral calculus however day before surgery he passed the stone voluntarily.  His stone was sent off for analysis and was found to be 70% uric acid and 30% calcium oxalate monohydrate.  Patient states that since passing the stone he has had minimal problems.  He does report some left-sided intermittent back and flank pain from his previous 1.2 cm lower pole left intrarenal stone.  He denies any gross hematuria, frequency, urgency, dysuria, difficulty urinating, or fever/chills.  He gets up roughly 1 time throughout the night.  He is desirous to undergo surgery for stone removal of his left intrarenal stone.  We will schedule him for October 6.    Past Medical History:  Past Medical History:   Diagnosis Date    Appendicitis     Arthritis     COPD (chronic obstructive pulmonary disease)     Coronary artery disease     COVID-19 2021    Diabetes mellitus     Disease of thyroid gland     Diverticulitis     Dizzy     Elevated cholesterol     Emphysema lung     Emphysema lung     Emphysema lung     GERD (gastroesophageal reflux disease)     Hiatal hernia     Hyperlipidemia     Hypertension     Kidney stone     MI (myocardial infarction)     PONV (postoperative nausea and vomiting)     Stomach ulcer     Stroke 2018       Current Meds:  Current Outpatient Medications   Medication Sig Dispense Refill    amLODIPine (NORVASC) 5 MG " tablet Take 1 tablet by mouth Daily.      aspirin 81 MG tablet Take 1 tablet by mouth Daily. 30 tablet 11    Docusate Sodium 100 MG capsule Take 1 tablet by mouth As Needed for Constipation.      glimepiride (AMARYL) 4 MG tablet Take 1 tablet by mouth Daily.      HYDROcodone-acetaminophen (NORCO)  MG per tablet 10 tablets.      ketorolac (TORADOL) 10 MG tablet Take 1 tablet by mouth Every 6 (Six) Hours As Needed for Moderate Pain. 16 tablet 2    levothyroxine (SYNTHROID, LEVOTHROID) 50 MCG tablet Take 1 tablet by mouth Daily.  0    Linzess 145 MCG capsule capsule       lisinopril (PRINIVIL,ZESTRIL) 20 MG tablet Take 1 tablet by mouth 2 (Two) Times a Day. 60 tablet 5    memantine (NAMENDA) 5 MG tablet Take 1 tablet by mouth Daily.      metFORMIN (GLUCOPHAGE) 500 MG tablet Take 1 tablet by mouth 2 (Two) Times a Day With Meals.  0    metoprolol tartrate (LOPRESSOR) 25 MG tablet       montelukast (SINGULAIR) 10 MG tablet Take 1 tablet by mouth Every Night.      nitroglycerin (NITROSTAT) 0.4 MG SL tablet 1 under the tongue as needed for angina, may repeat q5mins for up three doses 100 tablet 11    NON FORMULARY Morphine pump      ondansetron ODT (ZOFRAN-ODT) 4 MG disintegrating tablet Place 1 tablet on the tongue Every 6 (Six) Hours As Needed for Nausea or Vomiting. 10 tablet 0    pantoprazole (PROTONIX) 40 MG EC tablet       pravastatin (PRAVACHOL) 40 MG tablet       sertraline (ZOLOFT) 25 MG tablet Take 1 tablet by mouth Daily.      tamsulosin (FLOMAX) 0.4 MG capsule 24 hr capsule 1 capsule Daily.      Testosterone Cypionate (DEPOTESTOTERONE CYPIONATE) 200 MG/ML injection Inject 1 mL into the appropriate muscle as directed by prescriber Every 21 (Twenty-One) Days.      traZODone (DESYREL) 50 MG tablet       Zolpidem Tartrate 5 MG/ACT solution Take  by mouth.       No current facility-administered medications for this visit.        Allergies:   Allergies   Allergen Reactions    Claritin [Loratadine] Other (See  Comments)     Possible kidney issues    Codeine Other (See Comments)     Kidney issues         Past Surgical History:  Past Surgical History:   Procedure Laterality Date    ABDOMINAL SURGERY      APPENDECTOMY      CARDIAC CATHETERIZATION      COLONOSCOPY      CORONARY STENT PLACEMENT      ENDOSCOPY      EXTRACORPOREAL SHOCK WAVE LITHOTRIPSY (ESWL) Right 07/23/2021    Procedure: EXTRACORPOREAL SHOCKWAVE LITHOTRIPSY;  Surgeon: Nabeel Melendrez MD;  Location: [facility];  Service: Urology;  Laterality: Right;    EXTRACORPOREAL SHOCK WAVE LITHOTRIPSY (ESWL) Right 07/07/2023    Procedure: EXTRACORPOREAL SHOCKWAVE LITHOTRIPSY;  Surgeon: Nabeel Melendrez MD;  Location: [facility];  Service: Urology;  Laterality: Right;    PAIN PUMP TRIAL      TURP / TRANSURETHRAL INCISION / DRAINAGE PROSTATE         Social History:  Social History     Socioeconomic History    Marital status:    Tobacco Use    Smoking status: Former     Years: 20.00     Types: Cigarettes    Smokeless tobacco: Never    Tobacco comments:     quit 20+ yrs ago    Vaping Use    Vaping Use: Never used   Substance and Sexual Activity    Alcohol use: No     Comment: former use 20 + yrs ago     Drug use: No    Sexual activity: Defer       Family History:  Family History   Problem Relation Age of Onset    Cancer Mother     Heart disease Father     Stroke Father        Review of Systems:  Review of Systems   Constitutional:  Positive for fatigue. Negative for chills, fever and unexpected weight change.   HENT:  Negative for congestion and sinus pressure.    Respiratory:  Negative for chest tightness and shortness of breath.    Cardiovascular:  Negative for chest pain.   Gastrointestinal:  Negative for abdominal pain, constipation, diarrhea, nausea and vomiting.   Genitourinary:  Negative for difficulty urinating, dysuria, flank pain, frequency, hematuria and urgency.   Musculoskeletal:  Positive for back pain. Negative for neck pain.   Skin:   Negative for rash.   Neurological:  Negative for dizziness and headaches.   Hematological:  Bruises/bleeds easily.   Psychiatric/Behavioral:  Negative for confusion and suicidal ideas. The patient is not nervous/anxious.      Physical Exam:  Physical Exam  Constitutional:       General: He is not in acute distress.     Appearance: Normal appearance.   HENT:      Head: Normocephalic and atraumatic.      Nose: Nose normal.      Mouth/Throat:      Mouth: Mucous membranes are moist.   Eyes:      Conjunctiva/sclera: Conjunctivae normal.   Cardiovascular:      Rate and Rhythm: Normal rate and regular rhythm.      Pulses: Normal pulses.      Heart sounds: Normal heart sounds.   Pulmonary:      Effort: Pulmonary effort is normal.      Breath sounds: Normal breath sounds.   Abdominal:      General: Bowel sounds are normal.      Palpations: Abdomen is soft.      Tenderness: There is no right CVA tenderness or left CVA tenderness.   Musculoskeletal:         General: Normal range of motion.      Cervical back: Normal range of motion.   Skin:     General: Skin is warm.   Neurological:      General: No focal deficit present.      Mental Status: He is alert and oriented to person, place, and time.   Psychiatric:         Mood and Affect: Mood normal.         Behavior: Behavior normal.         Thought Content: Thought content normal.         Judgment: Judgment normal.       Recent Image (CT and/or KUB):   CT Abdomen and Pelvis: No results found for this or any previous visit.     CT Stone Protocol: Results for orders placed during the hospital encounter of 08/12/23    CT Abdomen Pelvis Stone Protocol    Narrative  Comparison: April 19, 2023.    Lung bases are clear. Slight nodular contour the liver, question  underlying cirrhosis. The gallbladder, pancreas, spleen and adrenal  glands are unremarkable. Nonobstructive left renal stones with the  dominant stone measuring measures 1.2 cm. Stones distal right ureter  with the dominant  stone at the distal right ureter measures 6 mm. More  proximal stones measuring 1-2 mm, findings causing moderate hydroureter  and hydronephrosis with perinephric stranding. Mild right renal scarring  with renal atrophy. No obstructive uropathy seen on the left. Small  fat-containing left inguinal hernia. Diverticulosis is noted without  diverticulitis. No bowel obstruction, free air or significant free fluid  seen. The appendix is not seen. Multilevel degenerative changes  thoracolumbar spine.    Impression  Impression:  1. Distal right ureter stone causing moderate obstructive uropathy.    This report was finalized on 8/12/2023 4:14 PM by Phil Lazar DO.     KUB: Results for orders placed during the hospital encounter of 07/28/23    XR Abdomen KUB    Narrative  EXAM:  XR Abdomen, 1 View    EXAM DATE:  7/28/2023 10:53 AM    CLINICAL HISTORY:  flank pain; N20.0-Calculus of kidney    TECHNIQUE:  Frontal supine view of the abdomen/pelvis.    COMPARISON:  06/14/2023    FINDINGS:  Gastrointestinal tract:  Unremarkable.  No dilation.  Organs:  Small nonobstructing right kidney stone.  14.6 mm stone in  the region of the lower pole left kidney.  Bones/joints:  Unremarkable.  Soft tissues:  Surgical clips lower pelvis.  Other findings:  Prostate calcifications noted.    Impression  1.  Small nonobstructing right kidney stone.  2.  14.6 mm stone in the region of the lower pole left kidney.    This report was finalized on 7/28/2023 11:04 AM by Dr. Magdy Daley MD.       Labs:  Brief Urine Lab Results  (Last result in the past 365 days)        Color   Clarity   Blood   Leuk Est   Nitrite   Protein   CREAT   Urine HCG        08/12/23 1630 Yellow   Clear   Moderate (2+)   Small (1+)   Negative   Trace                 Admission on 08/23/2023, Discharged on 08/23/2023   Component Date Value Ref Range Status    Stone Source 08/23/2023 Comment   Final    Not provided    Color 08/23/2023 Springer   Final    Size 08/23/2023 7x10   mm Final    Multiple pieces received.  Dimensions of the largest piece  reported.    Stone Weight 08/23/2023 198  mg Final    Composition 08/23/2023 Comment   Final    Percentage (Represents the % composition)    Ca Oxalate - Monohydrate, Stone 08/23/2023 30  % Final    Uric Acid, Stone 08/23/2023 70  % Final    Photo 08/23/2023 Comment   Final    Photograph will follow under a separate cover    Comments: 08/23/2023 Comment   Final    Physician questions regarding [facility] Analysis contact  [facility] at: [phone].    Please note 08/23/2023 Comment   Final    [facility] report will follow via computer, mail or   delivery.    Disclaimer: 08/23/2023 Comment   Final    This test was developed and its performance characteristics  determined by [facility].  It has not been cleared or approved  by the Food and Drug Administration.   Admission on 08/12/2023, Discharged on 08/12/2023   Component Date Value Ref Range Status    Glucose 08/12/2023 80  65 - 99 mg/dL Final    BUN 08/12/2023 18  8 - 23 mg/dL Final    Creatinine 08/12/2023 1.62 (H)  0.76 - 1.27 mg/dL Final    Sodium 08/12/2023 139  136 - 145 mmol/L Final    Potassium 08/12/2023 4.2  3.5 - 5.2 mmol/L Final    Slight hemolysis detected by analyzer. Results may be affected.    Chloride 08/12/2023 103  98 - 107 mmol/L Final    CO2 08/12/2023 25.2  22.0 - 29.0 mmol/L Final    Calcium 08/12/2023 9.3  8.6 - 10.5 mg/dL Final    Total Protein 08/12/2023 7.0  6.0 - 8.5 g/dL Final    Albumin 08/12/2023 4.2  3.5 - 5.2 g/dL Final    ALT (SGPT) 08/12/2023 10  1 - 41 U/L Final    AST (SGOT) 08/12/2023 18  1 - 40 U/L Final    Alkaline Phosphatase 08/12/2023 88  39 - 117 U/L Final    Total Bilirubin 08/12/2023 0.3  0.0 - 1.2 mg/dL Final    Globulin 08/12/2023 2.8  gm/dL Final    A/G Ratio 08/12/2023 1.5  g/dL Final    BUN/Creatinine Ratio 08/12/2023 11.1  7.0 - 25.0 Final    Anion Gap 08/12/2023 10.8  5.0 - 15.0 mmol/L Final    eGFR 08/12/2023 45.1 (L)  >60.0 mL/min/1.73 Final     Color, UA 08/12/2023 Yellow  Yellow, Straw Final    Appearance, UA 08/12/2023 Clear  Clear Final    pH, UA 08/12/2023 6.0  5.0 - 8.0 Final    Specific Gravity, UA 08/12/2023 1.015  1.005 - 1.030 Final    Glucose, UA 08/12/2023 Negative  Negative Final    Ketones, UA 08/12/2023 Trace (A)  Negative Final    Bilirubin, UA 08/12/2023 Negative  Negative Final    Blood, UA 08/12/2023 Moderate (2+) (A)  Negative Final    Protein, UA 08/12/2023 Trace (A)  Negative Final    Leuk Esterase, UA 08/12/2023 Small (1+) (A)  Negative Final    Nitrite, UA 08/12/2023 Negative  Negative Final    Urobilinogen, UA 08/12/2023 0.2 E.U./dL  0.2 - 1.0 E.U./dL Final    WBC 08/12/2023 9.78  3.40 - 10.80 10*3/mm3 Final    RBC 08/12/2023 5.85 (H)  4.14 - 5.80 10*6/mm3 Final    Hemoglobin 08/12/2023 15.7  13.0 - 17.7 g/dL Final    Hematocrit 08/12/2023 48.3  37.5 - 51.0 % Final    MCV 08/12/2023 82.6  79.0 - 97.0 fL Final    MCH 08/12/2023 26.8  26.6 - 33.0 pg Final    MCHC 08/12/2023 32.5  31.5 - 35.7 g/dL Final    RDW 08/12/2023 16.1 (H)  12.3 - 15.4 % Final    RDW-SD 08/12/2023 47.7  37.0 - 54.0 fl Final    MPV 08/12/2023 11.0  6.0 - 12.0 fL Final    Platelets 08/12/2023 232  140 - 450 10*3/mm3 Final    Neutrophil % 08/12/2023 73.3  42.7 - 76.0 % Final    Lymphocyte % 08/12/2023 18.0 (L)  19.6 - 45.3 % Final    Monocyte % 08/12/2023 5.3  5.0 - 12.0 % Final    Eosinophil % 08/12/2023 2.4  0.3 - 6.2 % Final    Basophil % 08/12/2023 0.7  0.0 - 1.5 % Final    Immature Grans % 08/12/2023 0.3  0.0 - 0.5 % Final    Neutrophils, Absolute 08/12/2023 7.17 (H)  1.70 - 7.00 10*3/mm3 Final    Lymphocytes, Absolute 08/12/2023 1.76  0.70 - 3.10 10*3/mm3 Final    Monocytes, Absolute 08/12/2023 0.52  0.10 - 0.90 10*3/mm3 Final    Eosinophils, Absolute 08/12/2023 0.23  0.00 - 0.40 10*3/mm3 Final    Basophils, Absolute 08/12/2023 0.07  0.00 - 0.20 10*3/mm3 Final    Immature Grans, Absolute 08/12/2023 0.03  0.00 - 0.05 10*3/mm3 Final    nRBC 08/12/2023 0.0  0.0  - 0.2 /100 WBC Final    Extra Tube 08/12/2023 Hold for add-ons.   Final    Auto resulted.    Extra Tube 08/12/2023 hold for add-on   Final    Auto resulted    Extra Tube 08/12/2023 Hold for add-ons.   Final    Auto resulted.    Extra Tube 08/12/2023 Hold for add-ons.   Final    Auto resulted    RBC, UA 08/12/2023 31-50 (A)  None Seen, 0-2 /HPF Final    WBC, UA 08/12/2023 0-2  None Seen, 0-2 /HPF Final    Bacteria, UA 08/12/2023 None Seen  None Seen /HPF Final    Squamous Epithelial Cells, UA 08/12/2023 0-2  None Seen, 0-2 /HPF Final    Hyaline Casts, UA 08/12/2023 None Seen  None Seen /LPF Final    Methodology 08/12/2023 Automated Microscopy   Final   Office Visit on 07/28/2023   Component Date Value Ref Range Status    Glucose 07/28/2023 137 (H)  65 - 99 mg/dL Final    BUN 07/28/2023 14  8 - 23 mg/dL Final    Creatinine 07/28/2023 1.29 (H)  0.76 - 1.27 mg/dL Final    Sodium 07/28/2023 139  136 - 145 mmol/L Final    Potassium 07/28/2023 5.7 (H)  3.5 - 5.2 mmol/L Final    Chloride 07/28/2023 104  98 - 107 mmol/L Final    CO2 07/28/2023 22.0  22.0 - 29.0 mmol/L Final    Calcium 07/28/2023 9.8  8.6 - 10.5 mg/dL Final    Total Protein 07/28/2023 7.1  6.0 - 8.5 g/dL Final    Albumin 07/28/2023 4.5  3.5 - 5.2 g/dL Final    ALT (SGPT) 07/28/2023 12  1 - 41 U/L Final    AST (SGOT) 07/28/2023 18  1 - 40 U/L Final    Alkaline Phosphatase 07/28/2023 82  39 - 117 U/L Final    Total Bilirubin 07/28/2023 0.4  0.0 - 1.2 mg/dL Final    Globulin 07/28/2023 2.6  gm/dL Final    A/G Ratio 07/28/2023 1.7  g/dL Final    BUN/Creatinine Ratio 07/28/2023 10.9  7.0 - 25.0 Final    Anion Gap 07/28/2023 13.0  5.0 - 15.0 mmol/L Final    eGFR 07/28/2023 59.3 (L)  >60.0 mL/min/1.73 Final   Admission on 07/07/2023, Discharged on 07/07/2023   Component Date Value Ref Range Status    Glucose 07/07/2023 112  70 - 130 mg/dL Final    Meter: [id number] : [id number] lavern farrar   Pre-Admission Testing on 07/05/2023   Component Date Value Ref  Range Status    WBC 07/05/2023 8.74  3.40 - 10.80 10*3/mm3 Final    RBC 07/05/2023 5.91 (H)  4.14 - 5.80 10*6/mm3 Final    Hemoglobin 07/05/2023 15.7  13.0 - 17.7 g/dL Final    Hematocrit 07/05/2023 48.5  37.5 - 51.0 % Final    MCV 07/05/2023 82.1  79.0 - 97.0 fL Final    MCH 07/05/2023 26.6  26.6 - 33.0 pg Final    MCHC 07/05/2023 32.4  31.5 - 35.7 g/dL Final    RDW 07/05/2023 14.9  12.3 - 15.4 % Final    RDW-SD 07/05/2023 45.0  37.0 - 54.0 fl Final    MPV 07/05/2023 11.3  6.0 - 12.0 fL Final    Platelets 07/05/2023 259  140 - 450 10*3/mm3 Final    Glucose 07/05/2023 65  65 - 99 mg/dL Final    BUN 07/05/2023 17  8 - 23 mg/dL Final    Creatinine 07/05/2023 1.54 (H)  0.76 - 1.27 mg/dL Final    Sodium 07/05/2023 135 (L)  136 - 145 mmol/L Final    Potassium 07/05/2023 4.7  3.5 - 5.2 mmol/L Final    Slight hemolysis detected by analyzer. Results may be affected.    Chloride 07/05/2023 104  98 - 107 mmol/L Final    CO2 07/05/2023 21.5 (L)  22.0 - 29.0 mmol/L Final    Calcium 07/05/2023 9.2  8.6 - 10.5 mg/dL Final    BUN/Creatinine Ratio 07/05/2023 11.0  7.0 - 25.0 Final    Anion Gap 07/05/2023 9.5  5.0 - 15.0 mmol/L Final    eGFR 07/05/2023 47.9 (L)  >60.0 mL/min/1.73 Final    QT Interval 07/05/2023 402  ms Final    QTC Interval 07/05/2023 402  ms Final        Procedure: None  Procedures     I have reviewed and agree with the above PMH, PSH, FMH, social history, medications, allergies, and labs.     Assessment/Plan:   Problem List Items Addressed This Visit          Genitourinary and Reproductive     Left renal stone - Primary    Overview     Added automatically from request for surgery [id number]         Relevant Orders    Case Request (Completed)       Health Maintenance:   Health Maintenance Due   Topic Date Due    COLORECTAL CANCER SCREENING  Never done    Pneumococcal Vaccine 65+ (1 - PCV) Never done    HEPATITIS C SCREENING  Never done    ANNUAL WELLNESS VISIT  Never done    LIPID PANEL  05/03/2019    COVID-19 Vaccine  (6 - Moderna series) 03/22/2023        Smoking Counseling: Former smoker.  Never used smokeless tobacco.  Counseling given.    Urine Incontinence: Patient reports that he is not currently experiencing any symptoms of urinary incontinence.    Patient was given instructions and counseling regarding his condition or for health maintenance advice. Please see specific information pulled into the AVS if appropriate.    Patient Education:   Renal calculus - It was discussed with the patient the presence of a stone 1.2 cm left intrarenal stone. We discussed the various therapeutic options available including percutaneous nephrostolithotomy, ureteroscopy and extracorporeal shockwave  lithotripsy.  We discussed the risks of lithotripsy including the passage of stones leading to a 3% chance of Steinstrasse or a large string of stones in the distal ureter. In this incidence the patient was informed that a ureteroscopy is indicated for obstructing fragments.  Patient was informed of an extremely rare incidence of renal hematoma and the significance of this.  Patient was educated on percutaneous nephrostolithotomy and its use as well as the risks and benefits such as the need for postoperative hospitalization, and the risk of damage to the kidney and the remote risk of a nephrectomy.  We also discussed the use of ureteroscopy in the upper tracts and its decreased success rate to completely remove the stones likely causing stent placement leading to an additional procedure for removal.  We discussed the absolute relative indicators for intervention including the presence of sepsis and uncontrollable pain leading to need for urgent intervention.  We discussed placement of a stent if indicated and the management of the stent as well.  We will schedule the patient for a left ESWL on October 6, 2023.  Advised patient to limit any factors that contribute to stone formation such as red meats, soda, or tea.  Patient verbalized  understanding.  Advised to report to our clinic sooner if needed.    Visit Diagnoses:    ICD-10-CM ICD-9-CM   1. Left renal stone  N20.0 592.0       Meds Ordered During Visit:  No orders of the defined types were placed in this encounter.      Follow Up Appointment: Left ESWL on October 6, 2023.  No follow-ups on file.      This document has been electronically signed by Dominick Porter PA-C   September 19, 2023 09:48 EDT    Part of this note may be an electronic transcription/translation of spoken language to printed text using the Dragon Dictation System.

## 2023-09-19 NOTE — PROGRESS NOTES
"Chief Complaint:    Chief Complaint   Patient presents with    right ureteral stone        Vital Signs:   BP (!) 186/77 (BP Location: Left arm, Patient Position: Sitting, Cuff Size: Adult)   Pulse 57   Ht 175.3 cm (69.02\")   Wt 93.3 kg (205 lb 9.6 oz)   BMI 30.35 kg/m²   Body mass index is 30.35 kg/m².      HPI:  eY Butler is a 73 y.o. male who presents today for follow up    History of Present Illness  Mr. Butler presents to the clinic for follow-up for nephrolithiasis.  Patient was recently scheduled to undergo a uteroscopy with laser lithotripsy by Dr. Melendrez for a distal 6 mm right ureteral calculus however day before surgery he passed the stone voluntarily.  His stone was sent off for analysis and was found to be 70% uric acid and 30% calcium oxalate monohydrate.  Patient states that since passing the stone he has had minimal problems.  He does report some left-sided intermittent back and flank pain from his previous 1.2 cm lower pole left intrarenal stone.  He denies any gross hematuria, frequency, urgency, dysuria, difficulty urinating, or fever/chills.  He gets up roughly 1 time throughout the night.  He is desirous to undergo surgery for stone removal of his left intrarenal stone.  We will schedule him for October 6.    Past Medical History:  Past Medical History:   Diagnosis Date    Appendicitis     Arthritis     COPD (chronic obstructive pulmonary disease)     Coronary artery disease     COVID-19 2021    Diabetes mellitus     Disease of thyroid gland     Diverticulitis     Dizzy     Elevated cholesterol     Emphysema lung     Emphysema lung     Emphysema lung     GERD (gastroesophageal reflux disease)     Hiatal hernia     Hyperlipidemia     Hypertension     Kidney stone     MI (myocardial infarction)     PONV (postoperative nausea and vomiting)     Stomach ulcer     Stroke 2018       Current Meds:  Current Outpatient Medications   Medication Sig Dispense Refill    amLODIPine (NORVASC) 5 MG " tablet Take 1 tablet by mouth Daily.      aspirin 81 MG tablet Take 1 tablet by mouth Daily. 30 tablet 11    Docusate Sodium 100 MG capsule Take 1 tablet by mouth As Needed for Constipation.      glimepiride (AMARYL) 4 MG tablet Take 1 tablet by mouth Daily.      HYDROcodone-acetaminophen (NORCO)  MG per tablet 10 tablets.      ketorolac (TORADOL) 10 MG tablet Take 1 tablet by mouth Every 6 (Six) Hours As Needed for Moderate Pain. 16 tablet 2    levothyroxine (SYNTHROID, LEVOTHROID) 50 MCG tablet Take 1 tablet by mouth Daily.  0    Linzess 145 MCG capsule capsule       lisinopril (PRINIVIL,ZESTRIL) 20 MG tablet Take 1 tablet by mouth 2 (Two) Times a Day. 60 tablet 5    memantine (NAMENDA) 5 MG tablet Take 1 tablet by mouth Daily.      metFORMIN (GLUCOPHAGE) 500 MG tablet Take 1 tablet by mouth 2 (Two) Times a Day With Meals.  0    metoprolol tartrate (LOPRESSOR) 25 MG tablet       montelukast (SINGULAIR) 10 MG tablet Take 1 tablet by mouth Every Night.      nitroglycerin (NITROSTAT) 0.4 MG SL tablet 1 under the tongue as needed for angina, may repeat q5mins for up three doses 100 tablet 11    NON FORMULARY Morphine pump      ondansetron ODT (ZOFRAN-ODT) 4 MG disintegrating tablet Place 1 tablet on the tongue Every 6 (Six) Hours As Needed for Nausea or Vomiting. 10 tablet 0    pantoprazole (PROTONIX) 40 MG EC tablet       pravastatin (PRAVACHOL) 40 MG tablet       sertraline (ZOLOFT) 25 MG tablet Take 1 tablet by mouth Daily.      tamsulosin (FLOMAX) 0.4 MG capsule 24 hr capsule 1 capsule Daily.      Testosterone Cypionate (DEPOTESTOTERONE CYPIONATE) 200 MG/ML injection Inject 1 mL into the appropriate muscle as directed by prescriber Every 21 (Twenty-One) Days.      traZODone (DESYREL) 50 MG tablet       Zolpidem Tartrate 5 MG/ACT solution Take  by mouth.       No current facility-administered medications for this visit.        Allergies:   Allergies   Allergen Reactions    Claritin [Loratadine] Other (See  Comments)     Possible kidney issues    Codeine Other (See Comments)     Kidney issues         Past Surgical History:  Past Surgical History:   Procedure Laterality Date    ABDOMINAL SURGERY      APPENDECTOMY      CARDIAC CATHETERIZATION      COLONOSCOPY      CORONARY STENT PLACEMENT      ENDOSCOPY      EXTRACORPOREAL SHOCK WAVE LITHOTRIPSY (ESWL) Right 07/23/2021    Procedure: EXTRACORPOREAL SHOCKWAVE LITHOTRIPSY;  Surgeon: Nabeel Melendrez MD;  Location: [facility];  Service: Urology;  Laterality: Right;    EXTRACORPOREAL SHOCK WAVE LITHOTRIPSY (ESWL) Right 07/07/2023    Procedure: EXTRACORPOREAL SHOCKWAVE LITHOTRIPSY;  Surgeon: Nabeel Melendrez MD;  Location: [facility];  Service: Urology;  Laterality: Right;    PAIN PUMP TRIAL      TURP / TRANSURETHRAL INCISION / DRAINAGE PROSTATE         Social History:  Social History     Socioeconomic History    Marital status:    Tobacco Use    Smoking status: Former     Years: 20.00     Types: Cigarettes    Smokeless tobacco: Never    Tobacco comments:     quit 20+ yrs ago    Vaping Use    Vaping Use: Never used   Substance and Sexual Activity    Alcohol use: No     Comment: former use 20 + yrs ago     Drug use: No    Sexual activity: Defer       Family History:  Family History   Problem Relation Age of Onset    Cancer Mother     Heart disease Father     Stroke Father        Review of Systems:  Review of Systems   Constitutional:  Positive for fatigue. Negative for chills, fever and unexpected weight change.   HENT:  Negative for congestion and sinus pressure.    Respiratory:  Negative for chest tightness and shortness of breath.    Cardiovascular:  Negative for chest pain.   Gastrointestinal:  Negative for abdominal pain, constipation, diarrhea, nausea and vomiting.   Genitourinary:  Negative for difficulty urinating, dysuria, flank pain, frequency, hematuria and urgency.   Musculoskeletal:  Positive for back pain. Negative for neck pain.   Skin:   Negative for rash.   Neurological:  Negative for dizziness and headaches.   Hematological:  Bruises/bleeds easily.   Psychiatric/Behavioral:  Negative for confusion and suicidal ideas. The patient is not nervous/anxious.      Physical Exam:  Physical Exam  Constitutional:       General: He is not in acute distress.     Appearance: Normal appearance.   HENT:      Head: Normocephalic and atraumatic.      Nose: Nose normal.      Mouth/Throat:      Mouth: Mucous membranes are moist.   Eyes:      Conjunctiva/sclera: Conjunctivae normal.   Cardiovascular:      Rate and Rhythm: Normal rate and regular rhythm.      Pulses: Normal pulses.      Heart sounds: Normal heart sounds.   Pulmonary:      Effort: Pulmonary effort is normal.      Breath sounds: Normal breath sounds.   Abdominal:      General: Bowel sounds are normal.      Palpations: Abdomen is soft.      Tenderness: There is no right CVA tenderness or left CVA tenderness.   Musculoskeletal:         General: Normal range of motion.      Cervical back: Normal range of motion.   Skin:     General: Skin is warm.   Neurological:      General: No focal deficit present.      Mental Status: He is alert and oriented to person, place, and time.   Psychiatric:         Mood and Affect: Mood normal.         Behavior: Behavior normal.         Thought Content: Thought content normal.         Judgment: Judgment normal.       Recent Image (CT and/or KUB):   CT Abdomen and Pelvis: No results found for this or any previous visit.     CT Stone Protocol: Results for orders placed during the hospital encounter of 08/12/23    CT Abdomen Pelvis Stone Protocol    Narrative  Comparison: April 19, 2023.    Lung bases are clear. Slight nodular contour the liver, question  underlying cirrhosis. The gallbladder, pancreas, spleen and adrenal  glands are unremarkable. Nonobstructive left renal stones with the  dominant stone measuring measures 1.2 cm. Stones distal right ureter  with the dominant  stone at the distal right ureter measures 6 mm. More  proximal stones measuring 1-2 mm, findings causing moderate hydroureter  and hydronephrosis with perinephric stranding. Mild right renal scarring  with renal atrophy. No obstructive uropathy seen on the left. Small  fat-containing left inguinal hernia. Diverticulosis is noted without  diverticulitis. No bowel obstruction, free air or significant free fluid  seen. The appendix is not seen. Multilevel degenerative changes  thoracolumbar spine.    Impression  Impression:  1. Distal right ureter stone causing moderate obstructive uropathy.    This report was finalized on 8/12/2023 4:14 PM by Phil Lazar DO.     KUB: Results for orders placed during the hospital encounter of 07/28/23    XR Abdomen KUB    Narrative  EXAM:  XR Abdomen, 1 View    EXAM DATE:  7/28/2023 10:53 AM    CLINICAL HISTORY:  flank pain; N20.0-Calculus of kidney    TECHNIQUE:  Frontal supine view of the abdomen/pelvis.    COMPARISON:  06/14/2023    FINDINGS:  Gastrointestinal tract:  Unremarkable.  No dilation.  Organs:  Small nonobstructing right kidney stone.  14.6 mm stone in  the region of the lower pole left kidney.  Bones/joints:  Unremarkable.  Soft tissues:  Surgical clips lower pelvis.  Other findings:  Prostate calcifications noted.    Impression  1.  Small nonobstructing right kidney stone.  2.  14.6 mm stone in the region of the lower pole left kidney.    This report was finalized on 7/28/2023 11:04 AM by Dr. Magdy Daley MD.       Labs:  Brief Urine Lab Results  (Last result in the past 365 days)        Color   Clarity   Blood   Leuk Est   Nitrite   Protein   CREAT   Urine HCG        08/12/23 1630 Yellow   Clear   Moderate (2+)   Small (1+)   Negative   Trace                 Admission on 08/23/2023, Discharged on 08/23/2023   Component Date Value Ref Range Status    Stone Source 08/23/2023 Comment   Final    Not provided    Color 08/23/2023 Springer   Final    Size 08/23/2023 7x10   mm Final    Multiple pieces received.  Dimensions of the largest piece  reported.    Stone Weight 08/23/2023 198  mg Final    Composition 08/23/2023 Comment   Final    Percentage (Represents the % composition)    Ca Oxalate - Monohydrate, Stone 08/23/2023 30  % Final    Uric Acid, Stone 08/23/2023 70  % Final    Photo 08/23/2023 Comment   Final    Photograph will follow under a separate cover    Comments: 08/23/2023 Comment   Final    Physician questions regarding Calculi Analysis contact  [facility] at: [phone].    Please note 08/23/2023 Comment   Final    Calculi report will follow via computer, mail or   delivery.    Disclaimer: 08/23/2023 Comment   Final    This test was developed and its performance characteristics  determined by [employer].  It has not been cleared or approved  by the Food and Drug Administration.   Admission on 08/12/2023, Discharged on 08/12/2023   Component Date Value Ref Range Status    Glucose 08/12/2023 80  65 - 99 mg/dL Final    BUN 08/12/2023 18  8 - 23 mg/dL Final    Creatinine 08/12/2023 1.62 (H)  0.76 - 1.27 mg/dL Final    Sodium 08/12/2023 139  136 - 145 mmol/L Final    Potassium 08/12/2023 4.2  3.5 - 5.2 mmol/L Final    Slight hemolysis detected by analyzer. Results may be affected.    Chloride 08/12/2023 103  98 - 107 mmol/L Final    CO2 08/12/2023 25.2  22.0 - 29.0 mmol/L Final    Calcium 08/12/2023 9.3  8.6 - 10.5 mg/dL Final    Total Protein 08/12/2023 7.0  6.0 - 8.5 g/dL Final    Albumin 08/12/2023 4.2  3.5 - 5.2 g/dL Final    ALT (SGPT) 08/12/2023 10  1 - 41 U/L Final    AST (SGOT) 08/12/2023 18  1 - 40 U/L Final    Alkaline Phosphatase 08/12/2023 88  39 - 117 U/L Final    Total Bilirubin 08/12/2023 0.3  0.0 - 1.2 mg/dL Final    Globulin 08/12/2023 2.8  gm/dL Final    A/G Ratio 08/12/2023 1.5  g/dL Final    BUN/Creatinine Ratio 08/12/2023 11.1  7.0 - 25.0 Final    Anion Gap 08/12/2023 10.8  5.0 - 15.0 mmol/L Final    eGFR 08/12/2023 45.1 (L)  >60.0 mL/min/1.73 Final     Color, UA 08/12/2023 Yellow  Yellow, Straw Final    Appearance, UA 08/12/2023 Clear  Clear Final    pH, UA 08/12/2023 6.0  5.0 - 8.0 Final    Specific Gravity, UA 08/12/2023 1.015  1.005 - 1.030 Final    Glucose, UA 08/12/2023 Negative  Negative Final    Ketones, UA 08/12/2023 Trace (A)  Negative Final    Bilirubin, UA 08/12/2023 Negative  Negative Final    Blood, UA 08/12/2023 Moderate (2+) (A)  Negative Final    Protein, UA 08/12/2023 Trace (A)  Negative Final    Leuk Esterase, UA 08/12/2023 Small (1+) (A)  Negative Final    Nitrite, UA 08/12/2023 Negative  Negative Final    Urobilinogen, UA 08/12/2023 0.2 E.U./dL  0.2 - 1.0 E.U./dL Final    WBC 08/12/2023 9.78  3.40 - 10.80 10*3/mm3 Final    RBC 08/12/2023 5.85 (H)  4.14 - 5.80 10*6/mm3 Final    Hemoglobin 08/12/2023 15.7  13.0 - 17.7 g/dL Final    Hematocrit 08/12/2023 48.3  37.5 - 51.0 % Final    MCV 08/12/2023 82.6  79.0 - 97.0 fL Final    MCH 08/12/2023 26.8  26.6 - 33.0 pg Final    MCHC 08/12/2023 32.5  31.5 - 35.7 g/dL Final    RDW 08/12/2023 16.1 (H)  12.3 - 15.4 % Final    RDW-SD 08/12/2023 47.7  37.0 - 54.0 fl Final    MPV 08/12/2023 11.0  6.0 - 12.0 fL Final    Platelets 08/12/2023 232  140 - 450 10*3/mm3 Final    Neutrophil % 08/12/2023 73.3  42.7 - 76.0 % Final    Lymphocyte % 08/12/2023 18.0 (L)  19.6 - 45.3 % Final    Monocyte % 08/12/2023 5.3  5.0 - 12.0 % Final    Eosinophil % 08/12/2023 2.4  0.3 - 6.2 % Final    Basophil % 08/12/2023 0.7  0.0 - 1.5 % Final    Immature Grans % 08/12/2023 0.3  0.0 - 0.5 % Final    Neutrophils, Absolute 08/12/2023 7.17 (H)  1.70 - 7.00 10*3/mm3 Final    Lymphocytes, Absolute 08/12/2023 1.76  0.70 - 3.10 10*3/mm3 Final    Monocytes, Absolute 08/12/2023 0.52  0.10 - 0.90 10*3/mm3 Final    Eosinophils, Absolute 08/12/2023 0.23  0.00 - 0.40 10*3/mm3 Final    Basophils, Absolute 08/12/2023 0.07  0.00 - 0.20 10*3/mm3 Final    Immature Grans, Absolute 08/12/2023 0.03  0.00 - 0.05 10*3/mm3 Final    nRBC 08/12/2023 0.0  0.0  - 0.2 /100 WBC Final    Extra Tube 08/12/2023 Hold for add-ons.   Final    Auto resulted.    Extra Tube 08/12/2023 hold for add-on   Final    Auto resulted    Extra Tube 08/12/2023 Hold for add-ons.   Final    Auto resulted.    Extra Tube 08/12/2023 Hold for add-ons.   Final    Auto resulted    RBC, UA 08/12/2023 31-50 (A)  None Seen, 0-2 /HPF Final    WBC, UA 08/12/2023 0-2  None Seen, 0-2 /HPF Final    Bacteria, UA 08/12/2023 None Seen  None Seen /HPF Final    Squamous Epithelial Cells, UA 08/12/2023 0-2  None Seen, 0-2 /HPF Final    Hyaline Casts, UA 08/12/2023 None Seen  None Seen /LPF Final    Methodology 08/12/2023 Automated Microscopy   Final   Office Visit on 07/28/2023   Component Date Value Ref Range Status    Glucose 07/28/2023 137 (H)  65 - 99 mg/dL Final    BUN 07/28/2023 14  8 - 23 mg/dL Final    Creatinine 07/28/2023 1.29 (H)  0.76 - 1.27 mg/dL Final    Sodium 07/28/2023 139  136 - 145 mmol/L Final    Potassium 07/28/2023 5.7 (H)  3.5 - 5.2 mmol/L Final    Chloride 07/28/2023 104  98 - 107 mmol/L Final    CO2 07/28/2023 22.0  22.0 - 29.0 mmol/L Final    Calcium 07/28/2023 9.8  8.6 - 10.5 mg/dL Final    Total Protein 07/28/2023 7.1  6.0 - 8.5 g/dL Final    Albumin 07/28/2023 4.5  3.5 - 5.2 g/dL Final    ALT (SGPT) 07/28/2023 12  1 - 41 U/L Final    AST (SGOT) 07/28/2023 18  1 - 40 U/L Final    Alkaline Phosphatase 07/28/2023 82  39 - 117 U/L Final    Total Bilirubin 07/28/2023 0.4  0.0 - 1.2 mg/dL Final    Globulin 07/28/2023 2.6  gm/dL Final    A/G Ratio 07/28/2023 1.7  g/dL Final    BUN/Creatinine Ratio 07/28/2023 10.9  7.0 - 25.0 Final    Anion Gap 07/28/2023 13.0  5.0 - 15.0 mmol/L Final    eGFR 07/28/2023 59.3 (L)  >60.0 mL/min/1.73 Final   Admission on 07/07/2023, Discharged on 07/07/2023   Component Date Value Ref Range Status    Glucose 07/07/2023 112  70 - 130 mg/dL Final    Meter: [id number] : [id number] lavern farrar   Pre-Admission Testing on 07/05/2023   Component Date Value Ref  Range Status    WBC 07/05/2023 8.74  3.40 - 10.80 10*3/mm3 Final    RBC 07/05/2023 5.91 (H)  4.14 - 5.80 10*6/mm3 Final    Hemoglobin 07/05/2023 15.7  13.0 - 17.7 g/dL Final    Hematocrit 07/05/2023 48.5  37.5 - 51.0 % Final    MCV 07/05/2023 82.1  79.0 - 97.0 fL Final    MCH 07/05/2023 26.6  26.6 - 33.0 pg Final    MCHC 07/05/2023 32.4  31.5 - 35.7 g/dL Final    RDW 07/05/2023 14.9  12.3 - 15.4 % Final    RDW-SD 07/05/2023 45.0  37.0 - 54.0 fl Final    MPV 07/05/2023 11.3  6.0 - 12.0 fL Final    Platelets 07/05/2023 259  140 - 450 10*3/mm3 Final    Glucose 07/05/2023 65  65 - 99 mg/dL Final    BUN 07/05/2023 17  8 - 23 mg/dL Final    Creatinine 07/05/2023 1.54 (H)  0.76 - 1.27 mg/dL Final    Sodium 07/05/2023 135 (L)  136 - 145 mmol/L Final    Potassium 07/05/2023 4.7  3.5 - 5.2 mmol/L Final    Slight hemolysis detected by analyzer. Results may be affected.    Chloride 07/05/2023 104  98 - 107 mmol/L Final    CO2 07/05/2023 21.5 (L)  22.0 - 29.0 mmol/L Final    Calcium 07/05/2023 9.2  8.6 - 10.5 mg/dL Final    BUN/Creatinine Ratio 07/05/2023 11.0  7.0 - 25.0 Final    Anion Gap 07/05/2023 9.5  5.0 - 15.0 mmol/L Final    eGFR 07/05/2023 47.9 (L)  >60.0 mL/min/1.73 Final    QT Interval 07/05/2023 402  ms Final    QTC Interval 07/05/2023 402  ms Final        Procedure: None  Procedures     I have reviewed and agree with the above PMH, PSH, FMH, social history, medications, allergies, and labs.     Assessment/Plan:   Problem List Items Addressed This Visit          Genitourinary and Reproductive     Left renal stone - Primary    Overview     Added automatically from request for surgery [id number]         Relevant Orders    Case Request (Completed)       Health Maintenance:   Health Maintenance Due   Topic Date Due    COLORECTAL CANCER SCREENING  Never done    Pneumococcal Vaccine 65+ (1 - PCV) Never done    HEPATITIS C SCREENING  Never done    ANNUAL WELLNESS VISIT  Never done    LIPID PANEL  05/03/2019    COVID-19 Vaccine  (6 - Moderna series) 03/22/2023        Smoking Counseling: Former smoker.  Never used smokeless tobacco.  Counseling given.    Urine Incontinence: Patient reports that he is not currently experiencing any symptoms of urinary incontinence.    Patient was given instructions and counseling regarding his condition or for health maintenance advice. Please see specific information pulled into the AVS if appropriate.    Patient Education:   Renal calculus - It was discussed with the patient the presence of a stone 1.2 cm left intrarenal stone. We discussed the various therapeutic options available including percutaneous nephrostolithotomy, ureteroscopy and extracorporeal shockwave  lithotripsy.  We discussed the risks of lithotripsy including the passage of stones leading to a 3% chance of Steinstrasse or a large string of stones in the distal ureter. In this incidence the patient was informed that a ureteroscopy is indicated for obstructing fragments.  Patient was informed of an extremely rare incidence of renal hematoma and the significance of this.  Patient was educated on percutaneous nephrostolithotomy and its use as well as the risks and benefits such as the need for postoperative hospitalization, and the risk of damage to the kidney and the remote risk of a nephrectomy.  We also discussed the use of ureteroscopy in the upper tracts and its decreased success rate to completely remove the stones likely causing stent placement leading to an additional procedure for removal.  We discussed the absolute relative indicators for intervention including the presence of sepsis and uncontrollable pain leading to need for urgent intervention.  We discussed placement of a stent if indicated and the management of the stent as well.  We will schedule the patient for a left ESWL on October 6, 2023.  Advised patient to limit any factors that contribute to stone formation such as red meats, soda, or tea.  Patient verbalized  understanding.  Advised to report to our clinic sooner if needed.    Visit Diagnoses:    ICD-10-CM ICD-9-CM   1. Left renal stone  N20.0 592.0       Meds Ordered During Visit:  No orders of the defined types were placed in this encounter.      Follow Up Appointment: Left ESWL on October 6, 2023.  No follow-ups on file.      This document has been electronically signed by Dominick Porter PA-C   September 19, 2023 09:48 EDT    Part of this note may be an electronic transcription/translation of spoken language to printed text using the Dragon Dictation System.

## 2023-09-29 NOTE — DISCHARGE INSTRUCTIONS
TAKE the following medications the morning of surgery:      Please discontinue all blood thinners and anticoagulants (except aspirin) prior to surgery as per your surgeon and cardiologist instructions.  Aspirin may be continued up to the day prior to surgery.    HOLD all diabetic medications the morning of surgery as order by physician.    Please follow instructions on use of prep cloths provided by nurse. Return instruction sheet to pre-op nurse on day of surgery.    Arrival time for surgery on 10/06/23   will be given to you by Dr. Artis.    A RESPONSIBLE PERSON MUST REMAIN IN THE WAITING ROOM DURING YOUR PROCEDURE AND A RESPONSIBLE  MUST BE AVAILABLE UPON YOUR DISCHARGE.    General Instructions:  Do NOT eat or drink after midnight which includes water, mints, or gum.  You may brush your teeth. Dental appliances that are removable must be taken out day of surgery.  Do NOT smoke, chew tobacco, or drink alcohol within 24 hours prior to surgery.  Bring medications in original bottles, any inhalers and if applicable your C-PAP/BI-PAP machine  Bring any papers given to you in the doctor’s office  Wear clean, comfortable clothes and socks  Do NOT wear contact lenses or make-up or dark nail polish.  Bring a case for your glasses if applicable.  Bring crutches or walker if applicable  Leave all other valuables and jewelry at home  If you were given a blood bank armband, continue to wear it until discharged.    Preventing a Surgical Site Infection:  Shower the night before surgery (unless instructed otherwise) using a fresh bar of anti-bacterial soap (such as Dial) and clean washcloth.  Dry with a clean towel and dress in clean clothing.  For 2 to 3 days before surgery, avoid shaving with a razor near where you will have surgery because the razor can irritate skin and make it easier to develop an infection.  Ask your surgeon if you will be receiving antibiotics prior to surgery.  Make sure you, your family, and  all healthcare providers clean their hands with soap and water or an alcohol-based hand  before caring for you or your wound.  If at all possible, quit smoking as many days before surgery as you can.    Day of Surgery:  Upon arrival, a pre-op nurse and anesthesiologist will review your health history, obtain vital signs, and answer questions you may have.  The only belongings needed at this time will be your home medications and if applicable you C-PAP/BI-PAP machine.  If you are staying overnight, your family can leave the rest of your belongings in the car and bring them to your room later.  A pre-op nurse will start an IV and you may receive medication in preparation for surgery.  Due to patient privacy and limited space, only one member of your family will be able to accompany you in the pre-op area.  While you are in surgery your family should notify the waiting room  if they leave the waiting room area and provide a contact number.  Please be aware that surgery does come with discomfort.  We want to make every effort to control your discomfort so please discuss any uncontrolled symptoms with your nurse.  Your doctor will most likely have prescribed pain medications.  If you are going home after surgery you will receive individualized written care instructions before being discharged.  A responsible adult must drive you to and from the hospital on the day of surgery and stay with you for 24 hours.  If you are staying overnight following surgery, you will be transported to your hospital room following the recovery period.

## 2023-10-02 ENCOUNTER — PRE-ADMISSION TESTING (OUTPATIENT)
Dept: PREADMISSION TESTING | Facility: HOSPITAL | Age: 73
End: 2023-10-02
Payer: MEDICARE

## 2023-10-02 LAB
ANION GAP SERPL CALCULATED.3IONS-SCNC: 13.9 MMOL/L (ref 5–15)
BASOPHILS # BLD AUTO: 0.05 10*3/MM3 (ref 0–0.2)
BASOPHILS NFR BLD AUTO: 0.6 % (ref 0–1.5)
BUN SERPL-MCNC: 13 MG/DL (ref 8–23)
BUN/CREAT SERPL: 10 (ref 7–25)
CALCIUM SPEC-SCNC: 8.6 MG/DL (ref 8.6–10.5)
CHLORIDE SERPL-SCNC: 105 MMOL/L (ref 98–107)
CO2 SERPL-SCNC: 21.1 MMOL/L (ref 22–29)
CREAT SERPL-MCNC: 1.3 MG/DL (ref 0.76–1.27)
DEPRECATED RDW RBC AUTO: 49.4 FL (ref 37–54)
EGFRCR SERPLBLD CKD-EPI 2021: 58.7 ML/MIN/1.73
EOSINOPHIL # BLD AUTO: 0.13 10*3/MM3 (ref 0–0.4)
EOSINOPHIL NFR BLD AUTO: 1.6 % (ref 0.3–6.2)
ERYTHROCYTE [DISTWIDTH] IN BLOOD BY AUTOMATED COUNT: 16.4 % (ref 12.3–15.4)
GLUCOSE SERPL-MCNC: 153 MG/DL (ref 65–99)
HCT VFR BLD AUTO: 48.5 % (ref 37.5–51)
HGB BLD-MCNC: 16.2 G/DL (ref 13–17.7)
IMM GRANULOCYTES # BLD AUTO: 0.03 10*3/MM3 (ref 0–0.05)
IMM GRANULOCYTES NFR BLD AUTO: 0.4 % (ref 0–0.5)
LYMPHOCYTES # BLD AUTO: 1.72 10*3/MM3 (ref 0.7–3.1)
LYMPHOCYTES NFR BLD AUTO: 21.6 % (ref 19.6–45.3)
MCH RBC QN AUTO: 27.9 PG (ref 26.6–33)
MCHC RBC AUTO-ENTMCNC: 33.4 G/DL (ref 31.5–35.7)
MCV RBC AUTO: 83.5 FL (ref 79–97)
MONOCYTES # BLD AUTO: 0.58 10*3/MM3 (ref 0.1–0.9)
MONOCYTES NFR BLD AUTO: 7.3 % (ref 5–12)
NEUTROPHILS NFR BLD AUTO: 5.47 10*3/MM3 (ref 1.7–7)
NEUTROPHILS NFR BLD AUTO: 68.5 % (ref 42.7–76)
NRBC BLD AUTO-RTO: 0 /100 WBC (ref 0–0.2)
PLATELET # BLD AUTO: 238 10*3/MM3 (ref 140–450)
PMV BLD AUTO: 13 FL (ref 6–12)
POTASSIUM SERPL-SCNC: 4.3 MMOL/L (ref 3.5–5.2)
RBC # BLD AUTO: 5.81 10*6/MM3 (ref 4.14–5.8)
SODIUM SERPL-SCNC: 140 MMOL/L (ref 136–145)
WBC NRBC COR # BLD: 7.98 10*3/MM3 (ref 3.4–10.8)

## 2023-10-02 PROCEDURE — 85025 COMPLETE CBC W/AUTO DIFF WBC: CPT

## 2023-10-02 PROCEDURE — 80048 BASIC METABOLIC PNL TOTAL CA: CPT

## 2023-10-06 ENCOUNTER — ANESTHESIA (OUTPATIENT)
Dept: PERIOP | Facility: HOSPITAL | Age: 73
End: 2023-10-06
Payer: MEDICARE

## 2023-10-06 ENCOUNTER — ANESTHESIA EVENT (OUTPATIENT)
Dept: PERIOP | Facility: HOSPITAL | Age: 73
End: 2023-10-06
Payer: MEDICARE

## 2023-10-06 ENCOUNTER — HOSPITAL ENCOUNTER (OUTPATIENT)
Facility: HOSPITAL | Age: 73
Setting detail: HOSPITAL OUTPATIENT SURGERY
Discharge: HOME | End: 2023-10-06
Attending: UROLOGY | Admitting: UROLOGY
Payer: MEDICARE

## 2023-10-06 VITALS
SYSTOLIC BLOOD PRESSURE: 158 MMHG | HEIGHT: 69 IN | WEIGHT: 203 LBS | OXYGEN SATURATION: 98 % | TEMPERATURE: 98 F | HEART RATE: 67 BPM | DIASTOLIC BLOOD PRESSURE: 86 MMHG | BODY MASS INDEX: 30.07 KG/M2 | RESPIRATION RATE: 18 BRPM

## 2023-10-06 DIAGNOSIS — N20.0 LEFT RENAL STONE: ICD-10-CM

## 2023-10-06 LAB — GLUCOSE BLDC GLUCOMTR-MCNC: 112 MG/DL (ref 70–130)

## 2023-10-06 PROCEDURE — 25010000002 GENTAMICIN PER 80 MG

## 2023-10-06 PROCEDURE — 25810000003 LACTATED RINGERS PER 1000 ML: Performed by: ANESTHESIOLOGY

## 2023-10-06 PROCEDURE — 25010000002 ONDANSETRON PER 1 MG: Performed by: NURSE ANESTHETIST, CERTIFIED REGISTERED

## 2023-10-06 PROCEDURE — 50590 FRAGMENTING OF KIDNEY STONE: CPT | Performed by: UROLOGY

## 2023-10-06 PROCEDURE — 25010000002 PROPOFOL 200 MG/20ML EMULSION: Performed by: NURSE ANESTHETIST, CERTIFIED REGISTERED

## 2023-10-06 PROCEDURE — 25810000003 LACTATED RINGERS PER 1000 ML: Performed by: NURSE ANESTHETIST, CERTIFIED REGISTERED

## 2023-10-06 PROCEDURE — 82948 REAGENT STRIP/BLOOD GLUCOSE: CPT

## 2023-10-06 PROCEDURE — 25010000002 FENTANYL CITRATE (PF) 50 MCG/ML SOLUTION: Performed by: NURSE ANESTHETIST, CERTIFIED REGISTERED

## 2023-10-06 RX ORDER — SODIUM CHLORIDE 0.9 % (FLUSH) 0.9 %
10 SYRINGE (ML) INJECTION EVERY 12 HOURS SCHEDULED
Status: DISCONTINUED | OUTPATIENT
Start: 2023-10-06 | End: 2023-10-06 | Stop reason: HOSPADM

## 2023-10-06 RX ORDER — PROPOFOL 10 MG/ML
INJECTION, EMULSION INTRAVENOUS AS NEEDED
Status: DISCONTINUED | OUTPATIENT
Start: 2023-10-06 | End: 2023-10-06 | Stop reason: SURG

## 2023-10-06 RX ORDER — FAMOTIDINE 10 MG/ML
INJECTION, SOLUTION INTRAVENOUS AS NEEDED
Status: DISCONTINUED | OUTPATIENT
Start: 2023-10-06 | End: 2023-10-06 | Stop reason: SURG

## 2023-10-06 RX ORDER — IPRATROPIUM BROMIDE AND ALBUTEROL SULFATE 2.5; .5 MG/3ML; MG/3ML
3 SOLUTION RESPIRATORY (INHALATION) ONCE AS NEEDED
Status: DISCONTINUED | OUTPATIENT
Start: 2023-10-06 | End: 2023-10-06 | Stop reason: HOSPADM

## 2023-10-06 RX ORDER — ONDANSETRON 2 MG/ML
INJECTION INTRAMUSCULAR; INTRAVENOUS AS NEEDED
Status: DISCONTINUED | OUTPATIENT
Start: 2023-10-06 | End: 2023-10-06 | Stop reason: SURG

## 2023-10-06 RX ORDER — OXYCODONE HYDROCHLORIDE AND ACETAMINOPHEN 5; 325 MG/1; MG/1
1 TABLET ORAL ONCE AS NEEDED
Status: DISCONTINUED | OUTPATIENT
Start: 2023-10-06 | End: 2023-10-06 | Stop reason: HOSPADM

## 2023-10-06 RX ORDER — SODIUM CHLORIDE, SODIUM LACTATE, POTASSIUM CHLORIDE, CALCIUM CHLORIDE 600; 310; 30; 20 MG/100ML; MG/100ML; MG/100ML; MG/100ML
INJECTION, SOLUTION INTRAVENOUS CONTINUOUS PRN
Status: DISCONTINUED | OUTPATIENT
Start: 2023-10-06 | End: 2023-10-06 | Stop reason: SURG

## 2023-10-06 RX ORDER — FENTANYL CITRATE 50 UG/ML
50 INJECTION, SOLUTION INTRAMUSCULAR; INTRAVENOUS
Status: DISCONTINUED | OUTPATIENT
Start: 2023-10-06 | End: 2023-10-06 | Stop reason: HOSPADM

## 2023-10-06 RX ORDER — SODIUM CHLORIDE, SODIUM LACTATE, POTASSIUM CHLORIDE, CALCIUM CHLORIDE 600; 310; 30; 20 MG/100ML; MG/100ML; MG/100ML; MG/100ML
125 INJECTION, SOLUTION INTRAVENOUS ONCE
Status: COMPLETED | OUTPATIENT
Start: 2023-10-06 | End: 2023-10-06

## 2023-10-06 RX ORDER — FENTANYL CITRATE 50 UG/ML
INJECTION, SOLUTION INTRAMUSCULAR; INTRAVENOUS AS NEEDED
Status: DISCONTINUED | OUTPATIENT
Start: 2023-10-06 | End: 2023-10-06 | Stop reason: SURG

## 2023-10-06 RX ORDER — MIDAZOLAM HYDROCHLORIDE 1 MG/ML
0.5 INJECTION INTRAMUSCULAR; INTRAVENOUS
Status: DISCONTINUED | OUTPATIENT
Start: 2023-10-06 | End: 2023-10-06 | Stop reason: HOSPADM

## 2023-10-06 RX ORDER — SODIUM CHLORIDE 9 MG/ML
40 INJECTION, SOLUTION INTRAVENOUS AS NEEDED
Status: DISCONTINUED | OUTPATIENT
Start: 2023-10-06 | End: 2023-10-06 | Stop reason: HOSPADM

## 2023-10-06 RX ORDER — SODIUM CHLORIDE 0.9 % (FLUSH) 0.9 %
10 SYRINGE (ML) INJECTION AS NEEDED
Status: DISCONTINUED | OUTPATIENT
Start: 2023-10-06 | End: 2023-10-06 | Stop reason: HOSPADM

## 2023-10-06 RX ORDER — ONDANSETRON 2 MG/ML
4 INJECTION INTRAMUSCULAR; INTRAVENOUS AS NEEDED
Status: DISCONTINUED | OUTPATIENT
Start: 2023-10-06 | End: 2023-10-06 | Stop reason: HOSPADM

## 2023-10-06 RX ORDER — GENTAMICIN SULFATE 80 MG/100ML
80 INJECTION, SOLUTION INTRAVENOUS ONCE
Status: COMPLETED | OUTPATIENT
Start: 2023-10-06 | End: 2023-10-06

## 2023-10-06 RX ORDER — SODIUM CHLORIDE, SODIUM LACTATE, POTASSIUM CHLORIDE, CALCIUM CHLORIDE 600; 310; 30; 20 MG/100ML; MG/100ML; MG/100ML; MG/100ML
100 INJECTION, SOLUTION INTRAVENOUS ONCE AS NEEDED
Status: DISCONTINUED | OUTPATIENT
Start: 2023-10-06 | End: 2023-10-06 | Stop reason: HOSPADM

## 2023-10-06 RX ADMIN — SODIUM CHLORIDE, POTASSIUM CHLORIDE, SODIUM LACTATE AND CALCIUM CHLORIDE: 600; 310; 30; 20 INJECTION, SOLUTION INTRAVENOUS at 08:11

## 2023-10-06 RX ADMIN — GENTAMICIN SULFATE 80 MG: 80 INJECTION, SOLUTION INTRAVENOUS at 08:11

## 2023-10-06 RX ADMIN — FENTANYL CITRATE 50 MCG: 50 INJECTION INTRAMUSCULAR; INTRAVENOUS at 08:50

## 2023-10-06 RX ADMIN — SODIUM CHLORIDE, POTASSIUM CHLORIDE, SODIUM LACTATE AND CALCIUM CHLORIDE 125 ML/HR: 600; 310; 30; 20 INJECTION, SOLUTION INTRAVENOUS at 07:15

## 2023-10-06 RX ADMIN — PROPOFOL 140 MG: 10 INJECTION, EMULSION INTRAVENOUS at 08:16

## 2023-10-06 RX ADMIN — FAMOTIDINE 20 MG: 10 INJECTION, SOLUTION INTRAVENOUS at 08:11

## 2023-10-06 RX ADMIN — ONDANSETRON 4 MG: 2 INJECTION INTRAMUSCULAR; INTRAVENOUS at 08:11

## 2023-10-06 RX ADMIN — FENTANYL CITRATE 50 MCG: 50 INJECTION INTRAMUSCULAR; INTRAVENOUS at 08:11

## 2023-10-06 NOTE — OP NOTE
EXTRACORPOREAL SHOCKWAVE LITHOTRIPSY  Procedure Note    Ye Butler  10/6/2023    Pre-op Diagnosis:   Left renal stone [N20.0]    Post-op Diagnosis:     Post-Op Diagnosis Codes:     * Left renal stone [N20.0]    Procedure/CPT® Codes:  73-year-old white male who passed a large right distal stone which was 70% uric acid has a left lower pole stone and is here for lithotripsy.  ESWL-the patient is a candidate for extracorporeal shockwave lithotripsy.  We discussed the type of stone and the complications associated with the procedure including, but not limited to, pain in the flank, hematoma, spontaneous renal hemorrhage, inadequate fragmentation of stones, the need for passage of the stones, the need for concomitant additional procedures in the range of 24%, the risk of a distal fragment in the range of 3% requiring ureteroscopic removal, and the fact that sometimes a stent is indicated based on the size and the density of the stone as determined on the CAT scan.  Additionally, we discussed percutaneous nephrostolithotomy.  Including the mini PERC.  With its attendant risks of anesthesia bleeding infection and the fact that is a invasive procedure with the remote possibility of a nephrectomy.  We also discussed the use of ureteroscopy which is a rigid or flexible instrument placed up into the kidney to break up stones with the laser beam and very likely a postop stent and a high likelihood of additional concomitant procedures.  Following an informed consent, he was brought to the operative suite and underwent induction of general endotracheal anesthetic.  The stone was localized at F2 and a total of 3000 shockwaves was administered without complication.  There was excellent fragmentation  He was awake and alert and returned to recovery room.         Procedure(s):  EXTRACORPOREAL SHOCKWAVE LITHOTRIPSY    Surgeon(s):  Nabeel Melendrez MD    Anesthesia: see anesthesia record    Staff:   Circulator: Priscilla  Rivka Adair RN  Scrub Person: Steph Parks LPN; Estefania Rainey; Kellie Kirk    Estimated Blood Loss: none  Urine Voided: * No values recorded between 10/6/2023  8:12 AM and 10/6/2023  8:51 AM *    Specimens:                None      Drains: None    Findings: Excellent fragmentation    Blood: N/A    Complications: None    Grafts and Implants: None    Nabeel Melendrez MD     Date: 10/6/2023  Time: 08:51 EDT

## 2023-10-06 NOTE — ANESTHESIA PROCEDURE NOTES
Airway  Urgency: elective    Date/Time: 10/6/2023 8:17 AM  Airway not difficult    General Information and Staff    Patient location during procedure: OR  CRNA/CAA: Silvia Longoria CRNA    Indications and Patient Condition  Indications for airway management: airway protection    Preoxygenated: yes  MILS maintained throughout  Mask difficulty assessment: 0 - not attempted    Final Airway Details  Final airway type: supraglottic airway      Successful airway: unique  Size 4     Number of attempts at approach: 1  Assessment: lips, teeth, and gum same as pre-op

## 2023-10-06 NOTE — ANESTHESIA POSTPROCEDURE EVALUATION
Patient: Ye Butler    Procedure Summary       Date: 10/06/23 Room / Location: [facility] OR 09 / [facility] OR    Anesthesia Start: 0811 Anesthesia Stop: 0851    Procedure: EXTRACORPOREAL SHOCKWAVE LITHOTRIPSY (Left) Diagnosis:       Left renal stone      (Left renal stone [N20.0])    Surgeons: Nabeel Melendrez MD Provider: Art Segundo MD    Anesthesia Type: general ASA Status: 3            Anesthesia Type: general    Vitals  Vitals Value Taken Time   /88 10/06/23 0922   Temp 97.9 °F (36.6 °C) 10/06/23 0852   Pulse 63 10/06/23 0922   Resp 18 10/06/23 0922   SpO2 97 % 10/06/23 0922           Post Anesthesia Care and Evaluation    Patient location during evaluation: PHASE II  Patient participation: complete - patient participated  Level of consciousness: awake and alert  Pain score: 0  Pain management: adequate    Airway patency: patent  Anesthetic complications: No anesthetic complications    Cardiovascular status: acceptable  Respiratory status: acceptable  Hydration status: acceptable

## 2023-10-06 NOTE — ANESTHESIA PREPROCEDURE EVALUATION
Anesthesia Evaluation     Patient summary reviewed and Nursing notes reviewed   history of anesthetic complications:  PONV  NPO Solid Status: > 8 hours  NPO Liquid Status: > 8 hours           Airway   Mallampati: II  TM distance: >3 FB  Neck ROM: full  No difficulty expected and Large neck circumference  Dental    (+) edentulous    Pulmonary - normal exam    breath sounds clear to auscultation  (+) a smoker Former, COPD,shortness of breath  Cardiovascular - normal exam    ECG reviewed  Patient on routine beta blocker and Beta blocker given within 24 hours of surgery  Rhythm: regular  Rate: normal    (+) hypertension, past MI , CAD, IVAN, hyperlipidemia      Neuro/Psych  (+) CVA, dizziness/light headedness  GI/Hepatic/Renal/Endo    (+) obesity, hiatal hernia, GERD, PUD, renal disease, diabetes mellitus type 2 poorly controlled, thyroid problem hypothyroidism    Musculoskeletal     Abdominal  - normal exam   Substance History - negative use     OB/GYN negative ob/gyn ROS         Other   arthritis,     ROS/Med Hx Other: Echo 4/4/2023:  1.  LV size, function, and wall motion are normal.  Visually estimated ejection fraction is 50 to 55%.  Mild concentric left ventricular hypertrophy with grade 1 diastolic dysfunction.  Mild to moderate left atrial and mild right atrial enlargement.  RV size and function are preserved.  No septal defect or intracavitary mass or thrombus.    2.  Valves are morphologically normal with no stenotic lesions or valve associated masses or thrombi.  There is trivial MR and TR.    3.  No pericardial or great vessel pathology.    4.  Pulmonary artery systolic pressures are estimated in the low 30s.                     Anesthesia Plan    ASA 3     general   total IV anesthesia  intravenous induction     Anesthetic plan, risks, benefits, and alternatives have been provided, discussed and informed consent has been obtained with: patient.  Pre-procedure education provided  Use of blood products  discussed with  Consented to blood products.    Plan discussed with CRNA.    CODE STATUS:

## 2023-10-18 ENCOUNTER — OFFICE VISIT (OUTPATIENT)
Dept: CARDIOLOGY | Facility: CLINIC | Age: 73
End: 2023-10-18
Payer: MEDICARE

## 2023-10-18 ENCOUNTER — LAB (OUTPATIENT)
Dept: CARDIOLOGY | Facility: CLINIC | Age: 73
End: 2023-10-18
Payer: MEDICARE

## 2023-10-18 VITALS
OXYGEN SATURATION: 97 % | HEIGHT: 69 IN | DIASTOLIC BLOOD PRESSURE: 97 MMHG | SYSTOLIC BLOOD PRESSURE: 191 MMHG | HEART RATE: 70 BPM | BODY MASS INDEX: 30.66 KG/M2 | WEIGHT: 207 LBS

## 2023-10-18 DIAGNOSIS — R07.9 CHEST PAIN, UNSPECIFIED TYPE: ICD-10-CM

## 2023-10-18 DIAGNOSIS — I25.119 CORONARY ARTERY DISEASE INVOLVING NATIVE CORONARY ARTERY OF NATIVE HEART WITH ANGINA PECTORIS: Primary | ICD-10-CM

## 2023-10-18 DIAGNOSIS — I25.119 CORONARY ARTERY DISEASE INVOLVING NATIVE CORONARY ARTERY OF NATIVE HEART WITH ANGINA PECTORIS: ICD-10-CM

## 2023-10-18 DIAGNOSIS — R06.02 SOB (SHORTNESS OF BREATH): ICD-10-CM

## 2023-10-18 LAB
ALBUMIN SERPL-MCNC: 4.2 G/DL (ref 3.5–5.2)
ALBUMIN/GLOB SERPL: 1.6 G/DL
ALP SERPL-CCNC: 78 U/L (ref 39–117)
ALT SERPL W P-5'-P-CCNC: 11 U/L (ref 1–41)
ANION GAP SERPL CALCULATED.3IONS-SCNC: 13.8 MMOL/L (ref 5–15)
AST SERPL-CCNC: 14 U/L (ref 1–40)
BASOPHILS # BLD AUTO: 0.07 10*3/MM3 (ref 0–0.2)
BASOPHILS NFR BLD AUTO: 1 % (ref 0–1.5)
BILIRUB SERPL-MCNC: 0.4 MG/DL (ref 0–1.2)
BUN SERPL-MCNC: 13 MG/DL (ref 8–23)
BUN/CREAT SERPL: 9.8 (ref 7–25)
CALCIUM SPEC-SCNC: 9.1 MG/DL (ref 8.6–10.5)
CHLORIDE SERPL-SCNC: 102 MMOL/L (ref 98–107)
CO2 SERPL-SCNC: 22.2 MMOL/L (ref 22–29)
CREAT SERPL-MCNC: 1.32 MG/DL (ref 0.76–1.27)
DEPRECATED RDW RBC AUTO: 47 FL (ref 37–54)
EGFRCR SERPLBLD CKD-EPI 2021: 57.7 ML/MIN/1.73
EOSINOPHIL # BLD AUTO: 0.13 10*3/MM3 (ref 0–0.4)
EOSINOPHIL NFR BLD AUTO: 1.9 % (ref 0.3–6.2)
ERYTHROCYTE [DISTWIDTH] IN BLOOD BY AUTOMATED COUNT: 15.2 % (ref 12.3–15.4)
GLOBULIN UR ELPH-MCNC: 2.7 GM/DL
GLUCOSE SERPL-MCNC: 160 MG/DL (ref 65–99)
HCT VFR BLD AUTO: 49.8 % (ref 37.5–51)
HGB BLD-MCNC: 16.2 G/DL (ref 13–17.7)
IMM GRANULOCYTES # BLD AUTO: 0.04 10*3/MM3 (ref 0–0.05)
IMM GRANULOCYTES NFR BLD AUTO: 0.6 % (ref 0–0.5)
LYMPHOCYTES # BLD AUTO: 1.42 10*3/MM3 (ref 0.7–3.1)
LYMPHOCYTES NFR BLD AUTO: 20.9 % (ref 19.6–45.3)
MCH RBC QN AUTO: 27.5 PG (ref 26.6–33)
MCHC RBC AUTO-ENTMCNC: 32.5 G/DL (ref 31.5–35.7)
MCV RBC AUTO: 84.6 FL (ref 79–97)
MONOCYTES # BLD AUTO: 0.41 10*3/MM3 (ref 0.1–0.9)
MONOCYTES NFR BLD AUTO: 6 % (ref 5–12)
NEUTROPHILS NFR BLD AUTO: 4.74 10*3/MM3 (ref 1.7–7)
NEUTROPHILS NFR BLD AUTO: 69.6 % (ref 42.7–76)
NRBC BLD AUTO-RTO: 0 /100 WBC (ref 0–0.2)
PLATELET # BLD AUTO: 221 10*3/MM3 (ref 140–450)
PMV BLD AUTO: 12 FL (ref 6–12)
POTASSIUM SERPL-SCNC: 4.6 MMOL/L (ref 3.5–5.2)
PROT SERPL-MCNC: 6.9 G/DL (ref 6–8.5)
RBC # BLD AUTO: 5.89 10*6/MM3 (ref 4.14–5.8)
SODIUM SERPL-SCNC: 138 MMOL/L (ref 136–145)
WBC NRBC COR # BLD: 6.81 10*3/MM3 (ref 3.4–10.8)

## 2023-10-18 PROCEDURE — 80053 COMPREHEN METABOLIC PANEL: CPT | Performed by: PHYSICIAN ASSISTANT

## 2023-10-18 PROCEDURE — 85025 COMPLETE CBC W/AUTO DIFF WBC: CPT | Performed by: PHYSICIAN ASSISTANT

## 2023-10-18 RX ORDER — ISOSORBIDE MONONITRATE 30 MG/1
30 TABLET, EXTENDED RELEASE ORAL EVERY MORNING
Qty: 30 TABLET | Refills: 11 | Status: SHIPPED | OUTPATIENT
Start: 2023-10-18

## 2023-10-18 RX ORDER — AMLODIPINE BESYLATE 5 MG/1
5 TABLET ORAL 2 TIMES DAILY
Qty: 60 TABLET | Refills: 5 | Status: SHIPPED | OUTPATIENT
Start: 2023-10-18

## 2023-10-18 RX ORDER — NITROGLYCERIN 0.4 MG/1
TABLET SUBLINGUAL
Qty: 100 TABLET | Refills: 11 | Status: SHIPPED | OUTPATIENT
Start: 2023-10-18

## 2023-10-18 NOTE — PROGRESS NOTES
Problem list     Subjective   Ye Butler is a 73 y.o. male     Chief Complaint   Patient presents with    Follow-up     7 months   Problem list  1.  Coronary artery disease  1.1 cardiac catheterization March 2018 because of refractory angina despite negative stress testing demonstrated high-grade RCA disease status post stenting.  Patient underwent stenting ×2 of the circumflex with 70% distal LAD disease and medical management recommended.  1.2 stress test October 2018 demonstrates no evidence of ischemia and preserved LV function  1.3 stress test April 2023 with no evidence of ischemia and mildly depressed systolic function of 51%  1.4 continued symptoms on antianginal therapy.  2.  Accelerated Idioventricular rhythm  2.1 EKG at primary office demonstrated idioventricular rhythm at 90 bpm.  This was prior to catheterization  2.2 event monitor June 2021 with sinus rhythm and occasional PVCs.  No other arrhythmias noted  3.  Hypertension  4..  Dyslipidemia  5.  Type 2 diabetes mellitus  6.  Family history of premature coronary artery disease and a brother with sudden cardiac death       HPI    Patient is a 73-year-old male that presents to the office for evaluation.    Patient has been monitored through the office and has history of chest discomfort.  Patient describes feeling significant pressure.  Patient feels heaviness or pressure at times.  Patient has history of stenting but approximately 5 to 6 years ago.    Despite the fact that patient has been on antianginal therapy, he continues to feel pressure.  Patient also has a significant amount of dyspnea.  No progressive shortness of breath.  No PND or orthopnea.    Patient does not palpitate nor does patient complain of dysrhythmic symptoms.  He is stable otherwise.      Current Outpatient Medications on File Prior to Visit   Medication Sig Dispense Refill    aspirin 81 MG tablet Take 1 tablet by mouth Daily. 30 tablet 11    Docusate Sodium 100 MG capsule  Take 1 tablet by mouth As Needed for Constipation.      glimepiride (AMARYL) 4 MG tablet Take 1 tablet by mouth Daily.      levothyroxine (SYNTHROID, LEVOTHROID) 50 MCG tablet Take 1 tablet by mouth Daily.  0    Linzess 145 MCG capsule capsule       lisinopril (PRINIVIL,ZESTRIL) 20 MG tablet Take 1 tablet by mouth 2 (Two) Times a Day. 60 tablet 5    memantine (NAMENDA) 5 MG tablet Take 1 tablet by mouth Daily.      metFORMIN (GLUCOPHAGE) 500 MG tablet Take 1 tablet by mouth 2 (Two) Times a Day With Meals.  0    metoprolol tartrate (LOPRESSOR) 25 MG tablet       montelukast (SINGULAIR) 10 MG tablet Take 1 tablet by mouth Every Night.      NON FORMULARY Morphine pump      ondansetron ODT (ZOFRAN-ODT) 4 MG disintegrating tablet Place 1 tablet on the tongue Every 6 (Six) Hours As Needed for Nausea or Vomiting. 10 tablet 0    pantoprazole (PROTONIX) 40 MG EC tablet       pravastatin (PRAVACHOL) 40 MG tablet       sertraline (ZOLOFT) 25 MG tablet Take 1 tablet by mouth Daily.      tamsulosin (FLOMAX) 0.4 MG capsule 24 hr capsule 1 capsule Daily.      Testosterone Cypionate (DEPOTESTOTERONE CYPIONATE) 200 MG/ML injection Inject 1 mL into the appropriate muscle as directed by prescriber Every 21 (Twenty-One) Days.      traZODone (DESYREL) 50 MG tablet       Zolpidem Tartrate 5 MG/ACT solution Take  by mouth.      [DISCONTINUED] amLODIPine (NORVASC) 5 MG tablet Take 1 tablet by mouth Daily.      [DISCONTINUED] nitroglycerin (NITROSTAT) 0.4 MG SL tablet 1 under the tongue as needed for angina, may repeat q5mins for up three doses 100 tablet 11     No current facility-administered medications on file prior to visit.       Claritin [loratadine] and Codeine    Past Medical History:   Diagnosis Date    Appendicitis     Arthritis     COPD (chronic obstructive pulmonary disease)     Coronary artery disease     COVID-19 2021    Diabetes mellitus     Disease of thyroid gland     Diverticulitis     Dizzy     Elevated cholesterol      "Emphysema lung     Emphysema lung     Emphysema lung     GERD (gastroesophageal reflux disease)     Hiatal hernia     Hyperlipidemia     Hypertension     Kidney stone     MI (myocardial infarction)     PONV (postoperative nausea and vomiting)     Stomach ulcer     Stroke 2018       Social History     Socioeconomic History    Marital status:    Tobacco Use    Smoking status: Former     Years: 20     Types: Cigarettes     Quit date:      Years since quittin.8    Smokeless tobacco: Never    Tobacco comments:     quit 20+ yrs ago    Vaping Use    Vaping Use: Never used   Substance and Sexual Activity    Alcohol use: No     Comment: former use 20 + yrs ago     Drug use: No    Sexual activity: Defer       Family History   Problem Relation Age of Onset    Cancer Mother     Heart disease Father     Stroke Father        Review of Systems   Constitutional: Negative.    HENT: Negative.     Eyes: Negative.  Negative for visual disturbance.   Respiratory:  Positive for shortness of breath. Negative for apnea, cough, chest tightness and wheezing.    Cardiovascular: Negative.  Positive for chest pain. Negative for palpitations and leg swelling.   Gastrointestinal: Negative.  Negative for blood in stool.   Endocrine: Negative.    Genitourinary: Negative.  Negative for hematuria.   Musculoskeletal: Negative.    Skin: Negative.  Negative for color change, rash and wound.   Allergic/Immunologic: Negative.    Neurological:  Negative for dizziness, syncope, weakness, light-headedness, numbness and headaches.   Hematological: Negative.  Does not bruise/bleed easily.   Psychiatric/Behavioral: Negative.         Objective   Vitals:    10/18/23 1117   BP: (!) 191/97   Pulse: 70   SpO2: 97%   Weight: 93.9 kg (207 lb)   Height: 175.3 cm (69\")      BP (!) 191/97   Pulse 70   Ht 175.3 cm (69\")   Wt 93.9 kg (207 lb)   SpO2 97%   BMI 30.57 kg/m²     Lab Results (most recent)       None            Physical Exam  Vitals and " nursing note reviewed.   Constitutional:       General: He is not in acute distress.     Appearance: Normal appearance. He is well-developed.   HENT:      Head: Normocephalic and atraumatic.   Eyes:      General: No scleral icterus.        Right eye: No discharge.         Left eye: No discharge.      Conjunctiva/sclera: Conjunctivae normal.   Neck:      Vascular: No carotid bruit.   Cardiovascular:      Rate and Rhythm: Normal rate and regular rhythm.      Heart sounds: Normal heart sounds. No murmur heard.     No friction rub. No gallop.   Pulmonary:      Effort: Pulmonary effort is normal. No respiratory distress.      Breath sounds: Normal breath sounds. No wheezing or rales.   Chest:      Chest wall: No tenderness.   Musculoskeletal:      Right lower leg: No edema.      Left lower leg: No edema.   Skin:     General: Skin is warm and dry.      Coloration: Skin is not pale.      Findings: No erythema or rash.   Neurological:      Mental Status: He is alert and oriented to person, place, and time.      Cranial Nerves: No cranial nerve deficit.   Psychiatric:         Behavior: Behavior normal.         Procedure   Procedures       Assessment & Plan     Problems Addressed this Visit          Cardiac and Vasculature    Chest pain    Relevant Orders    [facility]    CBC & Differential    Comprehensive Metabolic Panel    Coronary artery disease involving native coronary artery of native heart with angina pectoris - Primary    Relevant Medications    amLODIPine (NORVASC) 5 MG tablet    isosorbide mononitrate (IMDUR) 30 MG 24 hr tablet    nitroglycerin (NITROSTAT) 0.4 MG SL tablet    Other Relevant Orders    [facility]    CBC & Differential    Comprehensive Metabolic Panel       Pulmonary and Pneumonias    SOB (shortness of breath)    Relevant Orders    [facility]    CBC & Differential    Comprehensive Metabolic Panel     Diagnoses         Codes Comments    Coronary artery disease involving  native coronary artery of native heart with angina pectoris    -  Primary ICD-10-CM: I25.119  ICD-9-CM: 414.01, 413.9     Chest pain, unspecified type     ICD-10-CM: R07.9  ICD-9-CM: 786.50     SOB (shortness of breath)     ICD-10-CM: R06.02  ICD-9-CM: 786.05           Recommendation  1.  Patient is a 73-year-old male who presents back for follow-up with history of coronary disease with multivessel stenting.  He had recent negative stress test but continues to feel significant substernal pressure that occurs at random.  He does not describe this being with exertion.  Patient has been placed on antianginal therapy with continued significant pressure in a patient that has known disease and diabetes mellitus.  Because of the refractory nature of his symptoms and significant dyspnea, we would like to schedule for catheterization to evaluate.    2.  Otherwise, we will continue dual antiplatelet therapy at this time.  We will continue statin therapy.  Last lipid parameters that I review, cholesterol was reasonable at that time with LDL in the 70s.  We can hopefully recheck soon.    3.  Otherwise, any chest pain, not resolved by nitroglycerin, as discussed with the patient, I want him to go to the ER.    4.  We will see him back for follow-up after catheterization to recommend further.    5.  Of note, I am increasing amlodipine to 10mg daily to help with blood pressure control.  We will continue to monitor.  We will see him back as discussed.  Follow-up with primary as scheduled.      Patient did not bring med list or medicine bottles to appointment, med list has been reviewed and updated based on patient's knowledge of their meds.      Advance Care Planning   ACP discussion was declined by the patient. Patient does not have an advance directive, declines further assistance.              Electronically signed by:

## 2023-10-18 NOTE — LETTER
October 18, 2023       No Recipients    Patient: Ye Butler   YOB: 1950   Date of Visit: 10/18/2023       Dear Rose Stoll MD    Ye Butler was in my office today. Below is a copy of my note.    If you have questions, please do not hesitate to call me. I look forward to following Ye along with you.         Sincerely,        THONY Newberry        CC:   No Recipients    Problem list     Subjective  Ye Butler is a 73 y.o. male     Chief Complaint   Patient presents with   • Follow-up     7 months   Problem list  1.  Coronary artery disease  1.1 cardiac catheterization March 2018 because of refractory angina despite negative stress testing demonstrated high-grade RCA disease status post stenting.  Patient underwent stenting ×2 of the circumflex with 70% distal LAD disease and medical management recommended.  1.2 stress test October 2018 demonstrates no evidence of ischemia and preserved LV function  1.3 stress test April 2023 with no evidence of ischemia and mildly depressed systolic function of 51%  1.4 continued symptoms on antianginal therapy.  2.  Accelerated Idioventricular rhythm  2.1 EKG at primary office demonstrated idioventricular rhythm at 90 bpm.  This was prior to catheterization  2.2 event monitor June 2021 with sinus rhythm and occasional PVCs.  No other arrhythmias noted  3.  Hypertension  4..  Dyslipidemia  5.  Type 2 diabetes mellitus  6.  Family history of premature coronary artery disease and a brother with sudden cardiac death       HPI    Patient is a 73-year-old male that presents to the office for evaluation.    Patient has been monitored through the office and has history of chest discomfort.  Patient describes feeling significant pressure.  Patient feels heaviness or pressure at times.  Patient has history of stenting but approximately 5 to 6 years ago.    Despite the fact that patient has been on antianginal therapy, he continues to feel  pressure.  Patient also has a significant amount of dyspnea.  No progressive shortness of breath.  No PND or orthopnea.    Patient does not palpitate nor does patient complain of dysrhythmic symptoms.  He is stable otherwise.      Current Outpatient Medications on File Prior to Visit   Medication Sig Dispense Refill   • aspirin 81 MG tablet Take 1 tablet by mouth Daily. 30 tablet 11   • Docusate Sodium 100 MG capsule Take 1 tablet by mouth As Needed for Constipation.     • glimepiride (AMARYL) 4 MG tablet Take 1 tablet by mouth Daily.     • levothyroxine (SYNTHROID, LEVOTHROID) 50 MCG tablet Take 1 tablet by mouth Daily.  0   • Linzess 145 MCG capsule capsule      • lisinopril (PRINIVIL,ZESTRIL) 20 MG tablet Take 1 tablet by mouth 2 (Two) Times a Day. 60 tablet 5   • memantine (NAMENDA) 5 MG tablet Take 1 tablet by mouth Daily.     • metFORMIN (GLUCOPHAGE) 500 MG tablet Take 1 tablet by mouth 2 (Two) Times a Day With Meals.  0   • metoprolol tartrate (LOPRESSOR) 25 MG tablet      • montelukast (SINGULAIR) 10 MG tablet Take 1 tablet by mouth Every Night.     • NON FORMULARY Morphine pump     • ondansetron ODT (ZOFRAN-ODT) 4 MG disintegrating tablet Place 1 tablet on the tongue Every 6 (Six) Hours As Needed for Nausea or Vomiting. 10 tablet 0   • pantoprazole (PROTONIX) 40 MG EC tablet      • pravastatin (PRAVACHOL) 40 MG tablet      • sertraline (ZOLOFT) 25 MG tablet Take 1 tablet by mouth Daily.     • tamsulosin (FLOMAX) 0.4 MG capsule 24 hr capsule 1 capsule Daily.     • Testosterone Cypionate (DEPOTESTOTERONE CYPIONATE) 200 MG/ML injection Inject 1 mL into the appropriate muscle as directed by prescriber Every 21 (Twenty-One) Days.     • traZODone (DESYREL) 50 MG tablet      • Zolpidem Tartrate 5 MG/ACT solution Take  by mouth.     • [DISCONTINUED] amLODIPine (NORVASC) 5 MG tablet Take 1 tablet by mouth Daily.     • [DISCONTINUED] nitroglycerin (NITROSTAT) 0.4 MG SL tablet 1 under the tongue as needed for angina,  may repeat q5mins for up three doses 100 tablet 11     No current facility-administered medications on file prior to visit.       Claritin [loratadine] and Codeine    Past Medical History:   Diagnosis Date   • Appendicitis    • Arthritis    • COPD (chronic obstructive pulmonary disease)    • Coronary artery disease    • COVID-19    • Diabetes mellitus    • Disease of thyroid gland    • Diverticulitis    • Dizzy    • Elevated cholesterol    • Emphysema lung    • Emphysema lung    • Emphysema lung    • GERD (gastroesophageal reflux disease)    • Hiatal hernia    • Hyperlipidemia    • Hypertension    • Kidney stone    • MI (myocardial infarction)    • PONV (postoperative nausea and vomiting)    • Stomach ulcer    • Stroke 2018       Social History     Socioeconomic History   • Marital status:    Tobacco Use   • Smoking status: Former     Years: 20     Types: Cigarettes     Quit date:      Years since quittin.8   • Smokeless tobacco: Never   • Tobacco comments:     quit 20+ yrs ago    Vaping Use   • Vaping Use: Never used   Substance and Sexual Activity   • Alcohol use: No     Comment: former use 20 + yrs ago    • Drug use: No   • Sexual activity: Defer       Family History   Problem Relation Age of Onset   • Cancer Mother    • Heart disease Father    • Stroke Father        Review of Systems   Constitutional: Negative.    HENT: Negative.     Eyes: Negative.  Negative for visual disturbance.   Respiratory:  Positive for shortness of breath. Negative for apnea, cough, chest tightness and wheezing.    Cardiovascular: Negative.  Positive for chest pain. Negative for palpitations and leg swelling.   Gastrointestinal: Negative.  Negative for blood in stool.   Endocrine: Negative.    Genitourinary: Negative.  Negative for hematuria.   Musculoskeletal: Negative.    Skin: Negative.  Negative for color change, rash and wound.   Allergic/Immunologic: Negative.    Neurological:  Negative for dizziness, syncope,  "weakness, light-headedness, numbness and headaches.   Hematological: Negative.  Does not bruise/bleed easily.   Psychiatric/Behavioral: Negative.         Objective  Vitals:    10/18/23 1117   BP: (!) 191/97   Pulse: 70   SpO2: 97%   Weight: 93.9 kg (207 lb)   Height: 175.3 cm (69\")      BP (!) 191/97   Pulse 70   Ht 175.3 cm (69\")   Wt 93.9 kg (207 lb)   SpO2 97%   BMI 30.57 kg/m²     Lab Results (most recent)       None            Physical Exam  Vitals and nursing note reviewed.   Constitutional:       General: He is not in acute distress.     Appearance: Normal appearance. He is well-developed.   HENT:      Head: Normocephalic and atraumatic.   Eyes:      General: No scleral icterus.        Right eye: No discharge.         Left eye: No discharge.      Conjunctiva/sclera: Conjunctivae normal.   Neck:      Vascular: No carotid bruit.   Cardiovascular:      Rate and Rhythm: Normal rate and regular rhythm.      Heart sounds: Normal heart sounds. No murmur heard.     No friction rub. No gallop.   Pulmonary:      Effort: Pulmonary effort is normal. No respiratory distress.      Breath sounds: Normal breath sounds. No wheezing or rales.   Chest:      Chest wall: No tenderness.   Musculoskeletal:      Right lower leg: No edema.      Left lower leg: No edema.   Skin:     General: Skin is warm and dry.      Coloration: Skin is not pale.      Findings: No erythema or rash.   Neurological:      Mental Status: He is alert and oriented to person, place, and time.      Cranial Nerves: No cranial nerve deficit.   Psychiatric:         Behavior: Behavior normal.         Procedure  Procedures       Assessment & Plan    Problems Addressed this Visit          Cardiac and Vasculature    Chest pain    Relevant Orders    [facility]    CBC & Differential    Comprehensive Metabolic Panel    Coronary artery disease involving native coronary artery of native heart with angina pectoris - Primary    Relevant Medications    " amLODIPine (NORVASC) 5 MG tablet    isosorbide mononitrate (IMDUR) 30 MG 24 hr tablet    nitroglycerin (NITROSTAT) 0.4 MG SL tablet    Other Relevant Orders    [facility] Cath    CBC & Differential    Comprehensive Metabolic Panel       Pulmonary and Pneumonias    SOB (shortness of breath)    Relevant Orders    [facility] Cath    CBC & Differential    Comprehensive Metabolic Panel     Diagnoses         Codes Comments    Coronary artery disease involving native coronary artery of native heart with angina pectoris    -  Primary ICD-10-CM: I25.119  ICD-9-CM: 414.01, 413.9     Chest pain, unspecified type     ICD-10-CM: R07.9  ICD-9-CM: 786.50     SOB (shortness of breath)     ICD-10-CM: R06.02  ICD-9-CM: 786.05           Recommendation  1.  Patient is a 73-year-old male who presents back for follow-up with history of coronary disease with multivessel stenting.  He had recent negative stress test but continues to feel significant substernal pressure that occurs at random.  He does not describe this being with exertion.  Patient has been placed on antianginal therapy with continued significant pressure in a patient that has known disease and diabetes mellitus.  Because of the refractory nature of his symptoms and significant dyspnea, we would like to schedule for catheterization to evaluate.    2.  Otherwise, we will continue dual antiplatelet therapy at this time.  We will continue statin therapy.  Last lipid parameters that I review, cholesterol was reasonable at that time with LDL in the 70s.  We can hopefully recheck soon.    3.  Otherwise, any chest pain, not resolved by nitroglycerin, as discussed with the patient, I want him to go to the ER.    4.  We will see him back for follow-up after catheterization to recommend further.    5.  Of note, I am increasing amlodipine to 10mg daily to help with blood pressure control.  We will continue to monitor.  We will see him back as discussed.  Follow-up with  primary as scheduled.      Patient did not bring med list or medicine bottles to appointment, med list has been reviewed and updated based on patient's knowledge of their meds.      Advance Care Planning  ACP discussion was declined by the patient. Patient does not have an advance directive, declines further assistance.              Electronically signed by:

## 2023-11-06 ENCOUNTER — HOSPITAL ENCOUNTER (OUTPATIENT)
Dept: GENERAL RADIOLOGY | Facility: HOSPITAL | Age: 73
Discharge: HOME | End: 2023-11-06
Admitting: UROLOGY
Payer: MEDICARE

## 2023-11-06 ENCOUNTER — OFFICE VISIT (OUTPATIENT)
Dept: UROLOGY | Facility: CLINIC | Age: 73
End: 2023-11-06
Payer: MEDICARE

## 2023-11-06 VITALS
SYSTOLIC BLOOD PRESSURE: 165 MMHG | WEIGHT: 208.4 LBS | DIASTOLIC BLOOD PRESSURE: 70 MMHG | BODY MASS INDEX: 30.87 KG/M2 | HEART RATE: 83 BPM | HEIGHT: 69 IN

## 2023-11-06 DIAGNOSIS — N20.0 KIDNEY STONE: ICD-10-CM

## 2023-11-06 DIAGNOSIS — N40.1 BENIGN PROSTATIC HYPERPLASIA WITH URINARY FREQUENCY: ICD-10-CM

## 2023-11-06 DIAGNOSIS — R35.0 BENIGN PROSTATIC HYPERPLASIA WITH URINARY FREQUENCY: ICD-10-CM

## 2023-11-06 DIAGNOSIS — N20.0 LEFT RENAL STONE: Primary | ICD-10-CM

## 2023-11-06 PROCEDURE — 74018 RADEX ABDOMEN 1 VIEW: CPT

## 2023-11-06 PROCEDURE — 74018 RADEX ABDOMEN 1 VIEW: CPT | Performed by: RADIOLOGY

## 2023-11-06 NOTE — PROGRESS NOTES
Chief Complaint:      Chief Complaint   Patient presents with    Kidney stone       HPI:   73 y.o. male status post a very successful lithotripsy of a left lower pole stone he is passed a large number of fragments and he has only 1 small fragment left there are some stones in the contralateral side but I Pia recommend observation has had no further infections he feels great he is very dissatisfied with his UroLift.  I will see him back in 6 months with KUB.    Past Medical History:     Past Medical History:   Diagnosis Date    Appendicitis     Arthritis     COPD (chronic obstructive pulmonary disease)     Coronary artery disease     COVID-19 2021    Diabetes mellitus     Disease of thyroid gland     Diverticulitis     Dizzy     Elevated cholesterol     Emphysema lung     Emphysema lung     Emphysema lung     GERD (gastroesophageal reflux disease)     Hiatal hernia     Hyperlipidemia     Hypertension     Kidney stone     MI (myocardial infarction)     PONV (postoperative nausea and vomiting)     Stomach ulcer     Stroke 2018       Current Meds:     Current Outpatient Medications   Medication Sig Dispense Refill    amLODIPine (NORVASC) 5 MG tablet Take 1 tablet by mouth 2 (Two) Times a Day. 60 tablet 5    aspirin 81 MG tablet Take 1 tablet by mouth Daily. 30 tablet 11    Docusate Sodium 100 MG capsule Take 1 tablet by mouth As Needed for Constipation.      glimepiride (AMARYL) 4 MG tablet Take 1 tablet by mouth Daily.      isosorbide mononitrate (IMDUR) 30 MG 24 hr tablet Take 1 tablet by mouth Every Morning. 30 tablet 11    levothyroxine (SYNTHROID, LEVOTHROID) 50 MCG tablet Take 1 tablet by mouth Daily.  0    Linzess 145 MCG capsule capsule       lisinopril (PRINIVIL,ZESTRIL) 20 MG tablet Take 1 tablet by mouth 2 (Two) Times a Day. 60 tablet 5    memantine (NAMENDA) 5 MG tablet Take 1 tablet by mouth Daily.      metFORMIN (GLUCOPHAGE) 500 MG tablet Take 1 tablet by mouth 2 (Two) Times a Day With Meals.  0     metoprolol tartrate (LOPRESSOR) 25 MG tablet       montelukast (SINGULAIR) 10 MG tablet Take 1 tablet by mouth Every Night.      nitroglycerin (NITROSTAT) 0.4 MG SL tablet 1 under the tongue as needed for angina, may repeat q5mins for up three doses 100 tablet 11    NON FORMULARY Morphine pump      ondansetron ODT (ZOFRAN-ODT) 4 MG disintegrating tablet Place 1 tablet on the tongue Every 6 (Six) Hours As Needed for Nausea or Vomiting. 10 tablet 0    pantoprazole (PROTONIX) 40 MG EC tablet       pravastatin (PRAVACHOL) 40 MG tablet       sertraline (ZOLOFT) 25 MG tablet Take 1 tablet by mouth Daily.      tamsulosin (FLOMAX) 0.4 MG capsule 24 hr capsule 1 capsule Daily.      Testosterone Cypionate (DEPOTESTOTERONE CYPIONATE) 200 MG/ML injection Inject 1 mL into the appropriate muscle as directed by prescriber Every 21 (Twenty-One) Days.      traZODone (DESYREL) 50 MG tablet       Zolpidem Tartrate 5 MG/ACT solution Take  by mouth.       No current facility-administered medications for this visit.        Allergies:      Allergies   Allergen Reactions    Claritin [Loratadine] Other (See Comments)     Possible kidney issues    Codeine Other (See Comments)     Kidney issues         Past Surgical History:     Past Surgical History:   Procedure Laterality Date    ABDOMINAL SURGERY      APPENDECTOMY      CARDIAC CATHETERIZATION      COLONOSCOPY      CORONARY STENT PLACEMENT      ENDOSCOPY      EXTRACORPOREAL SHOCK WAVE LITHOTRIPSY (ESWL) Right 07/23/2021    Procedure: EXTRACORPOREAL SHOCKWAVE LITHOTRIPSY;  Surgeon: Nabeel Melendrez MD;  Location: [facility] OR;  Service: Urology;  Laterality: Right;    EXTRACORPOREAL SHOCK WAVE LITHOTRIPSY (ESWL) Right 07/07/2023    Procedure: EXTRACORPOREAL SHOCKWAVE LITHOTRIPSY;  Surgeon: Nabeel Melendrez MD;  Location: [facility];  Service: Urology;  Laterality: Right;    EXTRACORPOREAL SHOCK WAVE LITHOTRIPSY (ESWL) Left 10/6/2023    Procedure: EXTRACORPOREAL SHOCKWAVE  LITHOTRIPSY;  Surgeon: Nabeel Melendrez MD;  Location: [facility];  Service: Urology;  Laterality: Left;    PAIN PUMP TRIAL      TURP / TRANSURETHRAL INCISION / DRAINAGE PROSTATE         Social History:     Social History     Socioeconomic History    Marital status:    Tobacco Use    Smoking status: Former     Years: 20     Types: Cigarettes     Quit date:      Years since quittin.8    Smokeless tobacco: Never    Tobacco comments:     quit 20+ yrs ago    Vaping Use    Vaping Use: Never used   Substance and Sexual Activity    Alcohol use: No     Comment: former use 20 + yrs ago     Drug use: No    Sexual activity: Defer       Family History:     Family History   Problem Relation Age of Onset    Cancer Mother     Heart disease Father     Stroke Father        Review of Systems:     Review of Systems   Constitutional: Negative.    HENT: Negative.     Eyes: Negative.    Respiratory: Negative.     Cardiovascular: Negative.    Gastrointestinal: Negative.    Endocrine: Negative.    Musculoskeletal: Negative.    Allergic/Immunologic: Negative.    Neurological: Negative.    Hematological: Negative.    Psychiatric/Behavioral: Negative.         Physical Exam:     Physical Exam  Vitals and nursing note reviewed.   Constitutional:       Appearance: He is well-developed.   HENT:      Head: Normocephalic and atraumatic.   Eyes:      Conjunctiva/sclera: Conjunctivae normal.      Pupils: Pupils are equal, round, and reactive to light.   Cardiovascular:      Rate and Rhythm: Normal rate and regular rhythm.      Heart sounds: Normal heart sounds.   Pulmonary:      Effort: Pulmonary effort is normal.      Breath sounds: Normal breath sounds.   Abdominal:      General: Bowel sounds are normal.      Palpations: Abdomen is soft.   Musculoskeletal:         General: Normal range of motion.      Cervical back: Normal range of motion.   Skin:     General: Skin is warm and dry.   Neurological:      Mental Status: He is  alert and oriented to person, place, and time.      Deep Tendon Reflexes: Reflexes are normal and symmetric.   Psychiatric:         Behavior: Behavior normal.         Thought Content: Thought content normal.         Judgment: Judgment normal.         I have reviewed the following portions of the patient's history: Allergies, current medications, past family history, past medical history, past social history, past surgical history, problem list, and ROS and confirm it is accurate.    Recent Image (CT and/or KUB):      CT Abdomen and Pelvis: No results found for this or any previous visit.       CT Stone Protocol: Results for orders placed during the hospital encounter of 08/12/23    CT Abdomen Pelvis Stone Protocol    Narrative  Comparison: April 19, 2023.    Lung bases are clear. Slight nodular contour the liver, question  underlying cirrhosis. The gallbladder, pancreas, spleen and adrenal  glands are unremarkable. Nonobstructive left renal stones with the  dominant stone measuring measures 1.2 cm. Stones distal right ureter  with the dominant stone at the distal right ureter measures 6 mm. More  proximal stones measuring 1-2 mm, findings causing moderate hydroureter  and hydronephrosis with perinephric stranding. Mild right renal scarring  with renal atrophy. No obstructive uropathy seen on the left. Small  fat-containing left inguinal hernia. Diverticulosis is noted without  diverticulitis. No bowel obstruction, free air or significant free fluid  seen. The appendix is not seen. Multilevel degenerative changes  thoracolumbar spine.    Impression  Impression:  1. Distal right ureter stone causing moderate obstructive uropathy.    This report was finalized on 8/12/2023 4:14 PM by Phil Lazar DO.       KUB: Results for orders placed during the hospital encounter of 07/28/23    XR Abdomen KUB    Narrative  EXAM:  XR Abdomen, 1 View    EXAM DATE:  7/28/2023 10:53 AM    CLINICAL HISTORY:  flank pain; N20.0-Calculus  of kidney    TECHNIQUE:  Frontal supine view of the abdomen/pelvis.    COMPARISON:  06/14/2023    FINDINGS:  Gastrointestinal tract:  Unremarkable.  No dilation.  Organs:  Small nonobstructing right kidney stone.  14.6 mm stone in  the region of the lower pole left kidney.  Bones/joints:  Unremarkable.  Soft tissues:  Surgical clips lower pelvis.  Other findings:  Prostate calcifications noted.    Impression  1.  Small nonobstructing right kidney stone.  2.  14.6 mm stone in the region of the lower pole left kidney.    This report was finalized on 7/28/2023 11:04 AM by Dr. Magdy Daley MD.       Labs (past 3 months):      Lab on 10/18/2023   Component Date Value Ref Range Status    Glucose 10/18/2023 160 (H)  65 - 99 mg/dL Final    BUN 10/18/2023 13  8 - 23 mg/dL Final    Creatinine 10/18/2023 1.32 (H)  0.76 - 1.27 mg/dL Final    Sodium 10/18/2023 138  136 - 145 mmol/L Final    Potassium 10/18/2023 4.6  3.5 - 5.2 mmol/L Final    Chloride 10/18/2023 102  98 - 107 mmol/L Final    CO2 10/18/2023 22.2  22.0 - 29.0 mmol/L Final    Calcium 10/18/2023 9.1  8.6 - 10.5 mg/dL Final    Total Protein 10/18/2023 6.9  6.0 - 8.5 g/dL Final    Albumin 10/18/2023 4.2  3.5 - 5.2 g/dL Final    ALT (SGPT) 10/18/2023 11  1 - 41 U/L Final    AST (SGOT) 10/18/2023 14  1 - 40 U/L Final    Alkaline Phosphatase 10/18/2023 78  39 - 117 U/L Final    Total Bilirubin 10/18/2023 0.4  0.0 - 1.2 mg/dL Final    Globulin 10/18/2023 2.7  gm/dL Final    A/G Ratio 10/18/2023 1.6  g/dL Final    BUN/Creatinine Ratio 10/18/2023 9.8  7.0 - 25.0 Final    Anion Gap 10/18/2023 13.8  5.0 - 15.0 mmol/L Final    eGFR 10/18/2023 57.7 (L)  >60.0 mL/min/1.73 Final    WBC 10/18/2023 6.81  3.40 - 10.80 10*3/mm3 Final    RBC 10/18/2023 5.89 (H)  4.14 - 5.80 10*6/mm3 Final    Hemoglobin 10/18/2023 16.2  13.0 - 17.7 g/dL Final    Hematocrit 10/18/2023 49.8  37.5 - 51.0 % Final    MCV 10/18/2023 84.6  79.0 - 97.0 fL Final    MCH 10/18/2023 27.5  26.6 - 33.0 pg Final     MCHC 10/18/2023 32.5  31.5 - 35.7 g/dL Final    RDW 10/18/2023 15.2  12.3 - 15.4 % Final    RDW-SD 10/18/2023 47.0  37.0 - 54.0 fl Final    MPV 10/18/2023 12.0  6.0 - 12.0 fL Final    Platelets 10/18/2023 221  140 - 450 10*3/mm3 Final    Neutrophil % 10/18/2023 69.6  42.7 - 76.0 % Final    Lymphocyte % 10/18/2023 20.9  19.6 - 45.3 % Final    Monocyte % 10/18/2023 6.0  5.0 - 12.0 % Final    Eosinophil % 10/18/2023 1.9  0.3 - 6.2 % Final    Basophil % 10/18/2023 1.0  0.0 - 1.5 % Final    Immature Grans % 10/18/2023 0.6 (H)  0.0 - 0.5 % Final    Neutrophils, Absolute 10/18/2023 4.74  1.70 - 7.00 10*3/mm3 Final    Lymphocytes, Absolute 10/18/2023 1.42  0.70 - 3.10 10*3/mm3 Final    Monocytes, Absolute 10/18/2023 0.41  0.10 - 0.90 10*3/mm3 Final    Eosinophils, Absolute 10/18/2023 0.13  0.00 - 0.40 10*3/mm3 Final    Basophils, Absolute 10/18/2023 0.07  0.00 - 0.20 10*3/mm3 Final    Immature Grans, Absolute 10/18/2023 0.04  0.00 - 0.05 10*3/mm3 Final    nRBC 10/18/2023 0.0  0.0 - 0.2 /100 WBC Final   Admission on 10/06/2023, Discharged on 10/06/2023   Component Date Value Ref Range Status    Glucose 10/06/2023 112  70 - 130 mg/dL Final   Pre-Admission Testing on 10/02/2023   Component Date Value Ref Range Status    Glucose 10/02/2023 153 (H)  65 - 99 mg/dL Final    BUN 10/02/2023 13  8 - 23 mg/dL Final    Creatinine 10/02/2023 1.30 (H)  0.76 - 1.27 mg/dL Final    Sodium 10/02/2023 140  136 - 145 mmol/L Final    Potassium 10/02/2023 4.3  3.5 - 5.2 mmol/L Final    Slight hemolysis detected by analyzer. Results may be affected.    Chloride 10/02/2023 105  98 - 107 mmol/L Final    CO2 10/02/2023 21.1 (L)  22.0 - 29.0 mmol/L Final    Calcium 10/02/2023 8.6  8.6 - 10.5 mg/dL Final    BUN/Creatinine Ratio 10/02/2023 10.0  7.0 - 25.0 Final    Anion Gap 10/02/2023 13.9  5.0 - 15.0 mmol/L Final    eGFR 10/02/2023 58.7 (L)  >60.0 mL/min/1.73 Final    WBC 10/02/2023 7.98  3.40 - 10.80 10*3/mm3 Final    RBC 10/02/2023 5.81 (H)  4.14 -  5.80 10*6/mm3 Final    Hemoglobin 10/02/2023 16.2  13.0 - 17.7 g/dL Final    Hematocrit 10/02/2023 48.5  37.5 - 51.0 % Final    MCV 10/02/2023 83.5  79.0 - 97.0 fL Final    MCH 10/02/2023 27.9  26.6 - 33.0 pg Final    MCHC 10/02/2023 33.4  31.5 - 35.7 g/dL Final    RDW 10/02/2023 16.4 (H)  12.3 - 15.4 % Final    RDW-SD 10/02/2023 49.4  37.0 - 54.0 fl Final    MPV 10/02/2023 13.0 (H)  6.0 - 12.0 fL Final    Platelets 10/02/2023 238  140 - 450 10*3/mm3 Final    Neutrophil % 10/02/2023 68.5  42.7 - 76.0 % Final    Lymphocyte % 10/02/2023 21.6  19.6 - 45.3 % Final    Monocyte % 10/02/2023 7.3  5.0 - 12.0 % Final    Eosinophil % 10/02/2023 1.6  0.3 - 6.2 % Final    Basophil % 10/02/2023 0.6  0.0 - 1.5 % Final    Immature Grans % 10/02/2023 0.4  0.0 - 0.5 % Final    Neutrophils, Absolute 10/02/2023 5.47  1.70 - 7.00 10*3/mm3 Final    Lymphocytes, Absolute 10/02/2023 1.72  0.70 - 3.10 10*3/mm3 Final    Monocytes, Absolute 10/02/2023 0.58  0.10 - 0.90 10*3/mm3 Final    Eosinophils, Absolute 10/02/2023 0.13  0.00 - 0.40 10*3/mm3 Final    Basophils, Absolute 10/02/2023 0.05  0.00 - 0.20 10*3/mm3 Final    Immature Grans, Absolute 10/02/2023 0.03  0.00 - 0.05 10*3/mm3 Final    nRBC 10/02/2023 0.0  0.0 - 0.2 /100 WBC Final   Admission on 08/23/2023, Discharged on 08/23/2023   Component Date Value Ref Range Status    Stone Source 08/23/2023 Comment   Final    Not provided    Color 08/23/2023 Springer   Final    Size 08/23/2023 7x10  mm Final    Multiple pieces received.  Dimensions of the largest piece  reported.    Stone Weight 08/23/2023 198  mg Final    Composition 08/23/2023 Comment   Final    Percentage (Represents the % composition)    Ca Oxalate - Monohydrate, Stone 08/23/2023 30  % Final    Uric Acid, Stone 08/23/2023 70  % Final    Photo 08/23/2023 Comment   Final    Photograph will follow under a separate cover    Comments: 08/23/2023 Comment   Final    Physician questions regarding [facility] Analysis contact  [facility] at:  [phone].    Please note 08/23/2023 Comment   Final    [facility] report will follow via computer, mail or   delivery.    Disclaimer: 08/23/2023 Comment   Final    This test was developed and its performance characteristics  determined by [facility].  It has not been cleared or approved  by the Food and Drug Administration.   Admission on 08/12/2023, Discharged on 08/12/2023   Component Date Value Ref Range Status    Glucose 08/12/2023 80  65 - 99 mg/dL Final    BUN 08/12/2023 18  8 - 23 mg/dL Final    Creatinine 08/12/2023 1.62 (H)  0.76 - 1.27 mg/dL Final    Sodium 08/12/2023 139  136 - 145 mmol/L Final    Potassium 08/12/2023 4.2  3.5 - 5.2 mmol/L Final    Slight hemolysis detected by analyzer. Results may be affected.    Chloride 08/12/2023 103  98 - 107 mmol/L Final    CO2 08/12/2023 25.2  22.0 - 29.0 mmol/L Final    Calcium 08/12/2023 9.3  8.6 - 10.5 mg/dL Final    Total Protein 08/12/2023 7.0  6.0 - 8.5 g/dL Final    Albumin 08/12/2023 4.2  3.5 - 5.2 g/dL Final    ALT (SGPT) 08/12/2023 10  1 - 41 U/L Final    AST (SGOT) 08/12/2023 18  1 - 40 U/L Final    Alkaline Phosphatase 08/12/2023 88  39 - 117 U/L Final    Total Bilirubin 08/12/2023 0.3  0.0 - 1.2 mg/dL Final    Globulin 08/12/2023 2.8  gm/dL Final    A/G Ratio 08/12/2023 1.5  g/dL Final    BUN/Creatinine Ratio 08/12/2023 11.1  7.0 - 25.0 Final    Anion Gap 08/12/2023 10.8  5.0 - 15.0 mmol/L Final    eGFR 08/12/2023 45.1 (L)  >60.0 mL/min/1.73 Final    Color, UA 08/12/2023 Yellow  Yellow, Straw Final    Appearance, UA 08/12/2023 Clear  Clear Final    pH, UA 08/12/2023 6.0  5.0 - 8.0 Final    Specific Gravity, UA 08/12/2023 1.015  1.005 - 1.030 Final    Glucose, UA 08/12/2023 Negative  Negative Final    Ketones, UA 08/12/2023 Trace (A)  Negative Final    Bilirubin, UA 08/12/2023 Negative  Negative Final    Blood, UA 08/12/2023 Moderate (2+) (A)  Negative Final    Protein, UA 08/12/2023 Trace (A)  Negative Final    Leuk Esterase, UA 08/12/2023 Small  (1+) (A)  Negative Final    Nitrite, UA 08/12/2023 Negative  Negative Final    Urobilinogen, UA 08/12/2023 0.2 E.U./dL  0.2 - 1.0 E.U./dL Final    WBC 08/12/2023 9.78  3.40 - 10.80 10*3/mm3 Final    RBC 08/12/2023 5.85 (H)  4.14 - 5.80 10*6/mm3 Final    Hemoglobin 08/12/2023 15.7  13.0 - 17.7 g/dL Final    Hematocrit 08/12/2023 48.3  37.5 - 51.0 % Final    MCV 08/12/2023 82.6  79.0 - 97.0 fL Final    MCH 08/12/2023 26.8  26.6 - 33.0 pg Final    MCHC 08/12/2023 32.5  31.5 - 35.7 g/dL Final    RDW 08/12/2023 16.1 (H)  12.3 - 15.4 % Final    RDW-SD 08/12/2023 47.7  37.0 - 54.0 fl Final    MPV 08/12/2023 11.0  6.0 - 12.0 fL Final    Platelets 08/12/2023 232  140 - 450 10*3/mm3 Final    Neutrophil % 08/12/2023 73.3  42.7 - 76.0 % Final    Lymphocyte % 08/12/2023 18.0 (L)  19.6 - 45.3 % Final    Monocyte % 08/12/2023 5.3  5.0 - 12.0 % Final    Eosinophil % 08/12/2023 2.4  0.3 - 6.2 % Final    Basophil % 08/12/2023 0.7  0.0 - 1.5 % Final    Immature Grans % 08/12/2023 0.3  0.0 - 0.5 % Final    Neutrophils, Absolute 08/12/2023 7.17 (H)  1.70 - 7.00 10*3/mm3 Final    Lymphocytes, Absolute 08/12/2023 1.76  0.70 - 3.10 10*3/mm3 Final    Monocytes, Absolute 08/12/2023 0.52  0.10 - 0.90 10*3/mm3 Final    Eosinophils, Absolute 08/12/2023 0.23  0.00 - 0.40 10*3/mm3 Final    Basophils, Absolute 08/12/2023 0.07  0.00 - 0.20 10*3/mm3 Final    Immature Grans, Absolute 08/12/2023 0.03  0.00 - 0.05 10*3/mm3 Final    nRBC 08/12/2023 0.0  0.0 - 0.2 /100 WBC Final    Extra Tube 08/12/2023 Hold for add-ons.   Final    Auto resulted.    Extra Tube 08/12/2023 hold for add-on   Final    Auto resulted    Extra Tube 08/12/2023 Hold for add-ons.   Final    Auto resulted.    Extra Tube 08/12/2023 Hold for add-ons.   Final    Auto resulted    RBC, UA 08/12/2023 31-50 (A)  None Seen, 0-2 /HPF Final    WBC, UA 08/12/2023 0-2  None Seen, 0-2 /HPF Final    Bacteria, UA 08/12/2023 None Seen  None Seen /HPF Final    Squamous Epithelial Cells, UA  08/12/2023 0-2  None Seen, 0-2 /HPF Final    Hyaline Casts, UA 08/12/2023 None Seen  None Seen /LPF Final    Methodology 08/12/2023 Automated Microscopy   Final        Procedure:       Assessment/Plan:   Renal calculus-we discussed the presence of the stone. We discussed the various therapeutic options available including percutaneous nephrostolithotomy, ureteroscopy and extracorporeal shockwave  lithotripsy.  We discussed the risks of lithotripsy including the passage of stones, the development of a large string of stones in the distal ureter known as Steinstrasse.  In the 3% incidence of that we will need to proceed with a ureteroscopy for obstructing fragments.  Extremely rare incidence of renal hematoma and the significance of this.  We discussed percutaneous nephrostolithotomy and its use as well as the risks and benefits such as the need for postoperative hospitalization, and the risk of damage to the kidney and the remote risk of a nephrectomy.  We also discussed the use of ureteroscopy in the upper tracts.  That this is as a decreased success rate to completely remove the stones on the first option and that very likely a stent will be required requiring an additional procedure for removal.  We discussed the absolute relative indicators for intervention including the presence of sepsis and pain we cannot control ais the primary need for urgent intervention.  We discussed placement of a stent if indicated and the management of the stent as well.  Status post a very successful lithotripsy KUB was reviewed doing well  BPH: Discussed the pathophysiology of BPH and obstruction.  We discussed the static and dynamic effects of BPH as well as using 5 alpha reductase inhibitors versus alpha blockade.  We discussed the indications for transurethral surgery as well and/ or other therapeutic options available including all of the newer techniques.  Has a poor result from your left very dissatisfied          This document  has been electronically signed by MARY CASTILLO MD November 6, 2023 15:27 EST    Dictated Utilizing Dragon Dictation: Part of this note may be an electronic transcription/translation of spoken language to printed text using the Dragon Dictation System.

## 2023-11-07 ENCOUNTER — TELEPHONE (OUTPATIENT)
Dept: CARDIOLOGY | Facility: CLINIC | Age: 73
End: 2023-11-07
Payer: MEDICARE

## 2023-11-07 PROBLEM — R35.0 BENIGN PROSTATIC HYPERPLASIA WITH URINARY FREQUENCY: Status: ACTIVE | Noted: 2023-11-07

## 2023-11-07 PROBLEM — N40.1 BENIGN PROSTATIC HYPERPLASIA WITH URINARY FREQUENCY: Status: ACTIVE | Noted: 2023-11-07

## 2023-11-07 NOTE — TELEPHONE ENCOUNTER
Becca Butler called in who is listed on patient verbal release. She wanted to make sure that he had already had his labs for his heart cath . I checked and patient had already had his labs drawn.

## 2023-11-10 ENCOUNTER — TELEPHONE (OUTPATIENT)
Dept: CARDIOLOGY | Facility: CLINIC | Age: 73
End: 2023-11-10

## 2023-11-10 NOTE — TELEPHONE ENCOUNTER
Caller: CHARLES- CATHERINE RIGGS    Relationship: Provider    Best call back number: [phone]     What orders are you requesting (i.e. lab or imaging): LEFT HEART CATH     In what timeframe would the patient need to come in: 11/13/23    Where will you receive your lab/imaging services: [facility]     Additional notes: CALLER IS NEEDING ORDERS FOR THIS PT, COMING IN ON 11/13/23. PLEASE -[phone]

## 2023-11-13 ENCOUNTER — OUTSIDE FACILITY SERVICE (OUTPATIENT)
Dept: CARDIOLOGY | Facility: CLINIC | Age: 73
End: 2023-11-13
Payer: MEDICARE

## 2023-11-13 RX ORDER — CLOPIDOGREL BISULFATE 75 MG/1
75 TABLET ORAL DAILY
Qty: 90 TABLET | Refills: 3 | Status: SHIPPED | OUTPATIENT
Start: 2023-11-13

## 2023-11-20 ENCOUNTER — OFFICE VISIT (OUTPATIENT)
Dept: CARDIOLOGY | Facility: CLINIC | Age: 73
End: 2023-11-20
Payer: MEDICARE

## 2023-11-20 VITALS
WEIGHT: 203 LBS | BODY MASS INDEX: 30.07 KG/M2 | SYSTOLIC BLOOD PRESSURE: 120 MMHG | OXYGEN SATURATION: 97 % | HEART RATE: 68 BPM | DIASTOLIC BLOOD PRESSURE: 70 MMHG | HEIGHT: 69 IN

## 2023-11-20 DIAGNOSIS — E78.5 DYSLIPIDEMIA: ICD-10-CM

## 2023-11-20 DIAGNOSIS — I10 ESSENTIAL HYPERTENSION: ICD-10-CM

## 2023-11-20 DIAGNOSIS — I25.10 CORONARY ARTERY DISEASE INVOLVING NATIVE CORONARY ARTERY OF NATIVE HEART WITHOUT ANGINA PECTORIS: Primary | ICD-10-CM

## 2023-11-20 NOTE — PROGRESS NOTES
Problem list     Subjective   Ye Butler is a 73 y.o. male     Chief Complaint   Patient presents with    Follow-up     Cath   Problem list  1.  Coronary artery disease  1.1 cardiac catheterization March 2018 because of refractory angina despite negative stress testing demonstrated high-grade RCA disease status post stenting.  Patient underwent stenting ×2 of the circumflex with 70% distal LAD disease and medical management recommended.  1.2 stress test April 2023 with no evidence of ischemia and mildly depressed systolic function of 51%  1.3 cardiac catheterization November 2023 because of medically refractory angina despite negative stress test demonstrated subtotal LAD distal to the diagonal status post stenting x2.  Nonobstructive disease otherwise with patent stent patient had 50% disease involving the circumflex and obtuse marginal with 30 to 50% RCA disease.  Nonobstructive disease otherwise  2.  Accelerated Idioventricular rhythm  2.1 EKG at primary office demonstrated idioventricular rhythm at 90 bpm.  This was prior to catheterization  2.2 event monitor June 2021 with sinus rhythm and occasional PVCs.  No other arrhythmias noted  3.  Hypertension  4..  Dyslipidemia  5.  Type 2 diabetes mellitus  6.  Family history of premature coronary artery disease and a brother with sudden cardiac death       HPI    Patient is a 73-year-old male who presents back to the office for evaluation.  In the past, he continued to complain of significant angina despite negative stress test and failure to improve symptomatically on antianginal therapy.  Catheterization demonstrated a subtotal LAD and he underwent stenting x2.  He feels significant improvement.    In fact, he describes to me not having the pain like what he felt previous to catheterization.  He has a degree of dyspnea that appears to be mild.  He does not describe progressive shortness of breath.  No PND or orthopnea.    He does not describe palpitations nor  does he complain of dysrhythmic symptoms.  He is stable otherwise.      Current Outpatient Medications on File Prior to Visit   Medication Sig Dispense Refill    amLODIPine (NORVASC) 5 MG tablet Take 1 tablet by mouth 2 (Two) Times a Day. 60 tablet 5    aspirin 81 MG tablet Take 1 tablet by mouth Daily. 30 tablet 11    clopidogrel (PLAVIX) 75 MG tablet Take 1 tablet by mouth Daily. 90 tablet 3    Docusate Sodium 100 MG capsule Take 1 tablet by mouth As Needed for Constipation.      glimepiride (AMARYL) 4 MG tablet Take 1 tablet by mouth Daily.      isosorbide mononitrate (IMDUR) 30 MG 24 hr tablet Take 1 tablet by mouth Every Morning. 30 tablet 11    levothyroxine (SYNTHROID, LEVOTHROID) 50 MCG tablet Take 1 tablet by mouth Daily.  0    Linzess 145 MCG capsule capsule       lisinopril (PRINIVIL,ZESTRIL) 20 MG tablet Take 1 tablet by mouth 2 (Two) Times a Day. 60 tablet 5    memantine (NAMENDA) 5 MG tablet Take 1 tablet by mouth Daily.      metFORMIN (GLUCOPHAGE) 500 MG tablet Take 1 tablet by mouth 2 (Two) Times a Day With Meals.  0    metoprolol tartrate (LOPRESSOR) 25 MG tablet       montelukast (SINGULAIR) 10 MG tablet Take 1 tablet by mouth Every Night.      nitroglycerin (NITROSTAT) 0.4 MG SL tablet 1 under the tongue as needed for angina, may repeat q5mins for up three doses 100 tablet 11    NON FORMULARY Morphine pump      ondansetron ODT (ZOFRAN-ODT) 4 MG disintegrating tablet Place 1 tablet on the tongue Every 6 (Six) Hours As Needed for Nausea or Vomiting. 10 tablet 0    pantoprazole (PROTONIX) 40 MG EC tablet       pravastatin (PRAVACHOL) 40 MG tablet       sertraline (ZOLOFT) 25 MG tablet Take 1 tablet by mouth Daily.      tamsulosin (FLOMAX) 0.4 MG capsule 24 hr capsule 1 capsule Daily.      Testosterone Cypionate (DEPOTESTOTERONE CYPIONATE) 200 MG/ML injection Inject 1 mL into the appropriate muscle as directed by prescriber Every 21 (Twenty-One) Days.      traZODone (DESYREL) 50 MG tablet        Zolpidem Tartrate 5 MG/ACT solution Take  by mouth.       No current facility-administered medications on file prior to visit.       Claritin [loratadine] and Codeine    Past Medical History:   Diagnosis Date    Appendicitis     Arthritis     COPD (chronic obstructive pulmonary disease)     Coronary artery disease     COVID-19     Diabetes mellitus     Disease of thyroid gland     Diverticulitis     Dizzy     Elevated cholesterol     Emphysema lung     Emphysema lung     Emphysema lung     GERD (gastroesophageal reflux disease)     Hiatal hernia     Hyperlipidemia     Hypertension     Kidney stone     MI (myocardial infarction)     PONV (postoperative nausea and vomiting)     Stomach ulcer     Stroke 2018       Social History     Socioeconomic History    Marital status:    Tobacco Use    Smoking status: Former     Years: 20     Types: Cigarettes     Quit date:      Years since quittin.9    Smokeless tobacco: Never    Tobacco comments:     quit 20+ yrs ago    Vaping Use    Vaping Use: Never used   Substance and Sexual Activity    Alcohol use: No     Comment: former use 20 + yrs ago     Drug use: No    Sexual activity: Defer       Family History   Problem Relation Age of Onset    Cancer Mother     Heart disease Father     Stroke Father        Review of Systems   Constitutional: Negative.    HENT: Negative.     Eyes: Negative.    Respiratory:  Positive for shortness of breath. Negative for apnea, cough, chest tightness and wheezing.    Cardiovascular: Negative.  Negative for chest pain, palpitations and leg swelling.   Gastrointestinal: Negative.  Negative for blood in stool.   Endocrine: Negative.    Genitourinary: Negative.  Negative for hematuria.   Musculoskeletal: Negative.    Skin: Negative.  Negative for color change, rash and wound.   Neurological:  Positive for dizziness. Negative for syncope, weakness, light-headedness and headaches.   Hematological: Negative.  Does not bruise/bleed  "easily.   Psychiatric/Behavioral: Negative.  Negative for sleep disturbance.        Objective   Vitals:    11/20/23 0848   BP: 120/70   Pulse: 68   SpO2: 97%   Weight: 92.1 kg (203 lb)   Height: 175.3 cm (69\")      /70   Pulse 68   Ht 175.3 cm (69\")   Wt 92.1 kg (203 lb)   SpO2 97%   BMI 29.98 kg/m²     Lab Results (most recent)       None            Physical Exam  Vitals and nursing note reviewed.   Constitutional:       General: He is not in acute distress.     Appearance: Normal appearance. He is well-developed.   HENT:      Head: Normocephalic and atraumatic.   Eyes:      General: No scleral icterus.        Right eye: No discharge.         Left eye: No discharge.      Conjunctiva/sclera: Conjunctivae normal.   Neck:      Vascular: No carotid bruit.   Cardiovascular:      Rate and Rhythm: Normal rate and regular rhythm.      Heart sounds: Normal heart sounds. No murmur heard.     No friction rub. No gallop.   Pulmonary:      Effort: Pulmonary effort is normal. No respiratory distress.      Breath sounds: Normal breath sounds. No wheezing or rales.   Chest:      Chest wall: No tenderness.   Musculoskeletal:      Right lower leg: No edema.      Left lower leg: No edema.   Skin:     General: Skin is warm and dry.      Coloration: Skin is not pale.      Findings: No erythema or rash.   Neurological:      Mental Status: He is alert and oriented to person, place, and time.      Cranial Nerves: No cranial nerve deficit.   Psychiatric:         Behavior: Behavior normal.         Procedure   Procedures       Assessment & Plan     Problems Addressed this Visit          Cardiac and Vasculature    Essential hypertension    Coronary artery disease involving native coronary artery of native heart without angina pectoris - Primary    Dyslipidemia     Diagnoses         Codes Comments    Coronary artery disease involving native coronary artery of native heart without angina pectoris    -  Primary ICD-10-CM: " I25.10  ICD-9-CM: 414.01     Essential hypertension     ICD-10-CM: I10  ICD-9-CM: 401.9     Dyslipidemia     ICD-10-CM: E78.5  ICD-9-CM: 272.4           Recommendation  1.  Patient is a 73-year-old male who presents to the office to be evaluated.  Patient has history of coronary disease but has had improvement status post stenting.  He feels remarkably improved.  For now, we will continue dual antiplatelet and statin therapy.    2.  I am requesting laboratories of his recent lipid parameters.  If his cholesterol is well controlled on pravastatin, we will continue it but we may consider a higher dose statin for risk factor modification.  His diabetes is being managed per primary.    3.  Patient's blood pressure currently stable on current medical regimen.  He feels improved.  For now, nothing further we will see him back for follow-up in 6 months or sooner if needed.  Follow-up with primary as scheduled         Patient did not bring med list or medicine bottles to appointment, med list has been reviewed and updated based on patient's knowledge of their meds.      Advance Care Planning   ACP discussion was declined by the patient. Patient does not have an advance directive, declines further assistance.              Electronically signed by:

## 2023-11-20 NOTE — LETTER
November 20, 2023       No Recipients    Patient: eY Butler   YOB: 1950   Date of Visit: 11/20/2023       Dear Rose Stoll MD    Ye Butler was in my office today. Below is a copy of my note.    If you have questions, please do not hesitate to call me. I look forward to following Ye along with you.         Sincerely,        THONY Newberry        CC:   No Recipients    Problem list     Subjective  Ye Butler is a 73 y.o. male     Chief Complaint   Patient presents with   • Follow-up     Cath   Problem list  1.  Coronary artery disease  1.1 cardiac catheterization March 2018 because of refractory angina despite negative stress testing demonstrated high-grade RCA disease status post stenting.  Patient underwent stenting ×2 of the circumflex with 70% distal LAD disease and medical management recommended.  1.2 stress test April 2023 with no evidence of ischemia and mildly depressed systolic function of 51%  1.3 cardiac catheterization November 2023 because of medically refractory angina despite negative stress test demonstrated subtotal LAD distal to the diagonal status post stenting x2.  Nonobstructive disease otherwise with patent stent patient had 50% disease involving the circumflex and obtuse marginal with 30 to 50% RCA disease.  Nonobstructive disease otherwise  2.  Accelerated Idioventricular rhythm  2.1 EKG at primary office demonstrated idioventricular rhythm at 90 bpm.  This was prior to catheterization  2.2 event monitor June 2021 with sinus rhythm and occasional PVCs.  No other arrhythmias noted  3.  Hypertension  4..  Dyslipidemia  5.  Type 2 diabetes mellitus  6.  Family history of premature coronary artery disease and a brother with sudden cardiac death       HPI    Patient is a 73-year-old male who presents back to the office for evaluation.  In the past, he continued to complain of significant angina despite negative stress test and failure to improve  symptomatically on antianginal therapy.  Catheterization demonstrated a subtotal LAD and he underwent stenting x2.  He feels significant improvement.    In fact, he describes to me not having the pain like what he felt previous to catheterization.  He has a degree of dyspnea that appears to be mild.  He does not describe progressive shortness of breath.  No PND or orthopnea.    He does not describe palpitations nor does he complain of dysrhythmic symptoms.  He is stable otherwise.      Current Outpatient Medications on File Prior to Visit   Medication Sig Dispense Refill   • amLODIPine (NORVASC) 5 MG tablet Take 1 tablet by mouth 2 (Two) Times a Day. 60 tablet 5   • aspirin 81 MG tablet Take 1 tablet by mouth Daily. 30 tablet 11   • clopidogrel (PLAVIX) 75 MG tablet Take 1 tablet by mouth Daily. 90 tablet 3   • Docusate Sodium 100 MG capsule Take 1 tablet by mouth As Needed for Constipation.     • glimepiride (AMARYL) 4 MG tablet Take 1 tablet by mouth Daily.     • isosorbide mononitrate (IMDUR) 30 MG 24 hr tablet Take 1 tablet by mouth Every Morning. 30 tablet 11   • levothyroxine (SYNTHROID, LEVOTHROID) 50 MCG tablet Take 1 tablet by mouth Daily.  0   • Linzess 145 MCG capsule capsule      • lisinopril (PRINIVIL,ZESTRIL) 20 MG tablet Take 1 tablet by mouth 2 (Two) Times a Day. 60 tablet 5   • memantine (NAMENDA) 5 MG tablet Take 1 tablet by mouth Daily.     • metFORMIN (GLUCOPHAGE) 500 MG tablet Take 1 tablet by mouth 2 (Two) Times a Day With Meals.  0   • metoprolol tartrate (LOPRESSOR) 25 MG tablet      • montelukast (SINGULAIR) 10 MG tablet Take 1 tablet by mouth Every Night.     • nitroglycerin (NITROSTAT) 0.4 MG SL tablet 1 under the tongue as needed for angina, may repeat q5mins for up three doses 100 tablet 11   • NON FORMULARY Morphine pump     • ondansetron ODT (ZOFRAN-ODT) 4 MG disintegrating tablet Place 1 tablet on the tongue Every 6 (Six) Hours As Needed for Nausea or Vomiting. 10 tablet 0   •  pantoprazole (PROTONIX) 40 MG EC tablet      • pravastatin (PRAVACHOL) 40 MG tablet      • sertraline (ZOLOFT) 25 MG tablet Take 1 tablet by mouth Daily.     • tamsulosin (FLOMAX) 0.4 MG capsule 24 hr capsule 1 capsule Daily.     • Testosterone Cypionate (DEPOTESTOTERONE CYPIONATE) 200 MG/ML injection Inject 1 mL into the appropriate muscle as directed by prescriber Every 21 (Twenty-One) Days.     • traZODone (DESYREL) 50 MG tablet      • Zolpidem Tartrate 5 MG/ACT solution Take  by mouth.       No current facility-administered medications on file prior to visit.       Claritin [loratadine] and Codeine    Past Medical History:   Diagnosis Date   • Appendicitis    • Arthritis    • COPD (chronic obstructive pulmonary disease)    • Coronary artery disease    • COVID-19    • Diabetes mellitus    • Disease of thyroid gland    • Diverticulitis    • Dizzy    • Elevated cholesterol    • Emphysema lung    • Emphysema lung    • Emphysema lung    • GERD (gastroesophageal reflux disease)    • Hiatal hernia    • Hyperlipidemia    • Hypertension    • Kidney stone    • MI (myocardial infarction)    • PONV (postoperative nausea and vomiting)    • Stomach ulcer    • Stroke 2018       Social History     Socioeconomic History   • Marital status:    Tobacco Use   • Smoking status: Former     Years: 20     Types: Cigarettes     Quit date: 2000     Years since quittin.9   • Smokeless tobacco: Never   • Tobacco comments:     quit 20+ yrs ago    Vaping Use   • Vaping Use: Never used   Substance and Sexual Activity   • Alcohol use: No     Comment: former use 20 + yrs ago    • Drug use: No   • Sexual activity: Defer       Family History   Problem Relation Age of Onset   • Cancer Mother    • Heart disease Father    • Stroke Father        Review of Systems   Constitutional: Negative.    HENT: Negative.     Eyes: Negative.    Respiratory:  Positive for shortness of breath. Negative for apnea, cough, chest tightness and wheezing.  "   Cardiovascular: Negative.  Negative for chest pain, palpitations and leg swelling.   Gastrointestinal: Negative.  Negative for blood in stool.   Endocrine: Negative.    Genitourinary: Negative.  Negative for hematuria.   Musculoskeletal: Negative.    Skin: Negative.  Negative for color change, rash and wound.   Neurological:  Positive for dizziness. Negative for syncope, weakness, light-headedness and headaches.   Hematological: Negative.  Does not bruise/bleed easily.   Psychiatric/Behavioral: Negative.  Negative for sleep disturbance.        Objective  Vitals:    11/20/23 0848   BP: 120/70   Pulse: 68   SpO2: 97%   Weight: 92.1 kg (203 lb)   Height: 175.3 cm (69\")      /70   Pulse 68   Ht 175.3 cm (69\")   Wt 92.1 kg (203 lb)   SpO2 97%   BMI 29.98 kg/m²     Lab Results (most recent)       None            Physical Exam  Vitals and nursing note reviewed.   Constitutional:       General: He is not in acute distress.     Appearance: Normal appearance. He is well-developed.   HENT:      Head: Normocephalic and atraumatic.   Eyes:      General: No scleral icterus.        Right eye: No discharge.         Left eye: No discharge.      Conjunctiva/sclera: Conjunctivae normal.   Neck:      Vascular: No carotid bruit.   Cardiovascular:      Rate and Rhythm: Normal rate and regular rhythm.      Heart sounds: Normal heart sounds. No murmur heard.     No friction rub. No gallop.   Pulmonary:      Effort: Pulmonary effort is normal. No respiratory distress.      Breath sounds: Normal breath sounds. No wheezing or rales.   Chest:      Chest wall: No tenderness.   Musculoskeletal:      Right lower leg: No edema.      Left lower leg: No edema.   Skin:     General: Skin is warm and dry.      Coloration: Skin is not pale.      Findings: No erythema or rash.   Neurological:      Mental Status: He is alert and oriented to person, place, and time.      Cranial Nerves: No cranial nerve deficit.   Psychiatric:         " Behavior: Behavior normal.         Procedure  Procedures       Assessment & Plan    Problems Addressed this Visit          Cardiac and Vasculature    Essential hypertension    Coronary artery disease involving native coronary artery of native heart without angina pectoris - Primary    Dyslipidemia     Diagnoses         Codes Comments    Coronary artery disease involving native coronary artery of native heart without angina pectoris    -  Primary ICD-10-CM: I25.10  ICD-9-CM: 414.01     Essential hypertension     ICD-10-CM: I10  ICD-9-CM: 401.9     Dyslipidemia     ICD-10-CM: E78.5  ICD-9-CM: 272.4           Recommendation  1.  Patient is a 73-year-old male who presents to the office to be evaluated.  Patient has history of coronary disease but has had improvement status post stenting.  He feels remarkably improved.  For now, we will continue dual antiplatelet and statin therapy.    2.  I am requesting laboratories of his recent lipid parameters.  If his cholesterol is well controlled on pravastatin, we will continue it but we may consider a higher dose statin for risk factor modification.  His diabetes is being managed per primary.    3.  Patient's blood pressure currently stable on current medical regimen.  He feels improved.  For now, nothing further we will see him back for follow-up in 6 months or sooner if needed.  Follow-up with primary as scheduled         Patient did not bring med list or medicine bottles to appointment, med list has been reviewed and updated based on patient's knowledge of their meds.      Advance Care Planning  ACP discussion was declined by the patient. Patient does not have an advance directive, declines further assistance.              Electronically signed by:

## 2023-11-28 RX ORDER — PRAVASTATIN SODIUM 40 MG
40 TABLET ORAL DAILY
Qty: 90 TABLET | Refills: 0 | Status: SHIPPED | OUTPATIENT
Start: 2023-11-28

## 2023-12-26 RX ORDER — AMLODIPINE BESYLATE 5 MG/1
5 TABLET ORAL 2 TIMES DAILY
Qty: 180 TABLET | Refills: 5 | Status: SHIPPED | OUTPATIENT
Start: 2023-12-26

## 2024-02-23 RX ORDER — PRAVASTATIN SODIUM 40 MG
40 TABLET ORAL DAILY
Qty: 90 TABLET | Refills: 0 | Status: SHIPPED | OUTPATIENT
Start: 2024-02-23

## 2024-05-09 ENCOUNTER — OFFICE VISIT (OUTPATIENT)
Dept: UROLOGY | Facility: CLINIC | Age: 74
End: 2024-05-09
Payer: MEDICARE

## 2024-05-09 ENCOUNTER — HOSPITAL ENCOUNTER (OUTPATIENT)
Dept: GENERAL RADIOLOGY | Facility: HOSPITAL | Age: 74
Discharge: HOME | End: 2024-05-09
Admitting: UROLOGY
Payer: MEDICARE

## 2024-05-09 VITALS
WEIGHT: 204.8 LBS | SYSTOLIC BLOOD PRESSURE: 155 MMHG | DIASTOLIC BLOOD PRESSURE: 83 MMHG | HEART RATE: 58 BPM | HEIGHT: 69 IN | BODY MASS INDEX: 30.33 KG/M2

## 2024-05-09 DIAGNOSIS — N20.0 LEFT RENAL STONE: Primary | ICD-10-CM

## 2024-05-09 DIAGNOSIS — N20.0 LEFT RENAL STONE: ICD-10-CM

## 2024-05-09 PROCEDURE — 1159F MED LIST DOCD IN RCRD: CPT | Performed by: UROLOGY

## 2024-05-09 PROCEDURE — 3077F SYST BP >= 140 MM HG: CPT | Performed by: UROLOGY

## 2024-05-09 PROCEDURE — 1160F RVW MEDS BY RX/DR IN RCRD: CPT | Performed by: UROLOGY

## 2024-05-09 PROCEDURE — 74018 RADEX ABDOMEN 1 VIEW: CPT

## 2024-05-09 PROCEDURE — 74018 RADEX ABDOMEN 1 VIEW: CPT | Performed by: RADIOLOGY

## 2024-05-09 PROCEDURE — 3079F DIAST BP 80-89 MM HG: CPT | Performed by: UROLOGY

## 2024-05-09 PROCEDURE — 99213 OFFICE O/P EST LOW 20 MIN: CPT | Performed by: UROLOGY

## 2024-05-20 ENCOUNTER — OFFICE VISIT (OUTPATIENT)
Dept: CARDIOLOGY | Facility: CLINIC | Age: 74
End: 2024-05-20
Payer: MEDICARE

## 2024-05-20 VITALS
DIASTOLIC BLOOD PRESSURE: 72 MMHG | HEIGHT: 69 IN | BODY MASS INDEX: 30.24 KG/M2 | HEART RATE: 70 BPM | SYSTOLIC BLOOD PRESSURE: 129 MMHG | OXYGEN SATURATION: 96 %

## 2024-05-20 DIAGNOSIS — I10 ESSENTIAL HYPERTENSION: ICD-10-CM

## 2024-05-20 DIAGNOSIS — I25.10 CORONARY ARTERY DISEASE INVOLVING NATIVE CORONARY ARTERY OF NATIVE HEART WITHOUT ANGINA PECTORIS: Primary | ICD-10-CM

## 2024-05-20 DIAGNOSIS — E78.5 DYSLIPIDEMIA: ICD-10-CM

## 2024-05-20 PROCEDURE — 3078F DIAST BP <80 MM HG: CPT | Performed by: PHYSICIAN ASSISTANT

## 2024-05-20 PROCEDURE — 99213 OFFICE O/P EST LOW 20 MIN: CPT | Performed by: PHYSICIAN ASSISTANT

## 2024-05-20 PROCEDURE — 3074F SYST BP LT 130 MM HG: CPT | Performed by: PHYSICIAN ASSISTANT

## 2024-05-20 RX ORDER — FAMOTIDINE 40 MG/1
40 TABLET, FILM COATED ORAL DAILY
COMMUNITY

## 2024-05-20 NOTE — PROGRESS NOTES
Problem list     Subjective   Ye Butler is a 74 y.o. male     Chief Complaint   Patient presents with    Follow-up     CAD   Problem list  1.  Coronary artery disease  1.1 cardiac catheterization March 2018 because of refractory angina despite negative stress testing demonstrated high-grade RCA disease status post stenting.  Patient underwent stenting ×2 of the circumflex with 70% distal LAD disease and medical management recommended.  1.2 stress test April 2023 with no evidence of ischemia and mildly depressed systolic function of 51%  1.3 cardiac catheterization November 2023 because of medically refractory angina despite negative stress test demonstrated subtotal LAD distal to the diagonal status post stenting x2.  Nonobstructive disease otherwise with patent stent patient had 50% disease involving the circumflex and obtuse marginal with 30 to 50% RCA disease.  Nonobstructive disease otherwise  2.  Accelerated Idioventricular rhythm  2.1 EKG at primary office demonstrated idioventricular rhythm at 90 bpm.  This was prior to catheterization  2.2 event monitor June 2021 with sinus rhythm and occasional PVCs.  No other arrhythmias noted  3.  Hypertension  4..  Dyslipidemia  5.  Type 2 diabetes mellitus  6.  Family history of premature coronary artery disease and a brother with sudden cardiac death    HPI    Patient is a 74-year-old male presenting back to the office for routine cardiac assessment.  Patient feels markedly well.  He does not describe any chest pain or pressure at all.  No complaints of dyspnea.  No PND or orthopnea.    He does not describe palpitating nor does he complain of dysrhythmic symptoms.  Overall, patient appears to be doing clinically well.      Current Outpatient Medications on File Prior to Visit   Medication Sig Dispense Refill    amLODIPine (NORVASC) 5 MG tablet Take 1 tablet by mouth 2 (Two) Times a Day. 180 tablet 5    aspirin 81 MG tablet Take 1 tablet by mouth Daily. 30 tablet  11    clopidogrel (PLAVIX) 75 MG tablet Take 1 tablet by mouth Daily. 90 tablet 3    Docusate Sodium 100 MG capsule Take 1 tablet by mouth As Needed for Constipation.      famotidine (PEPCID) 40 MG tablet Take 1 tablet by mouth Daily.      glimepiride (AMARYL) 4 MG tablet Take 1 tablet by mouth Daily.      isosorbide mononitrate (IMDUR) 30 MG 24 hr tablet Take 1 tablet by mouth Every Morning. 30 tablet 11    levothyroxine (SYNTHROID, LEVOTHROID) 50 MCG tablet Take 1 tablet by mouth Daily.  0    lisinopril (PRINIVIL,ZESTRIL) 20 MG tablet Take 1 tablet by mouth 2 (Two) Times a Day. 60 tablet 5    memantine (NAMENDA) 5 MG tablet Take 1 tablet by mouth Daily.      metFORMIN (GLUCOPHAGE) 500 MG tablet Take 1 tablet by mouth 2 (Two) Times a Day With Meals.  0    metoprolol tartrate (LOPRESSOR) 25 MG tablet 2 (Two) Times a Day.      montelukast (SINGULAIR) 10 MG tablet Take 1 tablet by mouth Every Night.      nitroglycerin (NITROSTAT) 0.4 MG SL tablet 1 under the tongue as needed for angina, may repeat q5mins for up three doses 100 tablet 11    NON FORMULARY Morphine pump      ondansetron ODT (ZOFRAN-ODT) 4 MG disintegrating tablet Place 1 tablet on the tongue Every 6 (Six) Hours As Needed for Nausea or Vomiting. 10 tablet 0    pantoprazole (PROTONIX) 40 MG EC tablet       pravastatin (PRAVACHOL) 40 MG tablet TAKE 1 TABLET BY MOUTH DAILY 90 tablet 0    sertraline (ZOLOFT) 25 MG tablet Take 1 tablet by mouth Daily.      tamsulosin (FLOMAX) 0.4 MG capsule 24 hr capsule 1 capsule Daily.      Testosterone Cypionate (DEPOTESTOTERONE CYPIONATE) 200 MG/ML injection Inject 1 mL into the appropriate muscle as directed by prescriber Every 21 (Twenty-One) Days.      traZODone (DESYREL) 50 MG tablet       Zolpidem Tartrate 5 MG/ACT solution Take  by mouth.       No current facility-administered medications on file prior to visit.       Claritin [loratadine] and Codeine    Past Medical History:   Diagnosis Date    Appendicitis      Arthritis     COPD (chronic obstructive pulmonary disease)     Coronary artery disease     COVID-19     Diabetes mellitus     Disease of thyroid gland     Diverticulitis     Dizzy     Elevated cholesterol     Emphysema lung     Emphysema lung     Emphysema lung     GERD (gastroesophageal reflux disease)     Hiatal hernia     Hyperlipidemia     Hypertension     Kidney stone     MI (myocardial infarction)     PONV (postoperative nausea and vomiting)     Stomach ulcer     Stroke 2018       Social History     Socioeconomic History    Marital status:    Tobacco Use    Smoking status: Former     Current packs/day: 0.00     Types: Cigarettes     Start date:      Quit date: 2000     Years since quittin.4    Smokeless tobacco: Never    Tobacco comments:     quit 20+ yrs ago    Vaping Use    Vaping status: Never Used   Substance and Sexual Activity    Alcohol use: No     Comment: former use 20 + yrs ago     Drug use: No    Sexual activity: Defer       Family History   Problem Relation Age of Onset    Cancer Mother     Heart disease Father     Stroke Father        Review of Systems   Constitutional:  Negative for activity change, appetite change, chills, fatigue and fever.   HENT: Negative.  Negative for congestion, sinus pressure and sinus pain.    Eyes: Negative.  Negative for visual disturbance.   Respiratory:  Negative for apnea, cough, chest tightness, shortness of breath and wheezing.    Cardiovascular: Negative.  Negative for chest pain, palpitations and leg swelling.   Gastrointestinal: Negative.  Negative for blood in stool.   Endocrine: Negative.  Negative for cold intolerance and heat intolerance.   Genitourinary: Negative.  Negative for hematuria.   Musculoskeletal: Negative.  Negative for gait problem.   Skin: Negative.  Negative for color change, rash and wound.   Allergic/Immunologic: Negative.  Negative for environmental allergies and food allergies.   Neurological:  Negative for dizziness,  "syncope, weakness, light-headedness and numbness.   Hematological:  Bruises/bleeds easily.   Psychiatric/Behavioral: Negative.  Negative for sleep disturbance.        Objective   Vitals:    05/20/24 1438   BP: 129/72   BP Location: Left arm   Patient Position: Sitting   Cuff Size: Adult   Pulse: 70   SpO2: 96%   Height: 175.3 cm (69\")      /72 (BP Location: Left arm, Patient Position: Sitting, Cuff Size: Adult)   Pulse 70   Ht 175.3 cm (69\")   SpO2 96%   BMI 30.24 kg/m²     Lab Results (most recent)       None            Physical Exam  Vitals and nursing note reviewed.   Constitutional:       General: He is not in acute distress.     Appearance: Normal appearance. He is well-developed.   HENT:      Head: Normocephalic and atraumatic.   Eyes:      General: No scleral icterus.        Right eye: No discharge.         Left eye: No discharge.      Conjunctiva/sclera: Conjunctivae normal.   Neck:      Vascular: No carotid bruit.   Cardiovascular:      Rate and Rhythm: Normal rate and regular rhythm.      Heart sounds: Normal heart sounds. No murmur heard.     No friction rub. No gallop.   Pulmonary:      Effort: Pulmonary effort is normal. No respiratory distress.      Breath sounds: Normal breath sounds. No wheezing or rales.   Chest:      Chest wall: No tenderness.   Musculoskeletal:      Right lower leg: No edema.      Left lower leg: No edema.   Skin:     General: Skin is warm and dry.      Coloration: Skin is not pale.      Findings: No erythema or rash.   Neurological:      Mental Status: He is alert and oriented to person, place, and time.      Cranial Nerves: No cranial nerve deficit.   Psychiatric:         Behavior: Behavior normal.         Procedure   Procedures       Assessment & Plan     Problems Addressed this Visit          Cardiac and Vasculature    Essential hypertension    Coronary artery disease involving native coronary artery of native heart without angina pectoris - Primary    Dyslipidemia "     Diagnoses         Codes Comments    Coronary artery disease involving native coronary artery of native heart without angina pectoris    -  Primary ICD-10-CM: I25.10  ICD-9-CM: 414.01     Essential hypertension     ICD-10-CM: I10  ICD-9-CM: 401.9     Dyslipidemia     ICD-10-CM: E78.5  ICD-9-CM: 272.4           Recommendations  1.  Patient is a 74-year-old male that presents back for routine assessment.  Patient has felt remarkably improved since his last catheterization.  Patient has no angina no anginal symptoms.  In the setting, we will continue to monitor and proceed with risk factor modification.    2.  Patient with baseline hypertension doing well medical regimen.  I will make no changes.    3.  Patient with dyslipidemia on pravastatin.  Will continue.  He has labs involving his lipids and diabetes, are being managed by primary.  We recommend an LDL goal less than 70.    4.  Otherwise, we will see patient back for follow-up in 6 months or sooner if needed.  Follow-up with primary as scheduled.           Patient did not bring med list or medicine bottles to appointment, med list has been reviewed and updated based on patient's knowledge of their meds.      Advance Care Planning   ACP discussion was declined by the patient. Patient does not have an advance directive, declines further assistance.        Electronically signed by:

## 2024-05-20 NOTE — LETTER
May 23, 2024       No Recipients    Patient: Ye Butler   YOB: 1950   Date of Visit: 5/20/2024       Dear Rose Stoll MD    Ye Butler was in my office today. Below is a copy of my note.    If you have questions, please do not hesitate to call me. I look forward to following Ye along with you.         Sincerely,        THONY Newberry        CC:   No Recipients    Problem list     Subjective  Ye Butler is a 74 y.o. male     Chief Complaint   Patient presents with   • Follow-up     CAD   Problem list  1.  Coronary artery disease  1.1 cardiac catheterization March 2018 because of refractory angina despite negative stress testing demonstrated high-grade RCA disease status post stenting.  Patient underwent stenting ×2 of the circumflex with 70% distal LAD disease and medical management recommended.  1.2 stress test April 2023 with no evidence of ischemia and mildly depressed systolic function of 51%  1.3 cardiac catheterization November 2023 because of medically refractory angina despite negative stress test demonstrated subtotal LAD distal to the diagonal status post stenting x2.  Nonobstructive disease otherwise with patent stent patient had 50% disease involving the circumflex and obtuse marginal with 30 to 50% RCA disease.  Nonobstructive disease otherwise  2.  Accelerated Idioventricular rhythm  2.1 EKG at primary office demonstrated idioventricular rhythm at 90 bpm.  This was prior to catheterization  2.2 event monitor June 2021 with sinus rhythm and occasional PVCs.  No other arrhythmias noted  3.  Hypertension  4..  Dyslipidemia  5.  Type 2 diabetes mellitus  6.  Family history of premature coronary artery disease and a brother with sudden cardiac death    HPI    Patient is a 74-year-old male presenting back to the office for routine cardiac assessment.  Patient feels markedly well.  He does not describe any chest pain or pressure at all.  No complaints of  dyspnea.  No PND or orthopnea.    He does not describe palpitating nor does he complain of dysrhythmic symptoms.  Overall, patient appears to be doing clinically well.      Current Outpatient Medications on File Prior to Visit   Medication Sig Dispense Refill   • amLODIPine (NORVASC) 5 MG tablet Take 1 tablet by mouth 2 (Two) Times a Day. 180 tablet 5   • aspirin 81 MG tablet Take 1 tablet by mouth Daily. 30 tablet 11   • clopidogrel (PLAVIX) 75 MG tablet Take 1 tablet by mouth Daily. 90 tablet 3   • Docusate Sodium 100 MG capsule Take 1 tablet by mouth As Needed for Constipation.     • famotidine (PEPCID) 40 MG tablet Take 1 tablet by mouth Daily.     • glimepiride (AMARYL) 4 MG tablet Take 1 tablet by mouth Daily.     • isosorbide mononitrate (IMDUR) 30 MG 24 hr tablet Take 1 tablet by mouth Every Morning. 30 tablet 11   • levothyroxine (SYNTHROID, LEVOTHROID) 50 MCG tablet Take 1 tablet by mouth Daily.  0   • lisinopril (PRINIVIL,ZESTRIL) 20 MG tablet Take 1 tablet by mouth 2 (Two) Times a Day. 60 tablet 5   • memantine (NAMENDA) 5 MG tablet Take 1 tablet by mouth Daily.     • metFORMIN (GLUCOPHAGE) 500 MG tablet Take 1 tablet by mouth 2 (Two) Times a Day With Meals.  0   • metoprolol tartrate (LOPRESSOR) 25 MG tablet 2 (Two) Times a Day.     • montelukast (SINGULAIR) 10 MG tablet Take 1 tablet by mouth Every Night.     • nitroglycerin (NITROSTAT) 0.4 MG SL tablet 1 under the tongue as needed for angina, may repeat q5mins for up three doses 100 tablet 11   • NON FORMULARY Morphine pump     • ondansetron ODT (ZOFRAN-ODT) 4 MG disintegrating tablet Place 1 tablet on the tongue Every 6 (Six) Hours As Needed for Nausea or Vomiting. 10 tablet 0   • pantoprazole (PROTONIX) 40 MG EC tablet      • pravastatin (PRAVACHOL) 40 MG tablet TAKE 1 TABLET BY MOUTH DAILY 90 tablet 0   • sertraline (ZOLOFT) 25 MG tablet Take 1 tablet by mouth Daily.     • tamsulosin (FLOMAX) 0.4 MG capsule 24 hr capsule 1 capsule Daily.     •  Testosterone Cypionate (DEPOTESTOTERONE CYPIONATE) 200 MG/ML injection Inject 1 mL into the appropriate muscle as directed by prescriber Every 21 (Twenty-One) Days.     • traZODone (DESYREL) 50 MG tablet      • Zolpidem Tartrate 5 MG/ACT solution Take  by mouth.       No current facility-administered medications on file prior to visit.       Claritin [loratadine] and Codeine    Past Medical History:   Diagnosis Date   • Appendicitis    • Arthritis    • COPD (chronic obstructive pulmonary disease)    • Coronary artery disease    • COVID-19    • Diabetes mellitus    • Disease of thyroid gland    • Diverticulitis    • Dizzy    • Elevated cholesterol    • Emphysema lung    • Emphysema lung    • Emphysema lung    • GERD (gastroesophageal reflux disease)    • Hiatal hernia    • Hyperlipidemia    • Hypertension    • Kidney stone    • MI (myocardial infarction)    • PONV (postoperative nausea and vomiting)    • Stomach ulcer    • Stroke 2018       Social History     Socioeconomic History   • Marital status:    Tobacco Use   • Smoking status: Former     Current packs/day: 0.00     Types: Cigarettes     Start date:      Quit date: 2000     Years since quittin.4   • Smokeless tobacco: Never   • Tobacco comments:     quit 20+ yrs ago    Vaping Use   • Vaping status: Never Used   Substance and Sexual Activity   • Alcohol use: No     Comment: former use 20 + yrs ago    • Drug use: No   • Sexual activity: Defer       Family History   Problem Relation Age of Onset   • Cancer Mother    • Heart disease Father    • Stroke Father        Review of Systems   Constitutional:  Negative for activity change, appetite change, chills, fatigue and fever.   HENT: Negative.  Negative for congestion, sinus pressure and sinus pain.    Eyes: Negative.  Negative for visual disturbance.   Respiratory:  Negative for apnea, cough, chest tightness, shortness of breath and wheezing.    Cardiovascular: Negative.  Negative for chest  "pain, palpitations and leg swelling.   Gastrointestinal: Negative.  Negative for blood in stool.   Endocrine: Negative.  Negative for cold intolerance and heat intolerance.   Genitourinary: Negative.  Negative for hematuria.   Musculoskeletal: Negative.  Negative for gait problem.   Skin: Negative.  Negative for color change, rash and wound.   Allergic/Immunologic: Negative.  Negative for environmental allergies and food allergies.   Neurological:  Negative for dizziness, syncope, weakness, light-headedness and numbness.   Hematological:  Bruises/bleeds easily.   Psychiatric/Behavioral: Negative.  Negative for sleep disturbance.        Objective  Vitals:    05/20/24 1438   BP: 129/72   BP Location: Left arm   Patient Position: Sitting   Cuff Size: Adult   Pulse: 70   SpO2: 96%   Height: 175.3 cm (69\")      /72 (BP Location: Left arm, Patient Position: Sitting, Cuff Size: Adult)   Pulse 70   Ht 175.3 cm (69\")   SpO2 96%   BMI 30.24 kg/m²     Lab Results (most recent)       None            Physical Exam  Vitals and nursing note reviewed.   Constitutional:       General: He is not in acute distress.     Appearance: Normal appearance. He is well-developed.   HENT:      Head: Normocephalic and atraumatic.   Eyes:      General: No scleral icterus.        Right eye: No discharge.         Left eye: No discharge.      Conjunctiva/sclera: Conjunctivae normal.   Neck:      Vascular: No carotid bruit.   Cardiovascular:      Rate and Rhythm: Normal rate and regular rhythm.      Heart sounds: Normal heart sounds. No murmur heard.     No friction rub. No gallop.   Pulmonary:      Effort: Pulmonary effort is normal. No respiratory distress.      Breath sounds: Normal breath sounds. No wheezing or rales.   Chest:      Chest wall: No tenderness.   Musculoskeletal:      Right lower leg: No edema.      Left lower leg: No edema.   Skin:     General: Skin is warm and dry.      Coloration: Skin is not pale.      Findings: No " erythema or rash.   Neurological:      Mental Status: He is alert and oriented to person, place, and time.      Cranial Nerves: No cranial nerve deficit.   Psychiatric:         Behavior: Behavior normal.         Procedure  Procedures       Assessment & Plan    Problems Addressed this Visit          Cardiac and Vasculature    Essential hypertension    Coronary artery disease involving native coronary artery of native heart without angina pectoris - Primary    Dyslipidemia     Diagnoses         Codes Comments    Coronary artery disease involving native coronary artery of native heart without angina pectoris    -  Primary ICD-10-CM: I25.10  ICD-9-CM: 414.01     Essential hypertension     ICD-10-CM: I10  ICD-9-CM: 401.9     Dyslipidemia     ICD-10-CM: E78.5  ICD-9-CM: 272.4           Recommendations  1.  Patient is a 74-year-old male that presents back for routine assessment.  Patient has felt remarkably improved since his last catheterization.  Patient has no angina no anginal symptoms.  In the setting, we will continue to monitor and proceed with risk factor modification.    2.  Patient with baseline hypertension doing well medical regimen.  I will make no changes.    3.  Patient with dyslipidemia on pravastatin.  Will continue.  He has labs involving his lipids and diabetes, are being managed by primary.  We recommend an LDL goal less than 70.    4.  Otherwise, we will see patient back for follow-up in 6 months or sooner if needed.  Follow-up with primary as scheduled.           Patient did not bring med list or medicine bottles to appointment, med list has been reviewed and updated based on patient's knowledge of their meds.      Advance Care Planning  ACP discussion was declined by the patient. Patient does not have an advance directive, declines further assistance.        Electronically signed by:

## 2024-07-03 ENCOUNTER — TELEPHONE (OUTPATIENT)
Dept: CARDIOLOGY | Facility: CLINIC | Age: 74
End: 2024-07-03
Payer: MEDICARE

## 2024-07-24 ENCOUNTER — HOSPITAL ENCOUNTER (OUTPATIENT)
Dept: GENERAL RADIOLOGY | Facility: HOSPITAL | Age: 74
Discharge: HOME | End: 2024-07-24
Payer: MEDICARE

## 2024-07-24 ENCOUNTER — OFFICE VISIT (OUTPATIENT)
Dept: UROLOGY | Facility: CLINIC | Age: 74
End: 2024-07-24
Payer: MEDICARE

## 2024-07-24 ENCOUNTER — TELEPHONE (OUTPATIENT)
Dept: UROLOGY | Facility: CLINIC | Age: 74
End: 2024-07-24

## 2024-07-24 VITALS
BODY MASS INDEX: 30.84 KG/M2 | HEIGHT: 69 IN | SYSTOLIC BLOOD PRESSURE: 159 MMHG | DIASTOLIC BLOOD PRESSURE: 81 MMHG | HEART RATE: 59 BPM | WEIGHT: 208.2 LBS

## 2024-07-24 DIAGNOSIS — N20.0 LEFT RENAL STONE: ICD-10-CM

## 2024-07-24 DIAGNOSIS — N40.1 BENIGN PROSTATIC HYPERPLASIA WITH URINARY FREQUENCY: ICD-10-CM

## 2024-07-24 DIAGNOSIS — R35.0 BENIGN PROSTATIC HYPERPLASIA WITH URINARY FREQUENCY: ICD-10-CM

## 2024-07-24 DIAGNOSIS — R33.9 INCOMPLETE EMPTYING OF BLADDER: Primary | ICD-10-CM

## 2024-07-24 LAB
BILIRUB BLD-MCNC: NEGATIVE MG/DL
CLARITY, POC: CLEAR
COLOR UR: YELLOW
EXPIRATION DATE: ABNORMAL
GLUCOSE UR STRIP-MCNC: NEGATIVE MG/DL
KETONES UR QL: NEGATIVE
LEUKOCYTE EST, POC: NEGATIVE
Lab: ABNORMAL
NITRITE UR-MCNC: NEGATIVE MG/ML
PH UR: 6 [PH] (ref 5–8)
PROT UR STRIP-MCNC: ABNORMAL MG/DL
RBC # UR STRIP: NEGATIVE /UL
SP GR UR: 1.02 (ref 1–1.03)
UROBILINOGEN UR QL: NORMAL

## 2024-07-24 PROCEDURE — 74018 RADEX ABDOMEN 1 VIEW: CPT

## 2024-07-24 PROCEDURE — 74018 RADEX ABDOMEN 1 VIEW: CPT | Performed by: RADIOLOGY

## 2024-07-24 PROCEDURE — 87086 URINE CULTURE/COLONY COUNT: CPT

## 2024-07-24 NOTE — PROGRESS NOTES
"Chief Complaint:    Chief Complaint   Patient presents with    Urinary Urgency     Follow up     Nephrolithiasis    Urinary Retention       Vital Signs:   /81   Pulse 59   Ht 175.3 cm (69\")   Wt 94.4 kg (208 lb 3.2 oz)   BMI 30.75 kg/m²   Body mass index is 30.75 kg/m².      HPI:  Ye Butler is a 74 y.o. male who presents today for follow up    History of Present Illness  Mr. Butler presents to the clinic for concerns of urinary retention.  He has a past medical history significant for nephrolithiasis and is underwent a previous extracorporal shockwave lithotripsy by Dr. Melendrez in October 2023 with good resolution of his left intrarenal stone.  He also has a medical history significant for BPH with lower urinary tract symptoms and underwent UroLift by Dr. Valencia in [facility] several years ago.  He reports that 24 hours ago patient was scheduled to undergo replacement of his chronic indwelling morphine pump on the left lateral hip.  He reports that before discharge he was asked to urinate and was able to voluntarily urinate a small amount.  He states that when he got home he had an increase in difficulty urinating with frequency, urgency, and pelvic pressure.  He had catheter supplies at home and did intermittently cath himself draining his bladder completely.  He reports he has been unable to urinate since.  He does endorse some constipation and is currently taking laxatives.  He has not had a bowel movement today.  He does continue with Flomax daily.  Patient was unable to urinate in office today and postvoid residual was roughly 120.  Patient did successfully intermittently cath himself and drained roughly 200 mL from his bladder.  Urine analysis showed 1+ protein only with no signs of leukocytes, nitrites, or gross/microscopic hematuria.  Incision site from previous surgery has healed well.  No induration, warmth, or drainage noted.      Past Medical History:  Past Medical History: "   Diagnosis Date    Appendicitis     Arthritis     COPD (chronic obstructive pulmonary disease)     Coronary artery disease     COVID-19 2021    Diabetes mellitus     Disease of thyroid gland     Diverticulitis     Dizzy     Elevated cholesterol     Emphysema lung     Emphysema lung     Emphysema lung     GERD (gastroesophageal reflux disease)     Hiatal hernia     Hyperlipidemia     Hypertension     Kidney stone     MI (myocardial infarction)     PONV (postoperative nausea and vomiting)     Stomach ulcer     Stroke 2018       Current Meds:  Current Outpatient Medications   Medication Sig Dispense Refill    amLODIPine (NORVASC) 5 MG tablet Take 1 tablet by mouth 2 (Two) Times a Day. 180 tablet 5    aspirin 81 MG tablet Take 1 tablet by mouth Daily. 30 tablet 11    clopidogrel (PLAVIX) 75 MG tablet Take 1 tablet by mouth Daily. 90 tablet 3    Docusate Sodium 100 MG capsule Take 1 tablet by mouth As Needed for Constipation.      famotidine (PEPCID) 40 MG tablet Take 1 tablet by mouth Daily.      glimepiride (AMARYL) 4 MG tablet Take 1 tablet by mouth Daily.      isosorbide mononitrate (IMDUR) 30 MG 24 hr tablet Take 1 tablet by mouth Every Morning. 30 tablet 11    levothyroxine (SYNTHROID, LEVOTHROID) 50 MCG tablet Take 1 tablet by mouth Daily.  0    lisinopril (PRINIVIL,ZESTRIL) 20 MG tablet Take 1 tablet by mouth 2 (Two) Times a Day. 60 tablet 5    memantine (NAMENDA) 5 MG tablet Take 1 tablet by mouth Daily.      metFORMIN (GLUCOPHAGE) 500 MG tablet Take 1 tablet by mouth 2 (Two) Times a Day With Meals.  0    metoprolol tartrate (LOPRESSOR) 25 MG tablet 2 (Two) Times a Day.      montelukast (SINGULAIR) 10 MG tablet Take 1 tablet by mouth Every Night.      nitroglycerin (NITROSTAT) 0.4 MG SL tablet 1 under the tongue as needed for angina, may repeat q5mins for up three doses 100 tablet 11    NON FORMULARY Morphine pump      ondansetron ODT (ZOFRAN-ODT) 4 MG disintegrating tablet Place 1 tablet on the tongue Every 6  (Six) Hours As Needed for Nausea or Vomiting. 10 tablet 0    pantoprazole (PROTONIX) 40 MG EC tablet       pravastatin (PRAVACHOL) 40 MG tablet TAKE 1 TABLET BY MOUTH DAILY 90 tablet 0    sertraline (ZOLOFT) 25 MG tablet Take 1 tablet by mouth Daily.      tamsulosin (FLOMAX) 0.4 MG capsule 24 hr capsule 1 capsule Daily.      Testosterone Cypionate (DEPOTESTOTERONE CYPIONATE) 200 MG/ML injection Inject 1 mL into the appropriate muscle as directed by prescriber Every 21 (Twenty-One) Days.      traZODone (DESYREL) 50 MG tablet       Zolpidem Tartrate 5 MG/ACT solution Take  by mouth.       No current facility-administered medications for this visit.        Allergies:   Allergies   Allergen Reactions    Claritin [Loratadine] Other (See Comments)     Possible kidney issues    Codeine Other (See Comments)     Kidney issues         Past Surgical History:  Past Surgical History:   Procedure Laterality Date    ABDOMINAL SURGERY      APPENDECTOMY      CARDIAC CATHETERIZATION      CARDIAC CATHETERIZATION  2023    COLONOSCOPY      CORONARY STENT PLACEMENT      ENDOSCOPY      EXTRACORPOREAL SHOCK WAVE LITHOTRIPSY (ESWL) Right 07/23/2021    Procedure: EXTRACORPOREAL SHOCKWAVE LITHOTRIPSY;  Surgeon: Nabeel Melendrez MD;  Location: [facility];  Service: Urology;  Laterality: Right;    EXTRACORPOREAL SHOCK WAVE LITHOTRIPSY (ESWL) Right 07/07/2023    Procedure: EXTRACORPOREAL SHOCKWAVE LITHOTRIPSY;  Surgeon: Nabeel Melendrez MD;  Location: [facility];  Service: Urology;  Laterality: Right;    EXTRACORPOREAL SHOCK WAVE LITHOTRIPSY (ESWL) Left 10/06/2023    Procedure: EXTRACORPOREAL SHOCKWAVE LITHOTRIPSY;  Surgeon: Nabeel Melendrez MD;  Location: [facility];  Service: Urology;  Laterality: Left;    PAIN PUMP TRIAL      TURP / TRANSURETHRAL INCISION / DRAINAGE PROSTATE         Social History:  Social History     Socioeconomic History    Marital status:    Tobacco Use    Smoking status: Former     Current  packs/day: 0.00     Types: Cigarettes     Start date:      Quit date:      Years since quittin.5    Smokeless tobacco: Never    Tobacco comments:     quit 20+ yrs ago    Vaping Use    Vaping status: Never Used   Substance and Sexual Activity    Alcohol use: No     Comment: former use 20 + yrs ago     Drug use: No    Sexual activity: Defer       Family History:  Family History   Problem Relation Age of Onset    Cancer Mother     Heart disease Father     Stroke Father        Review of Systems:  Review of Systems   Constitutional:  Positive for fatigue. Negative for chills, fever and unexpected weight change.   HENT:  Negative for congestion and sinus pressure.    Respiratory:  Negative for chest tightness and shortness of breath.    Cardiovascular:  Negative for chest pain.   Gastrointestinal:  Positive for abdominal pain and constipation. Negative for diarrhea, nausea and vomiting.   Genitourinary:  Positive for difficulty urinating, frequency and urgency. Negative for dysuria, flank pain, hematuria, penile pain and testicular pain.   Musculoskeletal:  Negative for back pain and neck pain.   Skin:  Negative for rash.   Allergic/Immunologic: Negative for food allergies.   Neurological:  Negative for dizziness and headaches.   Hematological:  Bruises/bleeds easily.   Psychiatric/Behavioral:  Negative for confusion and suicidal ideas. The patient is not nervous/anxious.        Physical Exam:  Physical Exam  Constitutional:       General: He is not in acute distress.     Appearance: Normal appearance.   HENT:      Head: Normocephalic and atraumatic.      Nose: Nose normal.      Mouth/Throat:      Mouth: Mucous membranes are moist.   Eyes:      Conjunctiva/sclera: Conjunctivae normal.   Cardiovascular:      Rate and Rhythm: Normal rate and regular rhythm.      Pulses: Normal pulses.      Heart sounds: Normal heart sounds.   Pulmonary:      Effort: Pulmonary effort is normal.      Breath sounds: Normal breath  sounds.   Abdominal:      General: Bowel sounds are normal.      Palpations: Abdomen is soft.   Musculoskeletal:         General: Normal range of motion.      Cervical back: Normal range of motion.   Skin:     General: Skin is warm.   Neurological:      General: No focal deficit present.      Mental Status: He is alert and oriented to person, place, and time.   Psychiatric:         Mood and Affect: Mood normal.         Behavior: Behavior normal.         Thought Content: Thought content normal.         Judgment: Judgment normal.         IPSS Questionnaire (AUA-7):  IPSS Questionnaire (AUA-7):                  IPSS Questionnaire (AUA-7):  Over the past month…    1)  Incomplete Emptying  How often have you had a sensation of not emptying your bladder?  4 - More than half the time   2)  Frequency  How often have you had to urinate less than every two hours? 2 - Less than half the time   3)  Intermittency  How often have you found you stopped and started again several times when you urinated?  0 - Not at all   4) Urgency  How often have you found it difficult to postpone urination?  2 - Less than half the time   5) Weak Stream  How often have you had a weak urinary stream?  4 - More than half the time   6) Straining  How often have you had to push or strain to begin urination?  5 - Almost always   7) Nocturia  How many times did you typically get up at night to urinate?  3 - 3 times   Total Score:  20   The International Prostate Symptom Score (IPSS) is used to screen, diagnose, track symptoms of benign prostatic hyperplasia (BPH).    0-7 pts (Mild Symptoms)  / 8-19 pts (Moderate) / 20-35 (Severe)    Quality of life due to urinary symptoms:  If you were to spend the rest of your life with your urinary condition the way it is now, how would you feel about that? 4-Mostly Dissatisfied   Urine Leakage (Incontinence) 0-No Leakage       Recent Image (CT and/or KUB):   CT Abdomen and Pelvis: No results found for this or any  previous visit.     CT Stone Protocol: Results for orders placed during the hospital encounter of 08/12/23    CT Abdomen Pelvis Stone Protocol    Narrative  Comparison: April 19, 2023.    Lung bases are clear. Slight nodular contour the liver, question  underlying cirrhosis. The gallbladder, pancreas, spleen and adrenal  glands are unremarkable. Nonobstructive left renal stones with the  dominant stone measuring measures 1.2 cm. Stones distal right ureter  with the dominant stone at the distal right ureter measures 6 mm. More  proximal stones measuring 1-2 mm, findings causing moderate hydroureter  and hydronephrosis with perinephric stranding. Mild right renal scarring  with renal atrophy. No obstructive uropathy seen on the left. Small  fat-containing left inguinal hernia. Diverticulosis is noted without  diverticulitis. No bowel obstruction, free air or significant free fluid  seen. The appendix is not seen. Multilevel degenerative changes  thoracolumbar spine.    Impression  Impression:  1. Distal right ureter stone causing moderate obstructive uropathy.    This report was finalized on 8/12/2023 4:14 PM by Phil Lazar DO.     KUB: Results for orders placed during the hospital encounter of 05/09/24    XR Abdomen KUB    Narrative  EXAM:  XR Abdomen, 1 View    EXAM DATE:  5/9/2024 8:42 AM    CLINICAL HISTORY:  flank pain; N20.0-Calculus of kidney    TECHNIQUE:  Frontal supine view of the abdomen/pelvis.    COMPARISON:  11/6/2023    FINDINGS:  Gastrointestinal tract:  Unremarkable as visualized.  No dilation.  Organs:  Small stones inferior region of the left kidney.  Calcification in the region of the right kidney also noted and may  represent nonobstructing stones.  Bones/joints:  Unremarkable as visualized.  No acute fracture.  Vasculature:  Phleboliths lower pelvis are stable.  Tubes, lines and devices:  Intrathecal stimulator device.  Other findings:  Prostate calcifications are  noted.    Impression  Bilateral kidney stones.      This report was finalized on 5/9/2024 8:49 AM by Dr. Magdy Daley MD.       Labs:  Brief Urine Lab Results  (Last result in the past 365 days)        Color   Clarity   Blood   Leuk Est   Nitrite   Protein   CREAT   Urine HCG        07/24/24 1121 Yellow   Clear   Negative   Negative   Negative   1+                 Office Visit on 07/24/2024   Component Date Value Ref Range Status    Color 07/24/2024 Yellow  Yellow, Straw, Dark Yellow, Maggy Final    Clarity, UA 07/24/2024 Clear  Clear Final    Specific Gravity  07/24/2024 1.025  1.005 - 1.030 Final    pH, Urine 07/24/2024 6.0  5.0 - 8.0 Final    Leukocytes 07/24/2024 Negative  Negative Final    Nitrite, UA 07/24/2024 Negative  Negative Final    Protein, POC 07/24/2024 1+ (A)  Negative mg/dL Final    Glucose, UA 07/24/2024 Negative  Negative mg/dL Final    Ketones, UA 07/24/2024 Negative  Negative Final    Urobilinogen, UA 07/24/2024 Normal  Normal, 0.2 E.U./dL Final    Bilirubin 07/24/2024 Negative  Negative Final    Blood, UA 07/24/2024 Negative  Negative Final    Lot Number 07/24/2024 n   Final    Expiration Date 07/24/2024 n   Final        Procedure: None  Procedures     I have reviewed and agree with the above PMH, PSH, FMH, social history, medications, allergies, and labs.     Assessment/Plan:   Problem List Items Addressed This Visit       Left renal stone    Overview     Added automatically from request for surgery [id number]         Relevant Orders    XR abdomen kub    Benign prostatic hyperplasia with urinary frequency     Other Visit Diagnoses       Incomplete emptying of bladder    -  Primary    Relevant Orders    Urine Culture - Urine, Urine, Random Void    POC Urinalysis Dipstick, Automated (Completed)            Health Maintenance:   Health Maintenance Due   Topic Date Due    COLORECTAL CANCER SCREENING  Never done    Pneumococcal Vaccine 65+ (1 of 1 - PCV) Never done    HEPATITIS C SCREENING  Never  done    ANNUAL WELLNESS VISIT  Never done    LIPID PANEL  05/03/2019    COVID-19 Vaccine (7 - 2023-24 season) 02/04/2024    BMI FOLLOWUP  03/14/2024        Smoking Counseling: Former smoker.  Never used smokeless tobacco.  Counseling given.    Urine Incontinence: Patient reports that he is not currently experiencing any symptoms of urinary incontinence.    Patient was given instructions and counseling regarding his condition or for health maintenance advice. Please see specific information pulled into the AVS if appropriate.    Patient Education:   BPH with incomplete emptying -patient was unable to urinate in office today.  At this time recommend increase Flomax 0.4 mg to once every 12 hours.  We will have the patient continue with clean intermittent catheterization at home.  Advised the patient to try to urinate voluntarily is much as possible.  Advised patient to keep a daily log of intake and output with voluntary urination and CIC.  I did give the patient catheter supplies for 14 French coudé catheters to use at least 3-4 times daily.  Discussed the risk and benefits of clean intermittent catheterization in detail with the patient as well.  He verbalized understanding.  Advised patient if he is unable to urinate we will proceed forward with a in office cystoscopy however I suspect this is most likely postop and should resolve shortly.  Did advise patient to continue with laxatives as needed for bowel movements.  He verbalized understanding.  Left renal stone -patient is having symptoms concerning for possible distal ureteral calculus.  I will obtain a KUB post office visit today and call with results once available.  Did advise patient if KUB is unremarkable and symptoms persist we may proceed forward with a CT scan of the abdomen pelvis.  Did discuss the use of surgical intervention such as ureteroscopy with home laser lithotripsy, extracorporal shockwave lithotripsy, or percutaneous nephrolithotomy.  I will  send the patient's urine off for culture to rule out infection and call with results once available as well.  Otherwise we will see him back in roughly 2 weeks or sooner if needed.  He verbalized understanding.    Visit Diagnoses:    ICD-10-CM ICD-9-CM   1. Incomplete emptying of bladder  R33.9 788.21   2. Benign prostatic hyperplasia with urinary frequency  N40.1 600.01    R35.0 788.41   3. Left renal stone  N20.0 592.0     A total of 30 minutes were spent coordinating this patient’s care in clinic today; 20 minutes of which were face-to-face with the patient, reviewing medical history and counseling on the current treatment and followup plan.  All questions were answered to patient's satisfaction.    Meds Ordered During Visit:  No orders of the defined types were placed in this encounter.      Follow Up Appointment: 2 weeks  No follow-ups on file.      This document has been electronically signed by Dominick Porter PA-C   July 24, 2024 11:40 EDT    Part of this note may be an electronic transcription/translation of spoken language to printed text using the Dragon Dictation System.

## 2024-07-24 NOTE — TELEPHONE ENCOUNTER
Called patient about x-ray results and advised him he had nonobstructing kidney stones with no obvious ureteral stones.  Bile was unremarkable other than mild constipation.  He verbalized understanding.

## 2024-07-26 ENCOUNTER — TELEPHONE (OUTPATIENT)
Dept: UROLOGY | Facility: CLINIC | Age: 74
End: 2024-07-26
Payer: MEDICARE

## 2024-07-26 LAB — BACTERIA SPEC AEROBE CULT: NO GROWTH

## 2024-07-26 NOTE — TELEPHONE ENCOUNTER
----- Message from Dominick Porter sent at 7/26/2024 12:56 PM EDT -----  Please let patient know urine culture showed no growth.  He does not need any antibiotics.  Thank you  ----- Message -----  From: Angela Carpenter MA  Sent: 7/24/2024  11:22 AM EDT  To: Dominick Porter PA-C

## 2024-10-08 RX ORDER — ISOSORBIDE MONONITRATE 30 MG/1
30 TABLET, EXTENDED RELEASE ORAL EVERY MORNING
Qty: 30 TABLET | Refills: 11 | Status: SHIPPED | OUTPATIENT
Start: 2024-10-08

## 2024-12-10 ENCOUNTER — OFFICE VISIT (OUTPATIENT)
Dept: CARDIOLOGY | Facility: CLINIC | Age: 74
End: 2024-12-10
Payer: MEDICARE

## 2024-12-10 VITALS
HEART RATE: 55 BPM | OXYGEN SATURATION: 96 % | SYSTOLIC BLOOD PRESSURE: 146 MMHG | BODY MASS INDEX: 28.58 KG/M2 | WEIGHT: 193 LBS | HEIGHT: 69 IN | DIASTOLIC BLOOD PRESSURE: 78 MMHG

## 2024-12-10 DIAGNOSIS — I10 ESSENTIAL HYPERTENSION: ICD-10-CM

## 2024-12-10 DIAGNOSIS — I25.10 CORONARY ARTERY DISEASE INVOLVING NATIVE CORONARY ARTERY OF NATIVE HEART WITHOUT ANGINA PECTORIS: Primary | ICD-10-CM

## 2024-12-10 DIAGNOSIS — E78.5 DYSLIPIDEMIA: ICD-10-CM

## 2024-12-10 PROCEDURE — 3078F DIAST BP <80 MM HG: CPT | Performed by: PHYSICIAN ASSISTANT

## 2024-12-10 PROCEDURE — 99214 OFFICE O/P EST MOD 30 MIN: CPT | Performed by: PHYSICIAN ASSISTANT

## 2024-12-10 PROCEDURE — 3077F SYST BP >= 140 MM HG: CPT | Performed by: PHYSICIAN ASSISTANT

## 2024-12-10 NOTE — LETTER
December 10, 2024     MD [address]  [facility] [address]    Patient: Ye Butler   YOB: 1950   Date of Visit: 12/10/2024       Dear Rose Stoll MD    Ye Butler was in my office today. Below is a copy of my note.    If you have questions, please do not hesitate to call me. I look forward to following Ye along with you.         Sincerely,        THONY Newberry        CC: No Recipients    Problem list     Subjective  Ye Butler is a 74 y.o. male     Chief Complaint   Patient presents with   • Follow-up     CAD     Problem list  1.  Coronary artery disease  1.1 cardiac catheterization March 2018 because of refractory angina despite negative stress testing demonstrated high-grade RCA disease status post stenting.  Patient underwent stenting ×2 of the circumflex with 70% distal LAD disease and medical management recommended.  1.2 stress test April 2023 with no evidence of ischemia and mildly depressed systolic function of 51%  1.3 cardiac catheterization November 2023 because of medically refractory angina despite negative stress test demonstrated subtotal LAD distal to the diagonal status post stenting x2.  Nonobstructive disease otherwise with patent stent patient had 50% disease involving the circumflex and obtuse marginal with 30 to 50% RCA disease.  Nonobstructive disease otherwise  2.  Accelerated Idioventricular rhythm  2.1 EKG at primary office demonstrated idioventricular rhythm at 90 bpm.  This was prior to catheterization  2.2 event monitor June 2021 with sinus rhythm and occasional PVCs.  No other arrhythmias noted  3.  Hypertension  4..  Dyslipidemia  5.  Type 2 diabetes mellitus  6.  Family history of premature coronary artery disease and a brother with sudden cardiac death    HPI    Patient is a 74-year male that presents to the office for evaluation.  Patient does not describe any issues since being seen last from a cardiac  standpoint.    He does not describe any chest pain or pressure.  No shortness of breath.  No PND, orthopnea, or edema.    He does not describe palpitating nor does he complain of dysrhythmic symptoms.  Overall, he has done well.  His labs are monitored by primary.  He is on appropriate medical regimen.  He is stable at this time.      Current Outpatient Medications on File Prior to Visit   Medication Sig Dispense Refill   • amLODIPine (NORVASC) 5 MG tablet Take 1 tablet by mouth 2 (Two) Times a Day. 180 tablet 5   • aspirin 81 MG tablet Take 1 tablet by mouth Daily. 30 tablet 11   • clopidogrel (PLAVIX) 75 MG tablet Take 1 tablet by mouth Daily. 90 tablet 3   • Docusate Sodium 100 MG capsule Take 1 tablet by mouth As Needed for Constipation.     • famotidine (PEPCID) 40 MG tablet Take 1 tablet by mouth Daily.     • glimepiride (AMARYL) 4 MG tablet Take 1 tablet by mouth Daily.     • isosorbide mononitrate (IMDUR) 30 MG 24 hr tablet TAKE ONE TABLET BY MOUTH EVERY MORNING 30 tablet 11   • levothyroxine (SYNTHROID, LEVOTHROID) 50 MCG tablet Take 1 tablet by mouth Daily.  0   • lisinopril (PRINIVIL,ZESTRIL) 20 MG tablet Take 1 tablet by mouth 2 (Two) Times a Day. 60 tablet 5   • memantine (NAMENDA) 5 MG tablet Take 1 tablet by mouth Daily.     • metFORMIN (GLUCOPHAGE) 500 MG tablet Take 1 tablet by mouth 2 (Two) Times a Day With Meals.  0   • metoprolol tartrate (LOPRESSOR) 25 MG tablet 2 (Two) Times a Day.     • montelukast (SINGULAIR) 10 MG tablet Take 1 tablet by mouth Every Night.     • nitroglycerin (NITROSTAT) 0.4 MG SL tablet 1 under the tongue as needed for angina, may repeat q5mins for up three doses 100 tablet 11   • NON FORMULARY Morphine pump     • ondansetron ODT (ZOFRAN-ODT) 4 MG disintegrating tablet Place 1 tablet on the tongue Every 6 (Six) Hours As Needed for Nausea or Vomiting. 10 tablet 0   • pantoprazole (PROTONIX) 40 MG EC tablet      • pravastatin (PRAVACHOL) 40 MG tablet TAKE 1 TABLET BY MOUTH  DAILY 90 tablet 0   • sertraline (ZOLOFT) 25 MG tablet Take 1 tablet by mouth Daily.     • tamsulosin (FLOMAX) 0.4 MG capsule 24 hr capsule 1 capsule Daily.     • Testosterone Cypionate (DEPOTESTOTERONE CYPIONATE) 200 MG/ML injection Inject 1 mL into the appropriate muscle as directed by prescriber Every 21 (Twenty-One) Days.     • traZODone (DESYREL) 50 MG tablet      • Zolpidem Tartrate 5 MG/ACT solution Take  by mouth.       No current facility-administered medications on file prior to visit.       Claritin [loratadine] and Codeine    Past Medical History:   Diagnosis Date   • Appendicitis    • Arthritis    • COPD (chronic obstructive pulmonary disease)    • Coronary artery disease    • COVID-19    • Diabetes mellitus    • Disease of thyroid gland    • Diverticulitis    • Dizzy    • Elevated cholesterol    • Emphysema lung    • Emphysema lung    • Emphysema lung    • GERD (gastroesophageal reflux disease)    • Hiatal hernia    • Hyperlipidemia    • Hypertension    • Kidney stone    • MI (myocardial infarction)    • PONV (postoperative nausea and vomiting)    • Stomach ulcer    • Stroke 2018       Social History     Socioeconomic History   • Marital status:    Tobacco Use   • Smoking status: Former     Current packs/day: 0.00     Types: Cigarettes     Start date:      Quit date: 2000     Years since quittin.9   • Smokeless tobacco: Never   • Tobacco comments:     quit 20+ yrs ago    Vaping Use   • Vaping status: Never Used   Substance and Sexual Activity   • Alcohol use: No     Comment: former use 20 + yrs ago    • Drug use: No   • Sexual activity: Defer       Family History   Problem Relation Age of Onset   • Cancer Mother    • Heart disease Father    • Stroke Father        Review of Systems   Constitutional: Negative.  Negative for activity change, appetite change, chills, fatigue and fever.   HENT:  Negative for sinus pressure and sinus pain.    Eyes: Negative.  Negative for visual  "disturbance.   Respiratory: Negative.  Negative for apnea, cough, chest tightness, shortness of breath and wheezing.    Cardiovascular: Negative.  Negative for chest pain, palpitations and leg swelling.   Gastrointestinal: Negative.  Negative for blood in stool.   Endocrine: Negative.  Negative for cold intolerance and heat intolerance.   Genitourinary: Negative.  Negative for hematuria.   Musculoskeletal: Negative.  Negative for gait problem.   Skin: Negative.  Negative for color change, rash and wound.   Allergic/Immunologic: Negative.  Negative for environmental allergies and food allergies.   Neurological:  Positive for dizziness. Negative for syncope, weakness, light-headedness, numbness and headaches.   Hematological:  Bruises/bleeds easily.   Psychiatric/Behavioral: Negative.  Negative for sleep disturbance.        Objective  Vitals:    12/10/24 0918   BP: 146/78   BP Location: Right arm   Patient Position: Sitting   Cuff Size: Adult   Pulse: 55   SpO2: 96%   Weight: 87.5 kg (193 lb)   Height: 175.3 cm (69\")      /78 (BP Location: Right arm, Patient Position: Sitting, Cuff Size: Adult)   Pulse 55   Ht 175.3 cm (69\")   Wt 87.5 kg (193 lb)   SpO2 96%   BMI 28.50 kg/m²     Lab Results (most recent)       None            Physical Exam  Vitals and nursing note reviewed.   Constitutional:       General: He is not in acute distress.     Appearance: Normal appearance. He is well-developed.   HENT:      Head: Normocephalic and atraumatic.   Eyes:      General: No scleral icterus.        Right eye: No discharge.         Left eye: No discharge.      Conjunctiva/sclera: Conjunctivae normal.   Neck:      Vascular: No carotid bruit.   Cardiovascular:      Rate and Rhythm: Normal rate and regular rhythm.      Heart sounds: Normal heart sounds. No murmur heard.     No friction rub. No gallop.   Pulmonary:      Effort: Pulmonary effort is normal. No respiratory distress.      Breath sounds: Normal breath sounds. No " wheezing or rales.   Chest:      Chest wall: No tenderness.   Musculoskeletal:      Right lower leg: No edema.      Left lower leg: No edema.   Skin:     General: Skin is warm and dry.      Coloration: Skin is not pale.      Findings: No erythema or rash.   Neurological:      Mental Status: He is alert and oriented to person, place, and time.      Cranial Nerves: No cranial nerve deficit.   Psychiatric:         Behavior: Behavior normal.         Procedure  Procedures       Assessment & Plan    Problems Addressed this Visit          Cardiac and Vasculature    Essential hypertension    Coronary artery disease involving native coronary artery of native heart without angina pectoris - Primary    Dyslipidemia     Diagnoses         Codes Comments    Coronary artery disease involving native coronary artery of native heart without angina pectoris    -  Primary ICD-10-CM: I25.10  ICD-9-CM: 414.01     Essential hypertension     ICD-10-CM: I10  ICD-9-CM: 401.9     Dyslipidemia     ICD-10-CM: E78.5  ICD-9-CM: 272.4             Recommendations  1.  Patient is a 74-year-old male presenting for evaluation.  He is stable.  He has known coronary disease without angina.  His functional status appears to be normal.  He has no symptoms.  We can monitor for now continue risk factor modification.    2.  In that setting, I will continue antiplatelet therapy and statin therapy.  It has been a year since his stenting.  We do have the option of stopping his Plavix.  We will continue for now and readdress upon follow-up.    3.  Patient on pravastatin.  He describes his labs being monitored by primary and we recommend an LDL goal less than 70.  His recent laboratories.  I do not have a lipid parameters.  It appears to be labs that was done in the hospital setting.    4.  Patient's blood pressure is slightly elevated today.  It has been better at previous visits and he may have a degree of whitecoat hypertension.  We can monitor for now.    5.   We will see him back for follow-up in 6 months or sooner if needed.  Follow-up with primary as scheduled.         Patient did not bring med list or medicine bottles to appointment, med list has been reviewed and updated based on patient's knowledge of their meds.        Advance Care Planning  ACP discussion was declined by the patient. Patient does not have an advance directive, declines further assistance.        Electronically signed by:

## 2024-12-10 NOTE — PROGRESS NOTES
Problem list     Subjective   Ye Butler is a 74 y.o. male     Chief Complaint   Patient presents with    Follow-up     CAD     Problem list  1.  Coronary artery disease  1.1 cardiac catheterization March 2018 because of refractory angina despite negative stress testing demonstrated high-grade RCA disease status post stenting.  Patient underwent stenting ×2 of the circumflex with 70% distal LAD disease and medical management recommended.  1.2 stress test April 2023 with no evidence of ischemia and mildly depressed systolic function of 51%  1.3 cardiac catheterization November 2023 because of medically refractory angina despite negative stress test demonstrated subtotal LAD distal to the diagonal status post stenting x2.  Nonobstructive disease otherwise with patent stent patient had 50% disease involving the circumflex and obtuse marginal with 30 to 50% RCA disease.  Nonobstructive disease otherwise  2.  Accelerated Idioventricular rhythm  2.1 EKG at primary office demonstrated idioventricular rhythm at 90 bpm.  This was prior to catheterization  2.2 event monitor June 2021 with sinus rhythm and occasional PVCs.  No other arrhythmias noted  3.  Hypertension  4..  Dyslipidemia  5.  Type 2 diabetes mellitus  6.  Family history of premature coronary artery disease and a brother with sudden cardiac death    HPI    Patient is a 74-year male that presents to the office for evaluation.  Patient does not describe any issues since being seen last from a cardiac standpoint.    He does not describe any chest pain or pressure.  No shortness of breath.  No PND, orthopnea, or edema.    He does not describe palpitating nor does he complain of dysrhythmic symptoms.  Overall, he has done well.  His labs are monitored by primary.  He is on appropriate medical regimen.  He is stable at this time.      Current Outpatient Medications on File Prior to Visit   Medication Sig Dispense Refill    amLODIPine (NORVASC) 5 MG tablet Take 1  tablet by mouth 2 (Two) Times a Day. 180 tablet 5    aspirin 81 MG tablet Take 1 tablet by mouth Daily. 30 tablet 11    clopidogrel (PLAVIX) 75 MG tablet Take 1 tablet by mouth Daily. 90 tablet 3    Docusate Sodium 100 MG capsule Take 1 tablet by mouth As Needed for Constipation.      famotidine (PEPCID) 40 MG tablet Take 1 tablet by mouth Daily.      glimepiride (AMARYL) 4 MG tablet Take 1 tablet by mouth Daily.      isosorbide mononitrate (IMDUR) 30 MG 24 hr tablet TAKE ONE TABLET BY MOUTH EVERY MORNING 30 tablet 11    levothyroxine (SYNTHROID, LEVOTHROID) 50 MCG tablet Take 1 tablet by mouth Daily.  0    lisinopril (PRINIVIL,ZESTRIL) 20 MG tablet Take 1 tablet by mouth 2 (Two) Times a Day. 60 tablet 5    memantine (NAMENDA) 5 MG tablet Take 1 tablet by mouth Daily.      metFORMIN (GLUCOPHAGE) 500 MG tablet Take 1 tablet by mouth 2 (Two) Times a Day With Meals.  0    metoprolol tartrate (LOPRESSOR) 25 MG tablet 2 (Two) Times a Day.      montelukast (SINGULAIR) 10 MG tablet Take 1 tablet by mouth Every Night.      nitroglycerin (NITROSTAT) 0.4 MG SL tablet 1 under the tongue as needed for angina, may repeat q5mins for up three doses 100 tablet 11    NON FORMULARY Morphine pump      ondansetron ODT (ZOFRAN-ODT) 4 MG disintegrating tablet Place 1 tablet on the tongue Every 6 (Six) Hours As Needed for Nausea or Vomiting. 10 tablet 0    pantoprazole (PROTONIX) 40 MG EC tablet       pravastatin (PRAVACHOL) 40 MG tablet TAKE 1 TABLET BY MOUTH DAILY 90 tablet 0    sertraline (ZOLOFT) 25 MG tablet Take 1 tablet by mouth Daily.      tamsulosin (FLOMAX) 0.4 MG capsule 24 hr capsule 1 capsule Daily.      Testosterone Cypionate (DEPOTESTOTERONE CYPIONATE) 200 MG/ML injection Inject 1 mL into the appropriate muscle as directed by prescriber Every 21 (Twenty-One) Days.      traZODone (DESYREL) 50 MG tablet       Zolpidem Tartrate 5 MG/ACT solution Take  by mouth.       No current facility-administered medications on file prior  to visit.       Claritin [loratadine] and Codeine    Past Medical History:   Diagnosis Date    Appendicitis     Arthritis     COPD (chronic obstructive pulmonary disease)     Coronary artery disease     COVID-19     Diabetes mellitus     Disease of thyroid gland     Diverticulitis     Dizzy     Elevated cholesterol     Emphysema lung     Emphysema lung     Emphysema lung     GERD (gastroesophageal reflux disease)     Hiatal hernia     Hyperlipidemia     Hypertension     Kidney stone     MI (myocardial infarction)     PONV (postoperative nausea and vomiting)     Stomach ulcer     Stroke 2018       Social History     Socioeconomic History    Marital status:    Tobacco Use    Smoking status: Former     Current packs/day: 0.00     Types: Cigarettes     Start date:      Quit date:      Years since quittin.9    Smokeless tobacco: Never    Tobacco comments:     quit 20+ yrs ago    Vaping Use    Vaping status: Never Used   Substance and Sexual Activity    Alcohol use: No     Comment: former use 20 + yrs ago     Drug use: No    Sexual activity: Defer       Family History   Problem Relation Age of Onset    Cancer Mother     Heart disease Father     Stroke Father        Review of Systems   Constitutional: Negative.  Negative for activity change, appetite change, chills, fatigue and fever.   HENT:  Negative for sinus pressure and sinus pain.    Eyes: Negative.  Negative for visual disturbance.   Respiratory: Negative.  Negative for apnea, cough, chest tightness, shortness of breath and wheezing.    Cardiovascular: Negative.  Negative for chest pain, palpitations and leg swelling.   Gastrointestinal: Negative.  Negative for blood in stool.   Endocrine: Negative.  Negative for cold intolerance and heat intolerance.   Genitourinary: Negative.  Negative for hematuria.   Musculoskeletal: Negative.  Negative for gait problem.   Skin: Negative.  Negative for color change, rash and wound.   Allergic/Immunologic:  "Negative.  Negative for environmental allergies and food allergies.   Neurological:  Positive for dizziness. Negative for syncope, weakness, light-headedness, numbness and headaches.   Hematological:  Bruises/bleeds easily.   Psychiatric/Behavioral: Negative.  Negative for sleep disturbance.        Objective   Vitals:    12/10/24 0918   BP: 146/78   BP Location: Right arm   Patient Position: Sitting   Cuff Size: Adult   Pulse: 55   SpO2: 96%   Weight: 87.5 kg (193 lb)   Height: 175.3 cm (69\")      /78 (BP Location: Right arm, Patient Position: Sitting, Cuff Size: Adult)   Pulse 55   Ht 175.3 cm (69\")   Wt 87.5 kg (193 lb)   SpO2 96%   BMI 28.50 kg/m²     Lab Results (most recent)       None            Physical Exam  Vitals and nursing note reviewed.   Constitutional:       General: He is not in acute distress.     Appearance: Normal appearance. He is well-developed.   HENT:      Head: Normocephalic and atraumatic.   Eyes:      General: No scleral icterus.        Right eye: No discharge.         Left eye: No discharge.      Conjunctiva/sclera: Conjunctivae normal.   Neck:      Vascular: No carotid bruit.   Cardiovascular:      Rate and Rhythm: Normal rate and regular rhythm.      Heart sounds: Normal heart sounds. No murmur heard.     No friction rub. No gallop.   Pulmonary:      Effort: Pulmonary effort is normal. No respiratory distress.      Breath sounds: Normal breath sounds. No wheezing or rales.   Chest:      Chest wall: No tenderness.   Musculoskeletal:      Right lower leg: No edema.      Left lower leg: No edema.   Skin:     General: Skin is warm and dry.      Coloration: Skin is not pale.      Findings: No erythema or rash.   Neurological:      Mental Status: He is alert and oriented to person, place, and time.      Cranial Nerves: No cranial nerve deficit.   Psychiatric:         Behavior: Behavior normal.         Procedure   Procedures       Assessment & Plan     Problems Addressed this Visit  "         Cardiac and Vasculature    Essential hypertension    Coronary artery disease involving native coronary artery of native heart without angina pectoris - Primary    Dyslipidemia     Diagnoses         Codes Comments    Coronary artery disease involving native coronary artery of native heart without angina pectoris    -  Primary ICD-10-CM: I25.10  ICD-9-CM: 414.01     Essential hypertension     ICD-10-CM: I10  ICD-9-CM: 401.9     Dyslipidemia     ICD-10-CM: E78.5  ICD-9-CM: 272.4             Recommendations  1.  Patient is a 74-year-old male presenting for evaluation.  He is stable.  He has known coronary disease without angina.  His functional status appears to be normal.  He has no symptoms.  We can monitor for now continue risk factor modification.    2.  In that setting, I will continue antiplatelet therapy and statin therapy.  It has been a year since his stenting.  We do have the option of stopping his Plavix.  We will continue for now and readdress upon follow-up.    3.  Patient on pravastatin.  He describes his labs being monitored by primary and we recommend an LDL goal less than 70.  His recent laboratories.  I do not have a lipid parameters.  It appears to be labs that was done in the hospital setting.    4.  Patient's blood pressure is slightly elevated today.  It has been better at previous visits and he may have a degree of whitecoat hypertension.  We can monitor for now.    5.  We will see him back for follow-up in 6 months or sooner if needed.  Follow-up with primary as scheduled.         Patient did not bring med list or medicine bottles to appointment, med list has been reviewed and updated based on patient's knowledge of their meds.        Advance Care Planning   ACP discussion was declined by the patient. Patient does not have an advance directive, declines further assistance.        Electronically signed by:

## 2025-07-10 ENCOUNTER — OFFICE VISIT (OUTPATIENT)
Dept: CARDIOLOGY | Facility: CLINIC | Age: 75
End: 2025-07-10
Payer: MEDICARE

## 2025-07-10 VITALS
OXYGEN SATURATION: 95 % | WEIGHT: 199 LBS | BODY MASS INDEX: 29.47 KG/M2 | DIASTOLIC BLOOD PRESSURE: 74 MMHG | HEART RATE: 56 BPM | SYSTOLIC BLOOD PRESSURE: 119 MMHG | HEIGHT: 69 IN

## 2025-07-10 DIAGNOSIS — I10 ESSENTIAL HYPERTENSION: ICD-10-CM

## 2025-07-10 DIAGNOSIS — E78.5 DYSLIPIDEMIA: ICD-10-CM

## 2025-07-10 DIAGNOSIS — I25.10 CORONARY ARTERY DISEASE INVOLVING NATIVE CORONARY ARTERY OF NATIVE HEART WITHOUT ANGINA PECTORIS: Primary | ICD-10-CM

## 2025-07-10 NOTE — PROGRESS NOTES
Problem list     Subjective   Ye Butler is a 75 y.o. male     Chief Complaint   Patient presents with    7 month follow up     CAD      Problem list  1.  Coronary artery disease  1.1 cardiac catheterization March 2018 because of refractory angina despite negative stress testing demonstrated high-grade RCA disease status post stenting.  Patient underwent stenting ×2 of the circumflex with 70% distal LAD disease and medical management recommended.  1.2 stress test April 2023 with no evidence of ischemia and mildly depressed systolic function of 51%  1.3 cardiac catheterization November 2023 because of medically refractory angina despite negative stress test demonstrated subtotal LAD distal to the diagonal status post stenting x2.  Nonobstructive disease otherwise with patent stent patient had 50% disease involving the circumflex and obtuse marginal with 30 to 50% RCA disease.  Nonobstructive disease otherwise  2.  Accelerated Idioventricular rhythm  2.1 EKG at primary office demonstrated idioventricular rhythm at 90 bpm.  This was prior to catheterization  2.2 event monitor June 2021 with sinus rhythm and occasional PVCs.  No other arrhythmias noted  3.  Hypertension  4..  Dyslipidemia  5.  Type 2 diabetes mellitus  6.  Family history of premature coronary artery disease and a brother with sudden cardiac death       HPI    Patient is a 75-year-old male presenting to the for routine cardiac assessment.  Clinically, patient appears to be doing well.  No angina no anginal quality symptoms.  Patient has no pain or discomfort.  No shortness of breath.  No PND or orthopnea.    No complaints of palpitations.  No complaints of dysrhythmic symptoms.    Overall, patient appears to be doing well.  He does express desire to come off Plavix.  He is stable otherwise.      Current Outpatient Medications on File Prior to Visit   Medication Sig Dispense Refill    amLODIPine (NORVASC) 5 MG tablet Take 1 tablet by mouth 2 (Two)  Times a Day. 180 tablet 5    aspirin 81 MG tablet Take 1 tablet by mouth Daily. 30 tablet 11    Docusate Sodium 100 MG capsule Take 1 tablet by mouth As Needed for Constipation.      famotidine (PEPCID) 40 MG tablet Take 1 tablet by mouth Daily.      glimepiride (AMARYL) 4 MG tablet Take 1 tablet by mouth Daily.      isosorbide mononitrate (IMDUR) 30 MG 24 hr tablet TAKE ONE TABLET BY MOUTH EVERY MORNING 30 tablet 11    levothyroxine (SYNTHROID, LEVOTHROID) 50 MCG tablet Take 1 tablet by mouth Daily.  0    lisinopril (PRINIVIL,ZESTRIL) 20 MG tablet Take 1 tablet by mouth 2 (Two) Times a Day. 60 tablet 5    memantine (NAMENDA) 5 MG tablet Take 1 tablet by mouth Daily.      metFORMIN (GLUCOPHAGE) 500 MG tablet Take 1 tablet by mouth 2 (Two) Times a Day With Meals.  0    metoprolol tartrate (LOPRESSOR) 25 MG tablet 2 (Two) Times a Day.      montelukast (SINGULAIR) 10 MG tablet Take 1 tablet by mouth Every Night.      nitroglycerin (NITROSTAT) 0.4 MG SL tablet 1 under the tongue as needed for angina, may repeat q5mins for up three doses 100 tablet 11    NON FORMULARY Morphine pump      ondansetron ODT (ZOFRAN-ODT) 4 MG disintegrating tablet Place 1 tablet on the tongue Every 6 (Six) Hours As Needed for Nausea or Vomiting. 10 tablet 0    pantoprazole (PROTONIX) 40 MG EC tablet       pravastatin (PRAVACHOL) 40 MG tablet TAKE 1 TABLET BY MOUTH DAILY 90 tablet 0    sertraline (ZOLOFT) 25 MG tablet Take 1 tablet by mouth Daily.      tamsulosin (FLOMAX) 0.4 MG capsule 24 hr capsule 1 capsule Daily.      Testosterone Cypionate (DEPOTESTOTERONE CYPIONATE) 200 MG/ML injection Inject 1 mL into the appropriate muscle as directed by prescriber Every 21 (Twenty-One) Days.      traZODone (DESYREL) 50 MG tablet       Zolpidem Tartrate 5 MG/ACT solution Take  by mouth.      [DISCONTINUED] clopidogrel (PLAVIX) 75 MG tablet Take 1 tablet by mouth Daily. 90 tablet 3     No current facility-administered medications on file prior to visit.        Claritin [loratadine] and Codeine    Past Medical History:   Diagnosis Date    Appendicitis     Arthritis     COPD (chronic obstructive pulmonary disease)     Coronary artery disease     COVID-19     Diabetes mellitus     Disease of thyroid gland     Diverticulitis     Dizzy     Elevated cholesterol     Emphysema lung     Emphysema lung     Emphysema lung     GERD (gastroesophageal reflux disease)     Hiatal hernia     Hyperlipidemia     Hypertension     Kidney stone     MI (myocardial infarction)     PONV (postoperative nausea and vomiting)     Stomach ulcer     Stroke 2018       Social History     Socioeconomic History    Marital status:    Tobacco Use    Smoking status: Former     Current packs/day: 0.00     Types: Cigarettes     Start date:      Quit date:      Years since quittin.5    Smokeless tobacco: Never    Tobacco comments:     quit 20+ yrs ago    Vaping Use    Vaping status: Never Used   Substance and Sexual Activity    Alcohol use: No     Comment: former use 20 + yrs ago     Drug use: No    Sexual activity: Defer       Family History   Problem Relation Age of Onset    Cancer Mother     Heart disease Father     Stroke Father        Review of Systems   Constitutional:  Negative for activity change, appetite change, chills, fatigue and fever.   HENT: Negative.  Negative for congestion, sinus pressure and sinus pain.    Eyes: Negative.  Negative for visual disturbance.   Respiratory:  Negative for apnea, cough, chest tightness, shortness of breath and wheezing.    Cardiovascular:  Positive for palpitations. Negative for chest pain and leg swelling.   Gastrointestinal: Negative.  Negative for blood in stool.   Endocrine: Negative.  Negative for cold intolerance and heat intolerance.   Genitourinary: Negative.  Negative for hematuria.   Musculoskeletal: Negative.  Negative for gait problem.   Skin: Negative.  Negative for color change, rash and wound.   Allergic/Immunologic:  "Negative.  Negative for environmental allergies and food allergies.   Neurological:  Positive for dizziness. Negative for syncope, weakness, light-headedness, numbness and headaches.   Hematological:  Bruises/bleeds easily.   Psychiatric/Behavioral: Negative.  Negative for sleep disturbance.        Objective   Vitals:    07/10/25 0959   BP: 119/74   BP Location: Right arm   Patient Position: Sitting   Cuff Size: Adult   Pulse: 56   SpO2: 95%   Weight: 90.3 kg (199 lb)   Height: 175.3 cm (69\")      /74 (BP Location: Right arm, Patient Position: Sitting, Cuff Size: Adult)   Pulse 56   Ht 175.3 cm (69\")   Wt 90.3 kg (199 lb)   SpO2 95%   BMI 29.39 kg/m²     Lab Results (most recent)       None            Physical Exam  Vitals and nursing note reviewed.   Constitutional:       General: He is not in acute distress.     Appearance: Normal appearance. He is well-developed.   HENT:      Head: Normocephalic and atraumatic.   Eyes:      General: No scleral icterus.        Right eye: No discharge.         Left eye: No discharge.      Conjunctiva/sclera: Conjunctivae normal.   Neck:      Vascular: No carotid bruit.   Cardiovascular:      Rate and Rhythm: Normal rate and regular rhythm.      Heart sounds: Normal heart sounds. No murmur heard.     No friction rub. No gallop.   Pulmonary:      Effort: Pulmonary effort is normal. No respiratory distress.      Breath sounds: Normal breath sounds. No wheezing or rales.   Chest:      Chest wall: No tenderness.   Musculoskeletal:      Right lower leg: No edema.      Left lower leg: No edema.   Skin:     General: Skin is warm and dry.      Coloration: Skin is not pale.      Findings: No erythema or rash.   Neurological:      Mental Status: He is alert and oriented to person, place, and time.      Cranial Nerves: No cranial nerve deficit.   Psychiatric:         Behavior: Behavior normal.         Procedure   Procedures       Assessment & Plan     Problems Addressed this Visit  "         Cardiac and Vasculature    Essential hypertension    Coronary artery disease involving native coronary artery of native heart without angina pectoris - Primary    Dyslipidemia     Diagnoses         Codes Comments      Coronary artery disease involving native coronary artery of native heart without angina pectoris    -  Primary ICD-10-CM: I25.10  ICD-9-CM: 414.01       Essential hypertension     ICD-10-CM: I10  ICD-9-CM: 401.9       Dyslipidemia     ICD-10-CM: E78.5  ICD-9-CM: 272.4           Recommendations  1.  Patient is a 75-year-old male presenting to the office for evaluation with known coronary disease.  He feels well.  He has no angina or anginal quality symptoms.  For now, we can continue medical therapy.  It has been 2 years since stenting, we will stop Plavix but recommend continuing aspirin therapy.    2.  Patient with dyslipidemia with labs monitored by primary.  We recommended LDL goal less than 70.    3.  Blood pressure is controlled on current medical regimen.  We can continue to monitor.    4.  Overall, patient appears stable.  We can see him back for follow-up in 6 months to year or sooner if needed.  Follow-up with primary as scheduled.             Ye Butler  reports that he quit smoking about 25 years ago. His smoking use included cigarettes. He started smoking about 45 years ago. He has never used smokeless tobacco.     Patient did not bring med list or medicine bottles to appointment, med list has been reviewed and updated based on patient's knowledge of their meds.      Advance Care Planning   ACP discussion was declined by the patient. Patient does not have an advance directive, declines further assistance.      Electronically signed by:

## 2025-07-10 NOTE — LETTER
July 10, 2025     MD [address]  [facility] [address]    Patient: Ye Butler   YOB: 1950   Date of Visit: 7/10/2025       Dear Rose Stoll MD    Ye Butler was in my office today. Below is a copy of my note.    If you have questions, please do not hesitate to call me. I look forward to following Ye along with you.         Sincerely,        THONY Newberry        CC: No Recipients    Problem list     Subjective  Ye Butler is a 75 y.o. male     Chief Complaint   Patient presents with   • 7 month follow up     CAD      Problem list  1.  Coronary artery disease  1.1 cardiac catheterization March 2018 because of refractory angina despite negative stress testing demonstrated high-grade RCA disease status post stenting.  Patient underwent stenting ×2 of the circumflex with 70% distal LAD disease and medical management recommended.  1.2 stress test April 2023 with no evidence of ischemia and mildly depressed systolic function of 51%  1.3 cardiac catheterization November 2023 because of medically refractory angina despite negative stress test demonstrated subtotal LAD distal to the diagonal status post stenting x2.  Nonobstructive disease otherwise with patent stent patient had 50% disease involving the circumflex and obtuse marginal with 30 to 50% RCA disease.  Nonobstructive disease otherwise  2.  Accelerated Idioventricular rhythm  2.1 EKG at primary office demonstrated idioventricular rhythm at 90 bpm.  This was prior to catheterization  2.2 event monitor June 2021 with sinus rhythm and occasional PVCs.  No other arrhythmias noted  3.  Hypertension  4..  Dyslipidemia  5.  Type 2 diabetes mellitus  6.  Family history of premature coronary artery disease and a brother with sudden cardiac death       HPI    Patient is a 75-year-old male presenting to the for routine cardiac assessment.  Clinically, patient appears to be doing well.  No angina  no anginal quality symptoms.  Patient has no pain or discomfort.  No shortness of breath.  No PND or orthopnea.    No complaints of palpitations.  No complaints of dysrhythmic symptoms.    Overall, patient appears to be doing well.  He does express desire to come off Plavix.  He is stable otherwise.      Current Outpatient Medications on File Prior to Visit   Medication Sig Dispense Refill   • amLODIPine (NORVASC) 5 MG tablet Take 1 tablet by mouth 2 (Two) Times a Day. 180 tablet 5   • aspirin 81 MG tablet Take 1 tablet by mouth Daily. 30 tablet 11   • Docusate Sodium 100 MG capsule Take 1 tablet by mouth As Needed for Constipation.     • famotidine (PEPCID) 40 MG tablet Take 1 tablet by mouth Daily.     • glimepiride (AMARYL) 4 MG tablet Take 1 tablet by mouth Daily.     • isosorbide mononitrate (IMDUR) 30 MG 24 hr tablet TAKE ONE TABLET BY MOUTH EVERY MORNING 30 tablet 11   • levothyroxine (SYNTHROID, LEVOTHROID) 50 MCG tablet Take 1 tablet by mouth Daily.  0   • lisinopril (PRINIVIL,ZESTRIL) 20 MG tablet Take 1 tablet by mouth 2 (Two) Times a Day. 60 tablet 5   • memantine (NAMENDA) 5 MG tablet Take 1 tablet by mouth Daily.     • metFORMIN (GLUCOPHAGE) 500 MG tablet Take 1 tablet by mouth 2 (Two) Times a Day With Meals.  0   • metoprolol tartrate (LOPRESSOR) 25 MG tablet 2 (Two) Times a Day.     • montelukast (SINGULAIR) 10 MG tablet Take 1 tablet by mouth Every Night.     • nitroglycerin (NITROSTAT) 0.4 MG SL tablet 1 under the tongue as needed for angina, may repeat q5mins for up three doses 100 tablet 11   • NON FORMULARY Morphine pump     • ondansetron ODT (ZOFRAN-ODT) 4 MG disintegrating tablet Place 1 tablet on the tongue Every 6 (Six) Hours As Needed for Nausea or Vomiting. 10 tablet 0   • pantoprazole (PROTONIX) 40 MG EC tablet      • pravastatin (PRAVACHOL) 40 MG tablet TAKE 1 TABLET BY MOUTH DAILY 90 tablet 0   • sertraline (ZOLOFT) 25 MG tablet Take 1 tablet by mouth Daily.     • tamsulosin (FLOMAX)  0.4 MG capsule 24 hr capsule 1 capsule Daily.     • Testosterone Cypionate (DEPOTESTOTERONE CYPIONATE) 200 MG/ML injection Inject 1 mL into the appropriate muscle as directed by prescriber Every 21 (Twenty-One) Days.     • traZODone (DESYREL) 50 MG tablet      • Zolpidem Tartrate 5 MG/ACT solution Take  by mouth.     • [DISCONTINUED] clopidogrel (PLAVIX) 75 MG tablet Take 1 tablet by mouth Daily. 90 tablet 3     No current facility-administered medications on file prior to visit.       Claritin [loratadine] and Codeine    Past Medical History:   Diagnosis Date   • Appendicitis    • Arthritis    • COPD (chronic obstructive pulmonary disease)    • Coronary artery disease    • COVID-19    • Diabetes mellitus    • Disease of thyroid gland    • Diverticulitis    • Dizzy    • Elevated cholesterol    • Emphysema lung    • Emphysema lung    • Emphysema lung    • GERD (gastroesophageal reflux disease)    • Hiatal hernia    • Hyperlipidemia    • Hypertension    • Kidney stone    • MI (myocardial infarction)    • PONV (postoperative nausea and vomiting)    • Stomach ulcer    • Stroke 2018       Social History     Socioeconomic History   • Marital status:    Tobacco Use   • Smoking status: Former     Current packs/day: 0.00     Types: Cigarettes     Start date:      Quit date:      Years since quittin.5   • Smokeless tobacco: Never   • Tobacco comments:     quit 20+ yrs ago    Vaping Use   • Vaping status: Never Used   Substance and Sexual Activity   • Alcohol use: No     Comment: former use 20 + yrs ago    • Drug use: No   • Sexual activity: Defer       Family History   Problem Relation Age of Onset   • Cancer Mother    • Heart disease Father    • Stroke Father        Review of Systems   Constitutional:  Negative for activity change, appetite change, chills, fatigue and fever.   HENT: Negative.  Negative for congestion, sinus pressure and sinus pain.    Eyes: Negative.  Negative for visual disturbance.  "  Respiratory:  Negative for apnea, cough, chest tightness, shortness of breath and wheezing.    Cardiovascular:  Positive for palpitations. Negative for chest pain and leg swelling.   Gastrointestinal: Negative.  Negative for blood in stool.   Endocrine: Negative.  Negative for cold intolerance and heat intolerance.   Genitourinary: Negative.  Negative for hematuria.   Musculoskeletal: Negative.  Negative for gait problem.   Skin: Negative.  Negative for color change, rash and wound.   Allergic/Immunologic: Negative.  Negative for environmental allergies and food allergies.   Neurological:  Positive for dizziness. Negative for syncope, weakness, light-headedness, numbness and headaches.   Hematological:  Bruises/bleeds easily.   Psychiatric/Behavioral: Negative.  Negative for sleep disturbance.        Objective  Vitals:    07/10/25 0959   BP: 119/74   BP Location: Right arm   Patient Position: Sitting   Cuff Size: Adult   Pulse: 56   SpO2: 95%   Weight: 90.3 kg (199 lb)   Height: 175.3 cm (69\")      /74 (BP Location: Right arm, Patient Position: Sitting, Cuff Size: Adult)   Pulse 56   Ht 175.3 cm (69\")   Wt 90.3 kg (199 lb)   SpO2 95%   BMI 29.39 kg/m²     Lab Results (most recent)       None            Physical Exam  Vitals and nursing note reviewed.   Constitutional:       General: He is not in acute distress.     Appearance: Normal appearance. He is well-developed.   HENT:      Head: Normocephalic and atraumatic.   Eyes:      General: No scleral icterus.        Right eye: No discharge.         Left eye: No discharge.      Conjunctiva/sclera: Conjunctivae normal.   Neck:      Vascular: No carotid bruit.   Cardiovascular:      Rate and Rhythm: Normal rate and regular rhythm.      Heart sounds: Normal heart sounds. No murmur heard.     No friction rub. No gallop.   Pulmonary:      Effort: Pulmonary effort is normal. No respiratory distress.      Breath sounds: Normal breath sounds. No wheezing or rales. "   Chest:      Chest wall: No tenderness.   Musculoskeletal:      Right lower leg: No edema.      Left lower leg: No edema.   Skin:     General: Skin is warm and dry.      Coloration: Skin is not pale.      Findings: No erythema or rash.   Neurological:      Mental Status: He is alert and oriented to person, place, and time.      Cranial Nerves: No cranial nerve deficit.   Psychiatric:         Behavior: Behavior normal.         Procedure  Procedures       Assessment & Plan    Problems Addressed this Visit          Cardiac and Vasculature    Essential hypertension    Coronary artery disease involving native coronary artery of native heart without angina pectoris - Primary    Dyslipidemia     Diagnoses         Codes Comments      Coronary artery disease involving native coronary artery of native heart without angina pectoris    -  Primary ICD-10-CM: I25.10  ICD-9-CM: 414.01       Essential hypertension     ICD-10-CM: I10  ICD-9-CM: 401.9       Dyslipidemia     ICD-10-CM: E78.5  ICD-9-CM: 272.4           Recommendations  1.  Patient is a 75-year-old male presenting to the office for evaluation with known coronary disease.  He feels well.  He has no angina or anginal quality symptoms.  For now, we can continue medical therapy.  It has been 2 years since stenting, we will stop Plavix but recommend continuing aspirin therapy.    2.  Patient with dyslipidemia with labs monitored by primary.  We recommended LDL goal less than 70.    3.  Blood pressure is controlled on current medical regimen.  We can continue to monitor.    4.  Overall, patient appears stable.  We can see him back for follow-up in 6 months to year or sooner if needed.  Follow-up with primary as scheduled.             Ye Butler  reports that he quit smoking about 25 years ago. His smoking use included cigarettes. He started smoking about 45 years ago. He has never used smokeless tobacco.     Patient did not bring med list or medicine bottles to  appointment, med list has been reviewed and updated based on patient's knowledge of their meds.      Advance Care Planning  ACP discussion was declined by the patient. Patient does not have an advance directive, declines further assistance.      Electronically signed by:

## 2025-07-31 RX ORDER — ISOSORBIDE MONONITRATE 30 MG/1
30 TABLET, EXTENDED RELEASE ORAL EVERY MORNING
Qty: 30 TABLET | Refills: 11 | Status: SHIPPED | OUTPATIENT
Start: 2025-07-31

## (undated) DEVICE — COR CYSTO: Brand: MEDLINE INDUSTRIES, INC.